# Patient Record
Sex: FEMALE | Race: WHITE | NOT HISPANIC OR LATINO | Employment: FULL TIME | URBAN - NONMETROPOLITAN AREA
[De-identification: names, ages, dates, MRNs, and addresses within clinical notes are randomized per-mention and may not be internally consistent; named-entity substitution may affect disease eponyms.]

---

## 2017-01-03 ENCOUNTER — AMBULATORY - GICH (OUTPATIENT)
Dept: SCHEDULING | Facility: OTHER | Age: 22
End: 2017-01-03

## 2017-01-04 ENCOUNTER — HISTORY (OUTPATIENT)
Dept: OBGYN | Facility: OTHER | Age: 22
End: 2017-01-04

## 2017-01-04 ENCOUNTER — HOSPITAL ENCOUNTER (OUTPATIENT)
Dept: OBGYN | Facility: OTHER | Age: 22
Discharge: HOME | End: 2017-01-04
Attending: FAMILY MEDICINE | Admitting: FAMILY MEDICINE

## 2017-01-04 LAB
BACTERIA URINE: NORMAL BACTERIA/HPF
BILIRUB UR QL: NEGATIVE
CLARITY, URINE: CLEAR CLARITY
COLOR UR: YELLOW COLOR
EPITHELIAL CELLS: NORMAL EPI/HPF
GLUCOSE URINE: NEGATIVE MG/DL
KETONES UR QL: NEGATIVE MG/DL
LEUKOCYTE ESTERASE URINE: ABNORMAL
NITRITE UR QL STRIP: NEGATIVE
OCCULT BLOOD,URINE - HISTORICAL: ABNORMAL
PH UR: 6.5 [PH]
PROTEIN QUALITATIVE,URINE - HISTORICAL: NEGATIVE MG/DL
RBC - HISTORICAL: NORMAL /HPF
SP GR UR STRIP: <=1.005
UROBILINOGEN,QUALITATIVE - HISTORICAL: NORMAL EU/DL
WBC - HISTORICAL: NORMAL /HPF

## 2017-01-05 ENCOUNTER — AMBULATORY - GICH (OUTPATIENT)
Dept: RADIOLOGY | Facility: OTHER | Age: 22
End: 2017-01-05

## 2017-01-05 ENCOUNTER — COMMUNICATION - GICH (OUTPATIENT)
Dept: GERIATRICS | Facility: OTHER | Age: 22
End: 2017-01-05

## 2017-01-05 DIAGNOSIS — N13.30 HYDRONEPHROSIS: ICD-10-CM

## 2017-01-06 ENCOUNTER — HOSPITAL ENCOUNTER (OUTPATIENT)
Dept: RADIOLOGY | Facility: OTHER | Age: 22
End: 2017-01-06
Attending: FAMILY MEDICINE

## 2017-01-06 DIAGNOSIS — N13.30 HYDRONEPHROSIS: ICD-10-CM

## 2017-01-18 ENCOUNTER — PRENATAL OFFICE VISIT - GICH (OUTPATIENT)
Dept: FAMILY MEDICINE | Facility: OTHER | Age: 22
End: 2017-01-18

## 2017-01-18 ENCOUNTER — HISTORY (OUTPATIENT)
Dept: FAMILY MEDICINE | Facility: OTHER | Age: 22
End: 2017-01-18

## 2017-01-18 DIAGNOSIS — Z23 ENCOUNTER FOR IMMUNIZATION: ICD-10-CM

## 2017-01-18 DIAGNOSIS — Z34.03 ENCOUNTER FOR SUPERVISION OF NORMAL FIRST PREGNANCY IN THIRD TRIMESTER: ICD-10-CM

## 2017-01-18 DIAGNOSIS — O09.899 SUPERVISION OF OTHER HIGH RISK PREGNANCIES, UNSPECIFIED TRIMESTER: ICD-10-CM

## 2017-01-18 LAB — RH IMMUNE GLOBULIN PREP - HISTORICAL: NORMAL

## 2017-02-02 ENCOUNTER — PRENATAL OFFICE VISIT - GICH (OUTPATIENT)
Dept: FAMILY MEDICINE | Facility: OTHER | Age: 22
End: 2017-02-02

## 2017-02-02 DIAGNOSIS — Z34.03 ENCOUNTER FOR SUPERVISION OF NORMAL FIRST PREGNANCY IN THIRD TRIMESTER: ICD-10-CM

## 2017-02-02 DIAGNOSIS — F41.1 GENERALIZED ANXIETY DISORDER: ICD-10-CM

## 2017-02-02 DIAGNOSIS — O09.892 SUPERVISION OF OTHER HIGH RISK PREGNANCIES, SECOND TRIMESTER: ICD-10-CM

## 2017-02-16 ENCOUNTER — HISTORY (OUTPATIENT)
Dept: FAMILY MEDICINE | Facility: OTHER | Age: 22
End: 2017-02-16

## 2017-02-16 ENCOUNTER — PRENATAL OFFICE VISIT - GICH (OUTPATIENT)
Dept: FAMILY MEDICINE | Facility: OTHER | Age: 22
End: 2017-02-16

## 2017-02-16 DIAGNOSIS — Z34.03 ENCOUNTER FOR SUPERVISION OF NORMAL FIRST PREGNANCY IN THIRD TRIMESTER: ICD-10-CM

## 2017-03-01 ENCOUNTER — HISTORY (OUTPATIENT)
Dept: FAMILY MEDICINE | Facility: OTHER | Age: 22
End: 2017-03-01

## 2017-03-01 ENCOUNTER — PRENATAL OFFICE VISIT - GICH (OUTPATIENT)
Dept: FAMILY MEDICINE | Facility: OTHER | Age: 22
End: 2017-03-01

## 2017-03-01 DIAGNOSIS — O09.893 SUPERVISION OF OTHER HIGH RISK PREGNANCIES, THIRD TRIMESTER: ICD-10-CM

## 2017-03-01 DIAGNOSIS — Z34.03 ENCOUNTER FOR SUPERVISION OF NORMAL FIRST PREGNANCY IN THIRD TRIMESTER: ICD-10-CM

## 2017-03-01 DIAGNOSIS — O12.03 GESTATIONAL EDEMA IN THIRD TRIMESTER: ICD-10-CM

## 2017-03-14 ENCOUNTER — HISTORY (OUTPATIENT)
Dept: FAMILY MEDICINE | Facility: OTHER | Age: 22
End: 2017-03-14

## 2017-03-14 ENCOUNTER — PRENATAL OFFICE VISIT - GICH (OUTPATIENT)
Dept: FAMILY MEDICINE | Facility: OTHER | Age: 22
End: 2017-03-14

## 2017-03-14 DIAGNOSIS — Z34.03 ENCOUNTER FOR SUPERVISION OF NORMAL FIRST PREGNANCY IN THIRD TRIMESTER: ICD-10-CM

## 2017-03-16 LAB — CULTURE - HISTORICAL: NORMAL

## 2017-03-22 ENCOUNTER — HISTORY (OUTPATIENT)
Dept: FAMILY MEDICINE | Facility: OTHER | Age: 22
End: 2017-03-22

## 2017-03-22 ENCOUNTER — PRENATAL OFFICE VISIT - GICH (OUTPATIENT)
Dept: FAMILY MEDICINE | Facility: OTHER | Age: 22
End: 2017-03-22

## 2017-03-22 DIAGNOSIS — Z34.03 ENCOUNTER FOR SUPERVISION OF NORMAL FIRST PREGNANCY IN THIRD TRIMESTER: ICD-10-CM

## 2017-03-24 ENCOUNTER — PRENATAL OFFICE VISIT - GICH (OUTPATIENT)
Dept: FAMILY MEDICINE | Facility: OTHER | Age: 22
End: 2017-03-24

## 2017-03-24 ENCOUNTER — COMMUNICATION - GICH (OUTPATIENT)
Dept: FAMILY MEDICINE | Facility: OTHER | Age: 22
End: 2017-03-24

## 2017-03-24 ENCOUNTER — HISTORY (OUTPATIENT)
Dept: FAMILY MEDICINE | Facility: OTHER | Age: 22
End: 2017-03-24

## 2017-03-24 DIAGNOSIS — Z3A.37 37 WEEKS GESTATION OF PREGNANCY: ICD-10-CM

## 2017-03-24 DIAGNOSIS — R10.30 LOWER ABDOMINAL PAIN: ICD-10-CM

## 2017-03-24 LAB
BACTERIA URINE: ABNORMAL BACTERIA/HPF
BILIRUB UR QL: NEGATIVE
CLARITY, URINE: ABNORMAL CLARITY
COLOR UR: YELLOW COLOR
EPITHELIAL CELLS: ABNORMAL EPI/HPF
GLUCOSE URINE: NEGATIVE MG/DL
HYALINE CASTS: ABNORMAL /LPF
KETONES UR QL: NEGATIVE MG/DL
LEUKOCYTE ESTERASE URINE: ABNORMAL
NITRITE UR QL STRIP: NEGATIVE
OCCULT BLOOD,URINE - HISTORICAL: ABNORMAL
OTHER: ABNORMAL
PH UR: 6 [PH]
PROTEIN QUALITATIVE,URINE - HISTORICAL: NEGATIVE MG/DL
RBC - HISTORICAL: ABNORMAL /HPF
SP GR UR STRIP: >=1.03
UROBILINOGEN,QUALITATIVE - HISTORICAL: NORMAL EU/DL
WBC - HISTORICAL: ABNORMAL /HPF

## 2017-03-28 ENCOUNTER — PRENATAL OFFICE VISIT - GICH (OUTPATIENT)
Dept: FAMILY MEDICINE | Facility: OTHER | Age: 22
End: 2017-03-28

## 2017-03-28 ENCOUNTER — HISTORY (OUTPATIENT)
Dept: FAMILY MEDICINE | Facility: OTHER | Age: 22
End: 2017-03-28

## 2017-03-28 DIAGNOSIS — Z34.03 ENCOUNTER FOR SUPERVISION OF NORMAL FIRST PREGNANCY IN THIRD TRIMESTER: ICD-10-CM

## 2017-03-30 ENCOUNTER — COMMUNICATION - GICH (OUTPATIENT)
Dept: FAMILY MEDICINE | Facility: OTHER | Age: 22
End: 2017-03-30

## 2017-04-04 ENCOUNTER — HISTORY (OUTPATIENT)
Dept: FAMILY MEDICINE | Facility: OTHER | Age: 22
End: 2017-04-04

## 2017-04-04 ENCOUNTER — PRENATAL OFFICE VISIT - GICH (OUTPATIENT)
Dept: FAMILY MEDICINE | Facility: OTHER | Age: 22
End: 2017-04-04

## 2017-04-04 DIAGNOSIS — Z34.03 ENCOUNTER FOR SUPERVISION OF NORMAL FIRST PREGNANCY IN THIRD TRIMESTER: ICD-10-CM

## 2017-04-07 ENCOUNTER — HISTORY (OUTPATIENT)
Dept: FAMILY MEDICINE | Facility: OTHER | Age: 22
End: 2017-04-07

## 2017-04-07 ENCOUNTER — PRENATAL OFFICE VISIT - GICH (OUTPATIENT)
Dept: FAMILY MEDICINE | Facility: OTHER | Age: 22
End: 2017-04-07

## 2017-04-07 DIAGNOSIS — Z34.03 ENCOUNTER FOR SUPERVISION OF NORMAL FIRST PREGNANCY IN THIRD TRIMESTER: ICD-10-CM

## 2017-04-10 ENCOUNTER — HISTORY (OUTPATIENT)
Dept: OBGYN | Facility: OTHER | Age: 22
End: 2017-04-10

## 2017-04-13 ENCOUNTER — AMBULATORY - GICH (OUTPATIENT)
Dept: FAMILY MEDICINE | Facility: OTHER | Age: 22
End: 2017-04-13

## 2017-04-13 DIAGNOSIS — N61.0 MASTITIS WITHOUT ABSCESS: ICD-10-CM

## 2017-04-14 ENCOUNTER — HISTORY (OUTPATIENT)
Dept: OBGYN | Facility: OTHER | Age: 22
End: 2017-04-14

## 2017-04-14 ENCOUNTER — AMBULATORY - GICH (OUTPATIENT)
Dept: OBGYN | Facility: OTHER | Age: 22
End: 2017-04-14

## 2017-04-29 ENCOUNTER — HISTORY (OUTPATIENT)
Dept: EMERGENCY MEDICINE | Facility: OTHER | Age: 22
End: 2017-04-29

## 2017-05-04 ENCOUNTER — HISTORY (OUTPATIENT)
Dept: FAMILY MEDICINE | Facility: OTHER | Age: 22
End: 2017-05-04

## 2017-05-04 ENCOUNTER — HOSPITAL ENCOUNTER (OUTPATIENT)
Dept: RADIOLOGY | Facility: OTHER | Age: 22
End: 2017-05-04
Attending: FAMILY MEDICINE

## 2017-05-04 ENCOUNTER — OFFICE VISIT - GICH (OUTPATIENT)
Dept: FAMILY MEDICINE | Facility: OTHER | Age: 22
End: 2017-05-04

## 2017-05-04 DIAGNOSIS — N89.8 OTHER SPECIFIED NONINFLAMMATORY DISORDERS OF VAGINA (CODE): ICD-10-CM

## 2017-05-04 DIAGNOSIS — R10.2 PELVIC AND PERINEAL PAIN: ICD-10-CM

## 2017-05-08 ENCOUNTER — COMMUNICATION - GICH (OUTPATIENT)
Dept: FAMILY MEDICINE | Facility: OTHER | Age: 22
End: 2017-05-08

## 2017-05-09 ENCOUNTER — OFFICE VISIT - GICH (OUTPATIENT)
Dept: FAMILY MEDICINE | Facility: OTHER | Age: 22
End: 2017-05-09

## 2017-05-09 ENCOUNTER — HISTORY (OUTPATIENT)
Dept: FAMILY MEDICINE | Facility: OTHER | Age: 22
End: 2017-05-09

## 2017-05-09 DIAGNOSIS — B37.31 CANDIDIASIS OF VULVA AND VAGINA: ICD-10-CM

## 2017-05-16 ENCOUNTER — HOSPITAL ENCOUNTER (OUTPATIENT)
Dept: RADIOLOGY | Facility: OTHER | Age: 22
End: 2017-05-16
Attending: OBSTETRICS & GYNECOLOGY

## 2017-05-16 ENCOUNTER — OFFICE VISIT - GICH (OUTPATIENT)
Dept: OBGYN | Facility: OTHER | Age: 22
End: 2017-05-16

## 2017-05-16 ENCOUNTER — AMBULATORY - GICH (OUTPATIENT)
Dept: OBGYN | Facility: OTHER | Age: 22
End: 2017-05-16

## 2017-05-16 ENCOUNTER — HISTORY (OUTPATIENT)
Dept: MEDSURG UNIT | Facility: OTHER | Age: 22
End: 2017-05-16

## 2017-05-16 DIAGNOSIS — R50.9 FEVER: ICD-10-CM

## 2017-05-16 DIAGNOSIS — N89.8 OTHER SPECIFIED NONINFLAMMATORY DISORDERS OF VAGINA (CODE): ICD-10-CM

## 2017-05-16 DIAGNOSIS — N71.9: ICD-10-CM

## 2017-05-16 LAB
ABSOLUTE BASOPHILS - HISTORICAL: 0.1 THOU/CU MM
ABSOLUTE EOSINOPHILS - HISTORICAL: 0.9 THOU/CU MM
ABSOLUTE LYMPHOCYTES - HISTORICAL: 1 THOU/CU MM (ref 0.9–2.9)
ABSOLUTE MONOCYTES - HISTORICAL: 0.7 THOU/CU MM
ABSOLUTE NEUTROPHILS - HISTORICAL: 16.2 THOU/CU MM (ref 1.7–7)
BACTERIA URINE: NORMAL BACTERIA/HPF
BASOPHILS # BLD AUTO: 0.5 %
BILIRUB UR QL: NEGATIVE
CLARITY, URINE: CLEAR CLARITY
COLOR UR: YELLOW COLOR
EOSINOPHIL NFR BLD AUTO: 4.7 %
EPITHELIAL CELLS: NORMAL EPI/HPF
ERYTHROCYTE [DISTWIDTH] IN BLOOD BY AUTOMATED COUNT: 12.5 % (ref 11.5–15.5)
GLUCOSE URINE: NEGATIVE MG/DL
HCT VFR BLD AUTO: 43.2 % (ref 33–51)
HEMOGLOBIN: 14.9 G/DL (ref 12–16)
HYALINE CASTS: NORMAL /LPF
KETONES UR QL: ABNORMAL MG/DL
LEUKOCYTE ESTERASE URINE: NEGATIVE
LYMPHOCYTES NFR BLD AUTO: 5 % (ref 20–44)
MCH RBC QN AUTO: 30.9 PG (ref 26–34)
MCHC RBC AUTO-ENTMCNC: 34.5 G/DL (ref 32–36)
MCV RBC AUTO: 90 FL (ref 80–100)
MONOCYTES NFR BLD AUTO: 3.8 %
NEUTROPHILS NFR BLD AUTO: 85.5 % (ref 42–72)
NITRITE UR QL STRIP: NEGATIVE
OCCULT BLOOD,URINE - HISTORICAL: NEGATIVE
PH UR: 6 [PH]
PLATELET # BLD AUTO: 315 THOU/CU MM (ref 140–440)
PMV BLD: 9.9 FL (ref 6.5–11)
PROTEIN QUALITATIVE,URINE - HISTORICAL: 30 MG/DL
RBC - HISTORICAL: NORMAL /HPF
RED BLOOD COUNT - HISTORICAL: 4.82 MIL/CU MM (ref 4–5.2)
SP GR UR STRIP: 1.02
UROBILINOGEN,QUALITATIVE - HISTORICAL: NORMAL EU/DL
WBC - HISTORICAL: NORMAL /HPF
WHITE BLOOD COUNT - HISTORICAL: 19 THOU/CU MM (ref 4.5–11)

## 2017-05-17 ENCOUNTER — HISTORY (OUTPATIENT)
Dept: SURGERY | Facility: OTHER | Age: 22
End: 2017-05-17

## 2017-05-17 ENCOUNTER — SURGERY (OUTPATIENT)
Dept: SURGERY | Facility: OTHER | Age: 22
End: 2017-05-17

## 2017-05-18 LAB — CULTURE - HISTORICAL: NORMAL

## 2017-05-25 ENCOUNTER — OFFICE VISIT - GICH (OUTPATIENT)
Dept: OBGYN | Facility: OTHER | Age: 22
End: 2017-05-25

## 2017-05-25 DIAGNOSIS — Z30.9 ENCOUNTER FOR CONTRACEPTIVE MANAGEMENT: ICD-10-CM

## 2017-05-25 DIAGNOSIS — Z09 ENCOUNTER FOR FOLLOW-UP EXAMINATION AFTER COMPLETED TREATMENT FOR CONDITIONS OTHER THAN MALIGNANT NEOPLASM: ICD-10-CM

## 2017-08-17 ENCOUNTER — HISTORY (OUTPATIENT)
Dept: FAMILY MEDICINE | Facility: OTHER | Age: 22
End: 2017-08-17

## 2017-08-17 ENCOUNTER — OFFICE VISIT - GICH (OUTPATIENT)
Dept: FAMILY MEDICINE | Facility: OTHER | Age: 22
End: 2017-08-17
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
## 2017-12-28 NOTE — PROGRESS NOTES
Patient Information     Patient Name MRN Sex Nicky Ritchie [id number] Female 1995      Progress Notes by Charly Barraza MD at 2017  2:15 PM     Author:  Charly Barraza MD Service:  (none) Author Type:  Physician     Filed:  2017  3:59 PM Encounter Date:  2017 Status:  Signed     :  Charly Barraza MD (Physician)            SUBJECTIVE:  22 y.o. female who presents for evaluation of an insect bite. She got is on the inner aspect of her left ankle. She states she is very sensitive to horse fly bites. She thinks it was over slighted better, although she didn't see it. It's been itchy and swollen, but now is gotten more swollen and more painful. No fever or chills. The bite was just over 48 hours ago.    Additional Review of Systems: see HPI:   No rash elsewhere and no other associated symptoms, specifically no cardiopulmonary symptoms. Of note is that she is breast-feeding a 4-month-old.    Past Medical History:     Diagnosis  Date     Chronic cough 02     Distal radius fracture 2006    Left arm      Menarche      Pregnancy         Current Outpatient Prescriptions       Medication  Sig Dispense Refill     cephalexin (KEFLEX) 500 mg capsule Take 1 capsule by mouth 2 times daily for 7 days. 14 capsule 0     EPINEPHrine (EPIPEN) 0.3 mg/0.3 mL (1:1,000) injection Inject 0.3 mg intramuscular one time if needed for Allergic Reaction for up to 1 dose. 1 Each 2     norethindrone, Contraceptive, (MICRONOR, 28,) 0.35 mg tablet Take 1 tablet by mouth once daily. 3 Package 3     prenatal vitamin-folic acid 1 mg (PRENATAL VITAMIN) tablet/capsule Take 1 tablet by mouth once daily. 90 tablet 4     No current facility-administered medications for this visit.      Medications have been reviewed by me and are current to the best of my knowledge and ability.      Allergies as of 2017 - Marco as Reviewed 2017      Allergen  Reaction Noted     Other [unlisted  "allergen (include detail in comments)] Anaphylaxis 11/10/2015     Dust [house dust] Itching 11/10/2015     Grass pollen-bermuda, standard Itching 11/10/2015     Mold extracts Itching 04/17/2016     Pollen extracts Itching 11/10/2015     Tree nut Itching 11/10/2015        OBJECTIVE:  /68  Pulse 84  Temp 98.3  F (36.8  C)  Ht 1.651 m (5' 5\")  Wt 69.5 kg (153 lb 3.2 oz)  Breastfeeding? Yes  BMI 25.49 kg/m2  EXAM:  General Appearance: Pleasant, alert, appropriate appearance for age. No acute distress  Skin: Left ankle area was examined. There is an obvious bite with surrounding erythema of about 2 cm which is slightly tender. The entire medial aspect of the foot is swollen. There are some excoriations. There are no enlarged lymph nodes and the erythema has not advancing superiorly.      ASSESSMENT/PLAN:  Insect bite-likely mildly infected. Recommended she use ice to the area and Benadryl to decrease the swelling and itching. Placed her on Keflex for a week. She will follow up again if symptoms don't improve.  Charly Barraza MD ....................  8/17/2017   3:59 PM            "

## 2017-12-29 ENCOUNTER — OFFICE VISIT - GICH (OUTPATIENT)
Dept: FAMILY MEDICINE | Facility: OTHER | Age: 22
End: 2017-12-29

## 2017-12-29 ENCOUNTER — HISTORY (OUTPATIENT)
Dept: FAMILY MEDICINE | Facility: OTHER | Age: 22
End: 2017-12-29

## 2017-12-29 DIAGNOSIS — R06.2 WHEEZING: ICD-10-CM

## 2017-12-30 NOTE — NURSING NOTE
Patient Information     Patient Name MRN Nicky Dunaway [id number] Female 1995      Nursing Note by Katerina Olvera at 2017  2:15 PM     Author:  Katerina Olvera Service:  (none) Author Type:  (none)     Filed:  2017  3:03 PM Encounter Date:  2017 Status:  Signed     :  Katerina Olvera            Patient presents to the clinic today for a bug bite on her left ankle, she first noticed it 8/15.    Katerina Olvera ....................  2017   2:22 PM

## 2018-01-02 NOTE — TELEPHONE ENCOUNTER
Patient Information     Patient Name MRN Nicky Dunaway [id number] Female 1995      Telephone Encounter by Sondra Bello at 2017  2:43 PM     Author:  Sondra Bello Service:  (none) Author Type:  (none)     Filed:  2017  2:43 PM Encounter Date:  2017 Status:  Signed     :  Sondra Bello            Agustin Saunders of DO ronan Gonzalez.  Sondra Bello LPN............................... 2017 2:43 PM

## 2018-01-02 NOTE — TELEPHONE ENCOUNTER
Patient Information     Patient Name MRN Nicky Dunaway [id number] Female 1995      Telephone Encounter by Sondra Bello at 2017  1:40 PM     Author:  Sondra Bello Service:  (none) Author Type:  (none)     Filed:  2017  1:41 PM Encounter Date:  2017 Status:  Signed     :  Sondra Bello            Left message to return call.  Sondra Bello LPN............................... 2017 1:41 PM

## 2018-01-02 NOTE — PROGRESS NOTES
Patient Information     Patient Name MRN Sex Nicky Ritchie [id number] Female 1995      Progress Notes by Vikki Sandoval RN at 2017  8:22 PM     Author:  Vikki Sandoval RN Service:  (none) Author Type:  NURS- Registered Nurse     Filed:  2017  8:24 PM Date of Service:  2017  8:22 PM Status:  Signed     :  Vikki Sandoval RN (NURS- Registered Nurse)            Patient here with lower left flank pain rated 6/10. VSS. Patient not having contractions. Baby's HR 140s. Call placed to [facility]. Possible mild hydronephrosis. Order for tylenol and oral hyrdration, will reevaluate in an hour. Vikki Sandoval RN ....................  2017   8:23 PM

## 2018-01-02 NOTE — PROGRESS NOTES
Patient Information     Patient Name MRN Sex Nicky Ritchie [id number] Female 1995      Progress Notes by Vikki Sandoval RN at 2017  9:24 PM     Author:  Vikki Sandoval RN Service:  (none) Author Type:  NURS- Registered Nurse     Filed:  2017  9:24 PM Date of Service:  2017  9:24 PM Status:  Signed     :  Vikki Sandoval RN (NURS- Registered Nurse)            Pain has resolved with oral hydration and tylenol. Patient being discharged to home. Renal ultrasound has been set up for patient tomorrow. Vikki Sandoval RN ....................  2017   9:24 PM

## 2018-01-02 NOTE — TELEPHONE ENCOUNTER
Patient Information     Patient Name MRN Sex Nicky Ritchie [id number] Female 1995      Telephone Encounter by Sondra Bello at 2017  1:43 PM     Author:  Sondra Bello Service:  (none) Author Type:  (none)     Filed:  2017  1:49 PM Encounter Date:  2017 Status:  Signed     :  Sondra Bello            Nicky is calling because she is suppose to come in for an US on her kidneys. Last night in [facility] they thought she may have hydronephrosis so she was told to take Tylenol for the pain and drink lots of water. She states today that she doesn't really have much pain so she is wondering if she still needs to have the ultrasound? Please address.  Sondra Bello LPN............................... 2017 1:48 PM

## 2018-01-02 NOTE — TELEPHONE ENCOUNTER
Patient Information     Patient Name MRN Sex Nicky Ritchie [id number] Female 1995      Telephone Encounter by Arpita Carrington DO at 2017  2:38 PM     Author:  Arpita Carrington DO Service:  (none) Author Type:  PHYS- Osteopathic     Filed:  2017  2:39 PM Encounter Date:  2017 Status:  Signed     :  Arpita Carrington DO (PHYS- Osteopathic)            It would be a good idea to follow through on the Ultrasound; because symptoms can come and go with hydronephrosis.    ARPITA CARRINGTON DO

## 2018-01-03 NOTE — PROGRESS NOTES
Patient Information     Patient Name MRN Sex Nicky Ritchie [id number] Female 1995      Progress Notes by Arpita Carrington DO at 3/22/2017 10:45 AM     Author:  Arpiat Carrington DO Service:  (none) Author Type:  PHYS- Osteopathic     Filed:  3/22/2017 11:34 AM Encounter Date:  3/22/2017 Status:  Signed     :  Arpita Carrington DO (PHYS- Osteopathic)            No vb, LOF  No HA  Vertex: yes  GBS: negative  Weight: 47# TWG  Substance use: no  Labor/delivery plan: likely epidural; hoping .  Questions answered  RTC 1 week or prn  ARPITA CARRINGTON DO

## 2018-01-03 NOTE — PROGRESS NOTES
Patient Information     Patient Name MRN Nicky Dunaway [id number] Female 1995      Progress Notes by Arpita Quezada DO at 2017 11:00 AM     Author:  Arpita Quezada DO Service:  (none) Author Type:  PHYS- Osteopathic     Filed:  2017  6:08 PM Encounter Date:  2017 Status:  Signed     :  Arpita Quezada DO (PHYS- Osteopathic)            No vb, LOF, cramping/contractions, pelvic/vaginal pain or pressure  No HA  Anxiety is increasing.  Has a history of anxiety.  Feeling difficult to cope through the day at times.  Gets overwhelmed and teary often.  Nervous about how she will be after baby.  Discussed SSRI use during pregnancy/breast feeding.  Encouraged coping mechanisms and monitoring.  However if desires to start, will send Zoloft 25mg daily into pharmacy today.  Will monitor at upcoming next visits.  Weight: 31# TWG  PNV: yes  Tobacco: no  Substance use: no  Rhogam: given last visit, 2017  I hr GTT: pass  Counseled on signs/sx PTL  Reviewed PP contraception (will continue to think about it); vacuum.  Desiring epidural during labor.  Questions answered  RTC 2 weeks or prn  ARPITA QUEZADA DO

## 2018-01-03 NOTE — PROGRESS NOTES
Patient Information     Patient Name MRN Nicky Dunaway [id number] Female 1995      Progress Notes by Arpita Quezada DO at 2017 11:30 AM     Author:  Arpita Quezada DO Service:  (none) Author Type:  PHYS- Osteopathic     Filed:  2017  6:43 AM Encounter Date:  2017 Status:  Signed     :  Arpita Quezada DO (PHYS- Osteopathic)            No vb, LOF, cramping/contractions  No HA  She has not yet started her Zoloft.  She is worried things are going to be worse either at the end of pregnancy or after delivery.  Is trying relaxation methods to at least get her through.  Did fill the Rx, and she may start it if she feels overwhelmed again.  Would like something after delivery either way.  Denies any SI/HI.  Has goals for her and her baby.  Vertex: yes  GBS: not yet collected  Weight: 35# TWG  PNV: yes  Tobacco: no  Substance use: no  Counseled on signs/sx PTL  Questions answered  RTC 2 weeks or prn  ARPITA QUEZADA DO

## 2018-01-03 NOTE — PATIENT INSTRUCTIONS
Patient Information     Patient Name MRN Nicky Dunaway [id number] Female 1995      Patient Instructions by Arpita Carrington DO at 2017 11:00 AM     Author:  Arpita Carrington DO Service:  (none) Author Type:  PHYS- Osteopathic     Filed:  2017 11:21 AM Encounter Date:  2017 Status:  Signed     :  Arpita Carrington DO (PHYS- Osteopathic)               Index Armenian   Sertraline (Zoloft), Oral   SER-tra-cynthia    KEY POINTS    This medicine is taken by mouth to treat depression, anxiety disorders, OCD, and other mental health problems. Take it exactly as directed.    This medicine may increase suicidal thoughts or actions in some people.    Keep all appointments for tests to see how this medicine affects you.    This medicine may cause unwanted side effects. Tell your healthcare provider if you have any side effects that are serious, continue, or get worse.    This medicine may cause life-threatening problems if you take this medicine with certain other medicines. Tell all healthcare providers who treat you about all the prescription medicines, nonprescription medicines, supplements, natural remedies, and vitamins that you take.  ________________________________________________________________________  What are other names for this medicine?   Type of medicine: selective serotonin reuptake inhibitor (SSRI); antidepressant  Generic and brand names: sertraline, oral; Zoloft; Zoloft Oral Concentrate  What is this medicine used for?  This medicine is taken by mouth to treat:    Depression    Obsessive-compulsive disorder (OCD)    Panic disorder    Posttraumatic stress disorder    Premenstrual dysphoric disorder (PMDD)    Social anxiety disorder (social phobia)  This medicine may be used to treat other conditions as determined by your healthcare provider.  What should my healthcare provider know before I take this medicine?   Before taking this medicine, tell your healthcare provider  if you have ever had:    An allergic reaction to any medicine or to latex    A stroke or transient ischemic attack (TIA)    Bipolar disorder    Bleeding problems    Glaucoma    Heart disease or a heart attack    High blood pressure    Liver or kidney disease    Low levels of sodium in the blood    Seizures    Thoughts of suicide  Do not take this medicine if you have taken an MAO inhibitor antidepressant in the last 2 weeks. You may have serious side effects. Talk with your healthcare provider about this.  Do not take pimozide (Orap) while taking this medicine.  Do not take disulfiram (Antabuse) while taking the liquid form of this medicine.   Females of childbearing age: Talk with your healthcare provider if you are pregnant or plan to become pregnant. It is not known whether this medicine will harm an unborn baby. Do not breast-feed while taking this medicine without your healthcare provider's approval.  How do I take it?   Read the Medication Guide that comes in the medicine package when you start taking this medicine and each time you get a refill.  Check the label on the medicine for directions about your specific dose. Take this medicine exactly as your healthcare provider prescribes. Do not take more of it or take it longer than prescribed. Taking too much can increase the risk of side effects. Do not stop taking this medicine without your healthcare provider's approval. You may need to reduce your dosage slowly to avoid withdrawal symptoms.  Check with your healthcare provider before using this medicine in children under age 6.  An adult should supervise the use of this medicine by a child.  This medicine comes in tablet and liquid concentrate form. You may take the tablets with or without food.   If you have the liquid concentrate, use the dropper to measure the exact dose your healthcare provider prescribes. Just before taking it, mix the dose with half a cup of water, ginger ale, lemon/lime soda,  lemonade, or orange juice ONLY. Do not mix it with any other liquid. Drink it right after you mix it. Do not take disulfiram (Antabuse) while taking the liquid form of this medicine.  Your healthcare provider will want to see you regularly to check your response to this medicine and to see if your dosage needs to be changed. It may take some time for you to feel better. Do not stop taking the medicine until your healthcare provider tells you to do so. You may have to take this medicine for 4 weeks or more to feel its full effects.  What if I miss a dose?  Do not miss a dose. If you miss a dose, take it as soon as you remember unless it is almost time for the next scheduled dose. In that case, skip the missed dose and take the next one as directed. Do not take double doses. If you are not sure of what to do if you miss a dose, or if you miss more than one dose, contact your healthcare provider.  What if I overdose?  If you or anyone else has intentionally taken too much of this medicine, call 911 or go to the emergency room right away. If you pass out, have seizures, weakness or confusion, or have trouble breathing, call 911. If you think that you or anyone else may have taken too much of this medicine, call the poison control center. Do this even if there are no signs of discomfort or poisoning. The poison control center number is [phone].  Symptoms of an acute overdose may include: dizziness, fainting, drowsiness, diarrhea, nausea, vomiting, fast or irregular heartbeat, restlessness, tremors, confusion, seizures, coma.  What should I watch out for?   Antidepressant medicines may increase suicidal thoughts or actions in some children, teenagers, and young adults within the first few months of treatment or at times of dose changes. Call your healthcare provider right away if you or your family notice:    New or increased thoughts of suicide    Changes in thoughts, mood, or behavior such as becoming irritable or  anxious    Worsening of depression    More outgoing or aggressive behavior than normal  This medicine may cause serotonin syndrome, which can be life-threatening. It may be caused by taking this medicine with other medicines. These medicines include other antidepressants, medicines to treat migraines, pain medicines, some cough medicines, Familia s wort, and others. Make sure that your providers know ALL of the medicines that you take.  Contact your healthcare provider right away if you have:    Restlessness    Hallucinations    Loss of coordination    Stiff muscles    Fast heart beat    Rapid changes in blood pressure or dizziness    Increased body temperature, sweating, or flushing    Nausea    Vomiting    Diarrhea  This medicine may trigger angle-closure glaucoma. Contact your provider right away if you have eye pain, vision changes, or redness and swelling in or around your eye.  This medicine may make you dizzy or drowsy. Do not drive or operate machinery unless you are fully alert.  This medicine may increase the effects of alcohol and interact with many other medicines. Do not drink alcohol or take any other medicine, including nonprescription products or natural remedies, unless your healthcare provider approves.  Occasionally, this medicine can cause some sexual problems. Ask your healthcare provider about this.  This medicine may cause changes in appetite or weight, or affect growth in children. Talk with your healthcare provider about this.  Adults over the age of 65 may be more sensitive to this medicine and may require a different dosage.  If you need emergency care, surgery, lab tests, or dental work, tell the healthcare provider or dentist you are taking this medicine.  What are the possible side effects?   Along with its needed effects, your medicine may cause some unwanted side effects. Some side effects may be very serious. Some side effects may go away as your body adjusts to the medicine. Tell  your healthcare provider if you have any side effects that continue or get worse.  Life-threatening (Report these to your healthcare provider right away. If you are unable to reach your healthcare provider right away, get emergency medical care or call 911 for help): Allergic reaction (hives; itching; rash; trouble breathing; chest pain or tightness in your chest; swelling of your lips, tongue, and throat).  Serious (report these to your healthcare provider right away): Seizures; thoughts of suicide or worsening of your depression; unusual changes in thoughts, mood, or behavior; severe nervousness; trouble breathing; fever; increased sweating; hallucinations; slurred speech; loss of coordination or unsteadiness; stiff muscles or uncontrolled muscle spasms; severe joint or muscle pain; confusion; severe dizziness or fainting; severe headache; chest pain; fast, slow, or irregular heartbeat; unusual bruising or bleeding; black or tarry bowel movements; increased or heavy menstrual bleeding; blistering, peeling, or severe skin rash; memory problems or trouble concentrating; severe nausea, vomiting, or diarrhea; eye pain, vision changes, or swelling and redness around your eyes.  Other: Mild headache, loss of appetite, weight loss or gain, drowsiness, stomach pain, weakness, nausea, vomiting, shaking, trouble sleeping, mild dizziness, dry mouth, cough, runny nose, constipation, sweating, diarrhea, restlessness, rash, itching, change in sex drive or ability, increased yawning; menstrual changes.  What products might interact with this medicine?   When you take this medicine with other medicines, it can change the way this or any of the other medicines work. Nonprescription medicines, vitamins, natural remedies, and certain foods may also interact. Using these products together might cause harmful side effects. Talk to your healthcare provider if you are taking:    Alcohol    Alosetron (Lotronex)    Anagrelide  (Agrylin)    Antianxiety medicines such as alprazolam (Xanax), clonazepam (Klonopin), clorazepate (Gen-Xene, Tranxene), diazepam (Valium), lorazepam (Ativan), and oxazepam    Antibiotics such as azithromycin (Zithromax, Zmax), bedaquiline (Sirturo), ciprofloxacin (Cipro), clarithromycin (Biaxin), erythromycin (E.E.S., Edil-Tab, Erythrocin), levofloxacin (Levaquin), linezolid (Zyvox), metronidazole, moxifloxacin (Avelox), ofloxacin, pentamidine (NebuPent, Pentam), and telithromycin (Ketek)    Antifungal medicines such as fluconazole (Diflucan), itraconazole (Sporanox), ketoconazole (Nizoral), posaconazole (Noxafil), terbinafine (Lamisil), and voriconazole (Vfend)    Antihistamines such as brompheniramine, chlorpheniramine (Chlor-Trimeton), clemastine (Tavist), diphenhydramine (Benadryl), and hydroxyzine (Vistaril)    Antipsychotic medicines such as aripiprazole (Abilify), asenapine (Saphris), chlorpromazine, clozapine (Clozaril, FazaClo), fluphenazine, haloperidol (Haldol), iloperidone (Fanapt), olanzapine (Zyprexa), paliperidone (Invega), perphenazine, pimozide (Orap), quetiapine (Seroquel), risperidone (Risperdal), thioridazine, trifluoperazine, and ziprasidone (Geodon)    Antiseizure medicines such as carbamazepine (Carbatrol, Epitol, Equetro, Tegretol), phenobarbital, phenytoin (Dilantin, Phenytek), primidone (Mysoline), and valproic acid (Depacon, Depakene, Depakote)    Apomorphine (Apokyn)    Aspirin and other salicylates    Atomoxetine (Strattera)    Beta blockers such as carvedilol (Coreg), metoprolol (Lopressor, Toprol), nebivolol (Bystolic), and pindolol    Bromocriptine (Cycloset, Parlodel)    Bupropion (Aplenzin, Forfivo, Wellbutrin, Buproban, Zyban)    Buspirone    Cabergoline    Cancer medicines such as abiraterone (Zytiga), arsenic trioxide (Trisenox), ceritinib (Zykadia), crizotinib (Xalkori), dasatinib (Sprycel), degarelix (Firmagon), lapatinib (Tykerb), nilotinib (Tasigna), pazopanib (Votrient),  sorafenib (Nexavar), sunitinib (Sutent), toremifene (Fareston), vandetanib (Caprelsa), and vemurafenib (Zelboraf)    Cinacalcet (Sensipar)    Dextromethorphan, an ingredient in many cough, cold, or allergy medicines such as Robitussin-DM    Dextromethorphan/quinidine (Nuedexta)    Diabetes medicines such as glipizide (Glucotrol), glyburide (DiaBeta, Glynase), insulin, metformin (Fortamet, Glucophage, Riomet), nateglinide (Starlix), pioglitazone (Actos), repaglinide (Prandin), rosiglitazone (Avandia), and tolbutamide    Disulfiram (Antabuse) may interact with the liquid concentrate of this medicine because it contains alcohol    Diuretics (water pills) such as amiloride, bumetanide, chlorothiazide (Diuril), furosemide (Lasix), hydrochlorothiazide (Microzide), spironolactone (Aldactone), torsemide (Demadex), and triamterene (Dyrenium)    Doxepin (Silenor)    Eliglustat (Cerdelga)    Fingolimod (Gilenya)    Heart medicines such as amiodarone (Cordarone, Pacerone), digoxin (Lanoxin), disopyramide (Norpace), dofetilide (Tikosyn), dronedarone (Multaq), flecainide, mexiletine, procainamide, propafenone (Rythmol), quinidine, and sotalol (Betapace, Sorine)    HIV medicines such as atazanavir (Reyataz), darunavir (Prezista), delavirdine (Rescriptor), efavirenz (Sustiva), elvitegravir/cobicistat/emtricitabine/tenofovir (Stribild), lopinavir/ritonavir (Kaletra), nelfinavir (Viracept), rilpivirine (Edurant), ritonavir (Norvir), saquinavir (Invirase), and tipranavir (Aptivus)    Lithium (Lithobid)    Malaria medicines such as artemether/lumefantrine (Coartem), chloroquine, mefloquine, primaquine, and quinine    MAO inhibitors such as isocarboxazid (Marplan), phenelzine (Nardil), selegiline (Eldepryl, Emsam, Zelapar), and tranylcypromine (Parnate) (Do not take this medicine and an MAO inhibitor within 14 days of each other.)    Medicines to block or prevent stomach acid such as cimetidine (Tagamet) and famotidine  (Pepcid)    Medicines to treat breathing or lung problems such as arformoterol (Brovana), formoterol (Foradil, Perforomist), and salmeterol (Serevent)    Medicines to treat or prevent blood clots such as apixaban (Eliquis), cilostazol (Pletal), clopidogrel (Plavix), dabigatran (Pradaxa), dipyridamole (Persantine), enoxaparin (Lovenox), fondaparinux (Arixtra), prasugrel (Effient), rivaroxaban (Xarelto), ticagrelor (Brilinta), and warfarin (Coumadin)    Metoclopramide (Metozolv, Reglan)    Mifepristone (Korlym, Mifeprex)    Migraine medicines such as almotriptan (Axert), dihydroergotamine (D.H.E. [address], Migranal), eletriptan (Relpax), ergotamine (Ergomar), frovatriptan (Frova), naratriptan (Amerge), rizatriptan (Maxalt), sumatriptan (Alsuma, Imitrex, Sumavel), and zolmitriptan (Zomig)    Milnacipran (Savella)    Muscle relaxants such as baclofen (Gablofen, Lioresal), carisoprodol (Soma), cyclobenzaprine (Amrix, Fexmid), dantrolene (Dantrium), methocarbamol (Robaxin), and tizanidine (Zanaflex)    Natural remedies such as alfalfa, anise, bilberry, cat s claw, garlic, cristal, ginkgo biloba, ginseng, glucosamine, gotu olvin, green tea, kava, SAMe, [place] Rue, Familia's wort, tryptophan, and valerian    Nausea medicines such as aprepitant (Emend), dolasetron (Anzemet), droperidol (Inapsine), ondansetron (Zofran), prochlorperazine (Compro), and promethazine    Nonsteroidal anti-inflammatory medicines (NSAIDs) such as celecoxib (Celebrex), diclofenac (Cambia, Voltaren, Zipsor), diflunisal, etodolac, ibuprofen (Advil, Motrin), indomethacin (Indocin), ketoprofen, ketorolac (Toradol), meloxicam (Mobic), nabumetone (Relafen), naproxen (Aleve, Anaprox, Naprelan), oxaprozin (Daypro), piroxicam (Feldene), and sulindac (Clinoril)    Octreotide (Sandostatin)    Other antidepressants such as amitriptyline, citalopram (Celexa), clomipramine, desipramine (Norpramin), desvenlafaxine (Pristiq), duloxetine (Cymbalta), escitalopram (Lexapro),  fluoxetine (Prozac), fluvoxamine (Luvox), imipramine (Tofranil), mirtazapine (Remeron), nefazodone, nortriptyline (Pamelor), trazodone, venlafaxine (Effexor), and vilazodone (Viibryd)    Pain medicines such as codeine, fentanyl (Abstral, Actiq, Duragesic, Fentora, Sublimaze), hydrocodone/acetaminophen (Norco, Vicodin), meperidine (Demerol), methadone (Dolophine, Methadose), morphine (Leanna, MS Contin), oxycodone (OxyContin, Roxicodone), tapentadol (Nucynta), and tramadol (ConZip, Ultram)    Paroxetine (Brisdelle, Paxil, Pexeva)    Pasireotide (Signifor)    Procarbazine (Matulane)    Products that contain methylene blue (Hyophen, Phosphasal, Prosed DS, Urelle, Uribel, Tawnya)    Propranolol (Hemangeol, Inderal, InnoPran)    Rasagiline (Azilect)    Sleeping pills such as butabarbital (Butisol), phenobarbital, ramelteon (Rozerem), triazolam (Halcion), zaleplon (Sonata), and zolpidem (Ambien, Edluar, Intermezzo)    Stimulants and diet pills such as dextroamphetamine (Dexedrine), methamphetamine (Desoxyn), and methylphenidate (Concerta, Daytrana, Metadate, Ritalin)    Tacrolimus (Astagraf, Prograf, Protopic)    Tetrabenazine (Xenazine)    Vardenafil (Levitra, Staxyn)  Do not drink alcohol while you are taking this medicine unless your healthcare provider approves.  Ask your healthcare provider or pharmacist if you need to avoid products that contain grapefruit, [place] oranges, and tangelos while you are taking this medicine. These fruits and juices can affect the way this medicine works and may increase your risk of serious side effects.  If you are not sure if your medicines might interact, ask your pharmacist or healthcare provider. Keep a list of all your medicines with you. List all the prescription medicines, nonprescription medicines, supplements, natural remedies, and vitamins that you take. Be sure that you tell all healthcare providers who treat you about all the products you are taking.   How should I store this  medicine?   Store this medicine at room temperature. Keep the container tightly closed. Protect from heat, high humidity, and bright light.    This advisory includes selected information only and may not include all side effects of this medicine or interactions with other medicines. Ask your healthcare provider or pharmacist for more information or if you have any questions.  Ask your pharmacist for the best way to dispose of outdated medicine or medicine you have not used. Do not throw medicine in the trash.  Keep all medicines out of the reach of children.   Do not share medicines with other people.   Developed by [employer].  Medication Advisor 2016.2 published by [employer].  Last modified: 2016-04-01  Last reviewed: 2015-09-24  This content is reviewed periodically and is subject to change as new health information becomes available. The information is intended to inform and educate and is not a replacement for medical evaluation, advice, diagnosis or treatment by a healthcare professional.  References   Medication Advisor 2016.2 Index    Copyright   2016 [employer], a division of [employer] All rights reserved.

## 2018-01-03 NOTE — PROGRESS NOTES
Patient Information     Patient Name MRN Sex Nicky Ritchie [id number] Female 1995      Progress Notes by Arpita Carrington DO at 2017 11:30 AM     Author:  Arpita Carrington DO Service:  (none) Author Type:  PHYS- Osteopathic     Filed:  2017  2:48 PM Encounter Date:  2017 Status:  Signed     :  Arpita Carrington DO (PHYS- Osteopathic)            No vb, LOF, cramping/contractions, pelvic/vaginal pain or pressure  No HA  Weight: 26# TWG  PNV: yes  Tobacco: no  Substance use: no  Rhogam: Given today  I hr GTT: pass  Counseled on signs/sx PTL; discussed labor, hep B.  Will plan to discuss pp contraception and vacuum at next visit.  Questions answered  RTC 2 weeks or prn  ARPITA CARRINGTON DO

## 2018-01-03 NOTE — PROGRESS NOTES
Patient Information     Patient Name MRN Nicky Dunaway [id number] Female 1995      Progress Notes by Arpita Quezada DO at 3/1/2017 10:30 AM     Author:  Arpita Quezada DO Service:  (none) Author Type:  PHYS- Osteopathic     Filed:  3/1/2017 12:12 PM Encounter Date:  3/1/2017 Status:  Signed     :  Arpita Quezada DO (PHYS- Osteopathic)            No vb, LOF, cramping/contractions.  Anxiety is well controlled.  No HA  Vertex: yes  GBS: next visit  Weight: 41# TWG  PNV: yes  Tobacco: no  Substance use: no  Counseled on signs/sx PTL  Questions answered  RTC 2 weeks or prn  ARPITA QUEZADA DO

## 2018-01-03 NOTE — NURSING NOTE
Patient Information     Patient Name MRN Nicky Dunaway [id number] Female 1995      Nursing Note by Sondra Bello at 2017 11:30 AM     Author:  Sondra Bello Service:  (none) Author Type:  (none)     Filed:  2017 11:44 AM Encounter Date:  2017 Status:  Signed     :  Sondra Bello            Patient states that yesterday she was dizzy and SOB and has been having a higher pulse rate.  Sondra Bello LPN............................... 2017 11:44 AM

## 2018-01-03 NOTE — PROGRESS NOTES
Patient Information     Patient Name MRN Sex Nicky Ritchie [id number] Female 1995      Progress Notes by Arpita Carrington DO at 3/14/2017 10:45 AM     Author:  Arpita Carrington DO Service:  (none) Author Type:  PHYS- Osteopathic     Filed:  3/14/2017 11:42 AM Encounter Date:  3/14/2017 Status:  Signed     :  Arpita Carrington DO (PHYS- Osteopathic)            No vb, LOF  No HA  Vertex: yes  GBS: collected today  Weight: 46# TWG  Substance use: no  Labor/delivery plan: yes, s/s to present to [facility] discussed.  Questions answered  RTC 1 week or prn  ARPITA CARRINGTON DO

## 2018-01-04 NOTE — TELEPHONE ENCOUNTER
Patient Information     Patient Name MRN iNcky Dunaway [id number] Female 1995      Telephone Encounter by Sondra Bello at 2017 10:55 AM     Author:  Sondra Bello Service:  (none) Author Type:  (none)     Filed:  2017 10:56 AM Encounter Date:  2017 Status:  Signed     :  Sondra Bello            Nicky will be seeing Arpita Carrington DO today at 12:45.  Sondra Bello LPN............................... 2017 10:55 AM

## 2018-01-04 NOTE — PROGRESS NOTES
Patient Information     Patient Name MRN Sex Nicky Ritchie [id number] Female 1995      Progress Notes by Arpita Carrington DO at 2017  8:55 AM     Author:  Arpita Carrington DO Service:  (none) Author Type:  PHYS- Osteopathic     Filed:  2017  8:59 AM Encounter Date:  2017 Status:  Signed     :  Arpita Carrington DO (PHYS- Osteopathic)            Mom present with infant in hospital.  Having some redness, swelling of L breast.  Concern of developing mastitis.  Encouraged ongoing feedings/pumping.   Will print a Rx for antibiotic if developing fevers/chills, malaise, etc.    Follow up otherwise prn.  ARPITA CARRINGTON DO

## 2018-01-04 NOTE — TELEPHONE ENCOUNTER
Patient Information     Patient Name MRN Sex Nicky Ritchie [id number] Female 1995      Telephone Encounter by Arpita Carrington DO at 3/30/2017 12:50 PM     Author:  Arpita Carrington DO Service:  (none) Author Type:  PHYS- Osteopathic     Filed:  3/30/2017 12:51 PM Encounter Date:  3/30/2017 Status:  Signed     :  Arpita Carrington DO (PHYS- Osteopathic)            Letter created; in outbox  ARPITA CARRINGTON DO

## 2018-01-04 NOTE — PROGRESS NOTES
Patient Information     Patient Name MRN Sex Nicky Ritchie [id number] Female 1995      Progress Notes by Arpita Carrington DO at 2017 12:45 PM     Author:  Arpita Carrington DO Service:  (none) Author Type:  PHYS- Osteopathic     Filed:  2017  6:26 PM Encounter Date:  2017 Status:  Signed     :  Arpita Carrington DO (PHYS- Osteopathic)            SUBJECTIVE:  Nicky Hayes is a 22 y.o. female who presents for ER follow up.    HPI  Nicky is here with her S/O and son for ER follow up.  She delivered [place] vaginally after an elective induction of labor on 4/10/2017.  She had a R lateral episiotomy repaired with 3-0 Vicryl and no other delivery complications.    Since that time, she was seen in the ER on 2017 for a greenish discharge, malaise and fever.  She was treated with Augmentin.  At that time a wet prep and urinalysis were unremarkable.  She has continued to have the discharge slightly, however none in the last 1-2 days.  She continues to have lower abdominal cramping. No further fevers and energy has improved again.        Allergies      Allergen   Reactions     Other [Unlisted Allergen (Include Detail In Comments)]  Anaphylaxis     Bugs      Dust [House Dust]  Itching     Grass Pollen-[place], Standard  Itching     Mold Extracts  Itching     Pollen Extracts  Itching     Tree Nut  Itching   ,   Current Outpatient Prescriptions on File Prior to Visit       Medication  Sig Dispense Refill     acetaminophen (TYLENOL) 325 mg tablet Take 2 tablets by mouth every 4 hours if needed (mild pain.). Max acetaminophen dose: 4000mg in 24 hrs. 100 tablet 0     albuterol HFA (PRO-AIR,VENTOLIN,PROVENTIL) 90 mcg/actuation inhaler Inhale 2 Puffs by mouth every 4 hours if needed. 1 Inhaler 0     amoxicillin-clavulanate 875-125 mg tablet (AUGMENTIN) Take 1 tablet by mouth 2 times daily with meals for 10 days. 20 tablet 0     Breast Pump - Purchase For home use. Gestation age at  delivery: 40 weeks. Reason for need: separation of infant. Length of need: 2 years. 1 unit 0     EPINEPHrine (EPIPEN) 0.3 mg/0.3 mL (1:1,000) injection Inject 0.3 mg intramuscular one time if needed for Allergic Reaction for up to 1 dose. 1 Each 2     HYDROcodone-acetaminophen, 5-325 mg, (NORCO) per tablet Take 1-2 tablets by mouth every 6 hours if needed  for Pain Max acetaminophen dose: 4000 mg in 24 hrs. 30 tablet 0     ibuprofen (ADVIL; MOTRIN) 200 mg tablet Take 3 tablets by mouth every 6 hours if needed for Other (Specify) (uterine cramping.). 100 tablet 0     MISCELLANEOUS MEDICAL SUPPLY (GRADUATED COMPRESSION STOCKINGS) For personal use. Length: calf Strength: 20-30 mmHg 1 Packet 0     prenatal vitamin-folic acid 1 mg (PRENATAL VITAMIN) tablet/capsule Take 1 tablet by mouth once daily. 90 tablet 4     sertraline (ZOLOFT) 25 mg tablet Take 1 tablet by mouth once daily. 30 tablet 5     SUMAtriptan (IMITREX) 25 mg tablet Take 1 tablet by mouth every 2 hours if needed for Migraine. Max dose: 200mg per 24 hrs. 6 tablet 0     No current facility-administered medications on file prior to visit.     and   Past Medical History:     Diagnosis  Date     Chronic cough 09/19/02     Distal radius fracture 09/2006    Left arm      Menarche 02/07     Pregnancy        REVIEW OF SYSTEMS:  Review of Systems   Constitutional: Negative for chills, diaphoresis, fever and malaise/fatigue.   Cardiovascular: Negative for chest pain and palpitations.   Gastrointestinal: Positive for diarrhea (secondary to the antibiotic). Negative for blood in stool, constipation, nausea and vomiting.   Genitourinary:        Had some increase in bleeding after ER visit and vaginal swabs obtained; otherwise normal - no clots.  Some burning of the perineum remains, with urination.   Skin: Negative for rash.       OBJECTIVE:  /62  Pulse 72  Wt 74 kg (163 lb 3.2 oz)  LMP 06/25/2016  BMI 27.16 kg/m2    EXAM:   Physical Exam   Constitutional: She  is well-developed, well-nourished, and in no distress.   HENT:   Head: Normocephalic and atraumatic.   Cardiovascular: Normal rate and normal heart sounds.    Pulmonary/Chest: Effort normal and breath sounds normal.   Abdominal: Soft. Bowel sounds are normal. She exhibits no distension and no mass. There is tenderness in the suprapubic area. There is no rebound and no guarding.   Psychiatric: Mood and affect normal.   Nursing note and vitals reviewed.    US: round echogenic focus ~1.1cm large.  [place]?  Radiology read: 1cm round echogenic focus, possible RPOC.  I personally reviewed the images with Dr. Hazel and patient/boyfriend in the room.    ASSESSMENT/PLAN:    ICD-10-CM    1. Vaginal discharge N89.8 US PELVIS COMPLETE TA AND TV      US PELVIS COMPLETE TA AND TV   2. Suprapubic pain R10.2 US PELVIS COMPLETE TA AND TV      US PELVIS COMPLETE TA AND TV   3. Postpartum exam Z39.2 US PELVIS COMPLETE TA AND TV      US PELVIS COMPLETE TA AND TV        Plan:   1. Vaginal discharge, new, improving since treatment with abx from ER. Continue to monitor at this time.    2. Suprapubic pain with discharge, LGT and postpartum status - completed an US today showing a possible piece of RPOC that was ~1cm in size.  She currently has no bleeding, therefore things will be monitored.  She was instructed to return urgently with any bleeding, fever, or worsening of pain/discharge.  We will complete a repeat US in ~4 weeks; and if still present, consider EndoSee procedure in the office with OBGYN to evaluate need for further intervention.    Follow up pending above; sooner prn.

## 2018-01-04 NOTE — TELEPHONE ENCOUNTER
Patient Information     Patient Name MRN Nicky Dunaway [id number] Female 1995      Telephone Encounter by Nany Petersen at 3/24/2017  8:42 AM     Author:  Nany Petersen Service:  (none) Author Type:  (none)     Filed:  3/24/2017  8:44 AM Encounter Date:  3/24/2017 Status:  Signed     :  Nany Petersen            SMS PATIENT IS WANTING TO GET IN TODAY WITH SMS IF POSSIBLE. SHE HAS BEEN HAVING MUCUS/BLOOD SPOTTING FOR LAST 24 HOURS WITH DULL CONSTANT CRAMPING. SHE CALLED [facility] AND WAS TOLD TO GET IN WITH HER PRIMARY OR ANOTHER OB TODAY. PLEASE CALL, THANKS.  Nany Petersen ....................  3/24/2017   8:43 AM

## 2018-01-04 NOTE — TELEPHONE ENCOUNTER
Patient Information     Patient Name MRN Nicky Dunaway [id number] Female 1995      Telephone Encounter by Sondra Bello at 3/24/2017  8:47 AM     Author:  Sondra Bello Service:  (none) Author Type:  (none)     Filed:  3/24/2017  8:47 AM Encounter Date:  3/24/2017 Status:  Signed     :  Sondra Bello            Patient is going to come at 9:45 this morning.   Sondra Bello LPN............................... 3/24/2017 8:47 AM

## 2018-01-04 NOTE — TELEPHONE ENCOUNTER
Patient Information     Patient Name MRN Nicky Dunaway [id number] Female 1995      Telephone Encounter by Sondra Bello at 3/30/2017  2:37 PM     Author:  Sondra Bello Service:  (none) Author Type:  (none)     Filed:  3/30/2017  2:38 PM Encounter Date:  3/30/2017 Status:  Signed     :  Sondra Bello            Informed Nicky letter was written, she will  at unit 4 window.  Sondra Bello LPN............................... 3/30/2017 2:37 PM

## 2018-01-04 NOTE — PROGRESS NOTES
Patient Information     Patient Name MRN Sex Nicky Ritchie [id number] Female 1995      Progress Notes by Arpita Carrington DO at 3/28/2017  9:30 AM     Author:  Arpita Carrington DO Service:  (none) Author Type:  PHYS- Osteopathic     Filed:  3/28/2017 10:46 AM Encounter Date:  3/28/2017 Status:  Signed     :  Arpita Carrington DO (PHYS- Osteopathic)            No vb, LOF  No HA  Vertex: yes  GBS: negative  Weight: 48# TWG  Substance use: no  Labor/delivery plan: yes, will likely get epidural.  Wants to avoid delivery with another provider if possible.  Discussed indications for induction: elective vs medical.  Questions answered  RTC 1 week or prn  ARPITA CARRINGTON DO

## 2018-01-04 NOTE — TELEPHONE ENCOUNTER
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 A few times she got really dizzy and thinks it was because she is helping lift people etc. Arpita Carrington, DO ordered compression socks and Nicky states that she couldn't get them on in the morning. Yesterday she was doing homework all day and her feet swelled so bad that it hurt to walk. This morning Nikcy said her back was throbbing before she even got out of bed. Nicky spoke with her work and they ok'd her to start her leave early but she needs a doctors note.  Please address.  Sondra Bello LPN............................... 3/30/2017 10:19 AM

## 2018-01-04 NOTE — PROGRESS NOTES
Patient Information     Patient Name MRN Sex Nicky Ritchie [id number] Female 1995      Progress Notes by Arpita Carrington DO at 2017  9:01 AM     Author:  Arpita Carrington DO Service:  (none) Author Type:  PHYS- Osteopathic     Filed:  2017  9:05 AM Encounter Date:  2017 Status:  Signed     :  Arpita Carrington DO (PHYS- Osteopathic)            Attempts of reprinting Rx that was not working - eRx to pharmacy if needed in the next 24-48 hours.  ARPITA CARRINGTON DO

## 2018-01-04 NOTE — PROGRESS NOTES
Patient Information     Patient Name MRN Sex Nicky Ritchie [id number] Female 1995      Progress Notes by Arpita Carrington DO at 3/24/2017  9:45 AM     Author:  Arpita Carrington DO  Service:  (none) Author Type:  PHYS- Osteopathic     Filed:  3/24/2017 12:30 PM  Encounter Date:  3/24/2017 Status:  Addendum     :  Arpita Carrington DO (PHYS- Osteopathic)        Related Notes: Original Note by Arpita Carrington DO (PHYS- Osteopathic) filed at 3/24/2017 12:30 PM            Patient in with complaints of lower abdominal pressure and significant increase in vaginal discharge - described as mucous with some blood in it.  No thin/watery fluid leaking. + fetal movement.    Abd: gravid, otherwise soft.  No distinct uterine tenderness.  Cx: unchanged from last visit.    [place]:  Category I fetal tracing  No uterine contractions.  NST reactive.    Results for orders placed or performed in visit on 17      URINALYSIS W REFLEX MICROSCOPIC IF POSITIVE      Result  Value Ref Range    COLOR                     Yellow Yellow Color    CLARITY                   Slightly Cloudy (A) Clear Clarity    SPECIFIC GRAVITY,URINE    >=1.030 (A) 1.010, 1.015, 1.020, 1.025                    PH,URINE                  6.0 6.0, 7.0, 8.0, 5.5, 6.5, 7.5, 8.5                    UROBILINOGEN,QUALITATIVE  Normal Normal EU/dl    PROTEIN, URINE Negative Negative mg/dL    GLUCOSE, URINE Negative Negative mg/dL    KETONES,URINE             Negative Negative mg/dL    BILIRUBIN,URINE           Negative Negative                    OCCULT BLOOD,URINE        Small (A) Negative                    NITRITE                   Negative Negative                    LEUKOCYTE ESTERASE        Trace (A) Negative                   URINALYSIS MICROSCOPIC      Result  Value Ref Range    RBC 6-10 (A) 0-2, None Seen /HPF    WBC 0-2 0-2, 3-5, None Seen /HPF    BACTERIA                  Few None Seen, Rare, Occasional, Few Bacteria/HPF    EPITHELIAL  CELLS          Many (A) None Seen, Few Epi/HPF    OTHER Mucus Present     HYALINE CASTS 0-2 0-2, 3-5 /LPF                  Discussed s/s of labor progression.  If more pain develops - return to clinic/[facility].  No evidence of urinary infection.  Continue with plan of OB appointment on Tuesday if no other changes.

## 2018-01-04 NOTE — PROGRESS NOTES
Patient Information     Patient Name MRN Sex Nicky Ritchie [id number] Female 1995      Progress Notes by Zoie Cho RN at 2017 10:30 AM     Author:  Zoie Cho RN Service:  (none) Author Type:  NURS- Registered Nurse     Filed:  2017 11:31 AM Encounter Date:  2017 Status:  Signed     :  Zoie Cho RN (NURS- Registered Nurse)             Post Discharge Breast Feeding Assessment Summary (Mother)       Infant Information:     Infant Name: Roger Hart     YOB: 2015     Current Age: 4 days     Gestational Age: 40 1/7 weeks    Presenting Problem:  Concerns as stated by parent(s): no concerns    PREGNANCY, LABOR AND DELIVERY HISTORY:     Breast Changes: No     Pregnancy Complications: None     Birth Complications: Fetal Distress/ Nonreassuring FHT     Type of Delivery:      Anesthesia: Epidural    MOTHERS HISTORY:     Chronic/acute medical conditions:   Past Medical History:     Diagnosis  Date     Chronic cough 02     Distal radius fracture 2006    Left arm      Menarche         History of breast surgery or biopsy: No     Pain Assessment: No pain            Previous Breastfeeding Experience: No     Breastfeeding Goal: 6 Months     Special equipment used: 24 mm    CURRENT FEEDING PRACTICE:  In the last 24 hours     INTAKE:        When does mother report milk came in:  Day 3        Number of effective Breastfeedings: 10-12        Number of attempts: 10-12        Number of supplements: 0        Method of Supplementing: none        Type of supplement: none        Amount Given in the last 24 hours: 0          PUMPING:        Pump used: Pump in Style        Breast pumping times in last 24 hours: 3-4        Minutes per pumping session:  20-30                       Mom's comfort during feeding: denies pain        Moms' positioning/latch technique: Demonstrated correctly        Breast/nipple assessment:          Nipple Eversion:              Right-Minimal eversion    Left-Minimal eversion                     Position used during feeding:  traditional cradle        Summary of Problems Noted: no problems noted  Summary Assessment of Visit:  Effective breastfeeding observed    Consultation Instruction On:  See Education Activity   Correct positioning  Correct latch  Assessment of feeding adequacy  Normals of feeding frequency  Infant cues of feeding readiness  Breast pump use  Indications for supplementation   Techniques to manage forceful let-down  Management of abundant supply  Treatment of engorgement  Treatment of nipple trauma  Signs of infant pain/ irritability  Comfort measures for infant  Use of nipple shield  Milk supply support  Milk collection and storage  Breast compression  Breast massage  Hand expression                                   FOLLOW-UP:      Patient/Family encouraged to follow-up with Provider as scheduled.      Total time spent with patient: 60 minutes

## 2018-01-04 NOTE — TELEPHONE ENCOUNTER
Patient Information     Patient Name MRN Nicky Dunaway [id number] Female 1995      Telephone Encounter by Sondra Bello at 2017 10:26 AM     Author:  Sondra Bello Service:  (none) Author Type:  (none)     Filed:  2017 10:26 AM Encounter Date:  2017 Status:  Signed     :  Sondra Bello            Left message for Nicky to return call.  Sondra Bello LPN............................... 2017 10:26 AM

## 2018-01-04 NOTE — PATIENT INSTRUCTIONS
Patient Information     Patient Name MRN Sex Nicky Ritchie [id number] Female 1995      Patient Instructions by Zoie Cho RN at 2017 10:30 AM     Author:  Zoie Cho RN Service:  (none) Author Type:  NURS- Registered Nurse     Filed:  2017 10:45 AM Encounter Date:  2017 Status:  Signed     :  Zoie Cho RN (NURS- Registered Nurse)            *  Breastfeed your child at least 8 to 12 times in a 24 hour period.    *  Look for early feeding cues such as rooting, rapid eye movement, hands/fists to         mouth, ect.    *  Feed on the first breast without time restriction, offer the second breast    *  Observe for visible suck/audible swallow    *  Make sure the latch/positioning is correct.  Readjust immediately if needed.    *  Call or return to clinic if having signs or symptoms of mastitis such as:   Breast tissue that is hot, inflamed, painful to touch.  Fever, body aches, chills.    *  Try to increase your caloric intake by about 500 calories and continue to take your       prenatal vitamin the entire duration of breastfeeding.     *  Return to the clinic for your next scheduled appointment with your provider or sooner if     you are having any concerns or issues.

## 2018-01-04 NOTE — PROGRESS NOTES
Patient Information     Patient Name MRN Sex Nicky Ritchie [id number] Female 1995      Progress Notes by Arpita Carrington DO at 2017 12:45 PM     Author:  Arpita Carrington DO Service:  (none) Author Type:  PHYS- Osteopathic     Filed:  2017  7:43 AM Encounter Date:  2017 Status:  Signed     :  Arpita Carrington DO (PHYS- Osteopathic)            No vb, LOF  No HA  Vertex: yes  GBS: negative  Weight: 47# TWG  Substance use no  Labor/delivery plan: yes, will return on Friday to discuss possible induction.  Questions answered  RTC Friday  ARPITA CARRINGTON DO

## 2018-01-04 NOTE — PROGRESS NOTES
Patient Information     Patient Name MRN Sex Nicky Ritchie [id number] Female 1995      Progress Notes by Arpita Carrington DO at 2017 12:45 PM     Author:  Arpita Carrington DO Service:  (none) Author Type:  PHYS- Osteopathic     Filed:  2017  1:16 PM Encounter Date:  2017 Status:  Signed     :  Arpita Carrington DO (PHYS- Osteopathic)            No vb, LOF  No HA  Vertex: yes  GBS: negative  Weight: 49# TWG  Substance use: no  Labor/delivery plan: yes, open minded to epidural.  Induction scheduled on Monday 4/10/2107 @ 0530.  Questions answered  Present to [facility] in the meantime with any concerns.  ARPITA CARRINGTON DO

## 2018-01-04 NOTE — PROGRESS NOTES
Patient Information     Patient Name MRN Sex Nicky Ritchie [id number] Female 1995      Progress Notes by Arpita Carrington DO at 2017 12:45 PM     Author:  Arpita Carrington DO Service:  (none) Author Type:  PHYS- Osteopathic     Filed:  2017  2:25 PM Encounter Date:  2017 Status:  Signed     :  Arpita Carrington DO (PHYS- Osteopathic)            SUBJECTIVE:    Nicky Hayes is a 22 y.o. female who presents for rash.    HPI  Nicky is here for concerns of a rash on her vaginal region x 4 days.  Getting worse.  Painful to urinate or defecate.  Started on Saturday - itchy perineum.  /Monday changed from itchy to more sore.  Looked  & noticed rash.  Tried Desitin without relief.  Also concerned re: the appearance of her vagina after childbirth.    Allergies      Allergen   Reactions     Other [Unlisted Allergen (Include Detail In Comments)]  Anaphylaxis     Bugs      Dust [House Dust]  Itching     Grass Pollen-[place], Standard  Itching     Mold Extracts  Itching     Pollen Extracts  Itching     Tree Nut  Itching   ,   Current Outpatient Prescriptions on File Prior to Visit       Medication  Sig Dispense Refill     amoxicillin-clavulanate 875-125 mg tablet (AUGMENTIN) Take 1 tablet by mouth 2 times daily with meals for 10 days. 20 tablet 0     EPINEPHrine (EPIPEN) 0.3 mg/0.3 mL (1:1,000) injection Inject 0.3 mg intramuscular one time if needed for Allergic Reaction for up to 1 dose. 1 Each 2     prenatal vitamin-folic acid 1 mg (PRENATAL VITAMIN) tablet/capsule Take 1 tablet by mouth once daily. 90 tablet 4     sertraline (ZOLOFT) 25 mg tablet Take 1 tablet by mouth once daily. 30 tablet 5     SUMAtriptan (IMITREX) 25 mg tablet Take 1 tablet by mouth every 2 hours if needed for Migraine. Max dose: 200mg per 24 hrs. 6 tablet 0     No current facility-administered medications on file prior to visit.     and   Past Medical History:     Diagnosis  Date     Chronic cough  09/19/02     Distal radius fracture 09/2006    Left arm      Menarche 02/07     Pregnancy        REVIEW OF SYSTEMS:  Review of Systems   All other systems reviewed and are negative.  No bleeding/clots; no fever/chills.      OBJECTIVE:  BP 92/62  Pulse 80  Wt 73.3 kg (161 lb 9.6 oz)  LMP 06/25/2016    EXAM:   Physical Exam   Constitutional: She is well-developed, well-nourished, and in no distress.   HENT:   Head: Normocephalic and atraumatic.   Genitourinary: Vulva exhibits erythema, rash and tenderness. Vulva exhibits no exudate and no lesion.   Genitourinary Comments: Vicryl sutures still present in posterior vaginal wall.   Vitals reviewed.      ASSESSMENT/PLAN:    ICD-10-CM    1. Yeast vaginitis B37.3 fluconazole (DIFLUCAN) 150 mg tablet        Plan:   Vulvar candidiasis with erythematous rash, likely acute after starting antibiotics.  Rx for diflucan x 1 dose; repeat if necessary after 1 week.  Cool compresses, tub soaks, and miconazole topically for symptomatic relief.  Follow up if not improving.

## 2018-01-04 NOTE — ADDENDUM NOTE
Patient Information     Patient Name MRN Sex Nicky Ritchie [id number] Female 1995      Addendum Note by Arpita Carrington DO at 3/24/2017 12:31 PM     Author:  Arpita Carrington DO Service:  (none) Author Type:  PHYS- Osteopathic     Filed:  3/24/2017 12:31 PM Encounter Date:  3/24/2017 Status:  Signed     :  Arpita Carrington DO (PHYS- Osteopathic)       Addended by: ARPITA CARRINGTON on: 3/24/2017 12:31 PM        Modules accepted: Orders

## 2018-01-04 NOTE — TELEPHONE ENCOUNTER
Patient Information     Patient Name MRN Nicky Dunaway [id number] Female 1995      Telephone Encounter by Sheng Cano at 2017  4:53 PM     Author:  Sheng Cano Service:  (none) Author Type:  (none)     Filed:  2017  4:55 PM Encounter Date:  2017 Status:  Signed     :  Sheng Cano            SMS-PT called would like to see SMS tomorrow 2017 if possible. She states she is on a antibiotic and has a rash. Would like a call 2017 prefers to see SMS, didn't want to make an appt with different provider.   Thank you,  Sheng Cano ....................  2017   4:54 PM

## 2018-01-05 NOTE — PROGRESS NOTES
Patient Information     Patient Name MRN Sex     Nicky Hayes [id number] Female 1995      Progress Notes by Jeremías Hazel MD at 2017  1:45 PM     Author:  Jeremías Hazel MD Service:  (none) Author Type:  Physician     Filed:  2017  4:28 PM Encounter Date:  2017 Status:  Signed     :  Jeremías Hazel MD (Physician)            SUBJECTIVE:    Nicky Hayes is a 22 y.o. female who presents for postop check after dilation and curettage for endomyometritis and retained products of conception.    HPI  Procedure was complicated by uterine perforation without further incident. She is feeling well. Spotting lightly, no fever, minimal LLQ tenderness intermittently. No bowel or bladder concerns.  Pathology was reviewed confirming endometritis, retained villi.    Allergies      Allergen   Reactions     Other [Unlisted Allergen (Include Detail In Comments)]  Anaphylaxis     Bugs      Dust [House Dust]  Itching     Grass Pollen-[place], Standard  Itching     Mold Extracts  Itching     Pollen Extracts  Itching     Tree Nut  Itching   ,   Current Outpatient Prescriptions on File Prior to Visit       Medication  Sig Dispense Refill     acetaminophen (TYLENOL EXTRA STRGTH) 500 mg tablet Take 1,000 mg by mouth every 4 hours if needed. Max acetaminophen dose: 4000mg in 24 hrs.   Indications: Fever       EPINEPHrine (EPIPEN) 0.3 mg/0.3 mL (1:1,000) injection Inject 0.3 mg intramuscular one time if needed for Allergic Reaction for up to 1 dose. 1 Each 2     ibuprofen (ADVIL; MOTRIN) 200 mg tablet Take 400 mg by mouth every 4 hours if needed for Temp>101.5F (38.6C).       prenatal vitamin-folic acid 1 mg (PRENATAL VITAMIN) tablet/capsule Take 1 tablet by mouth once daily. 90 tablet 4     sertraline (ZOLOFT) 25 mg tablet Take 1 tablet by mouth once daily. 30 tablet 5     SUMAtriptan (IMITREX) 25 mg tablet Take 1 tablet by mouth every 2 hours if needed for Migraine. Max dose: 200mg per 24 hrs. 6  tablet 0     No current facility-administered medications on file prior to visit.    ,   Past Medical History:     Diagnosis  Date     Chronic cough 09/19/02     Distal radius fracture 09/2006    Left arm      Menarche 02/07     Pregnancy    ,   Past Surgical History:      Procedure  Laterality Date     [place] DILATION AND CURETTAGE  5/17/2017           and   Social History       Substance Use Topics         Smoking status:   Never Smoker     Smokeless tobacco:   Never Used     Alcohol use   No      Comment: rare        REVIEW OF SYSTEMS:  Review of Systems   Constitutional: Negative for chills and fever.   Gastrointestinal: Negative.    Genitourinary: Negative.        OBJECTIVE:  /74  Pulse 88  Temp 98  F (36.7  C) (Tympanic)  Wt 72.3 kg (159 lb 6.4 oz)  LMP 06/25/2016    EXAM:   Physical Exam   Abdominal: Soft. She exhibits no distension. There is no tenderness. There is no rebound.       ASSESSMENT/PLAN:    ICD-10-CM    1. Encounter for contraceptive management, unspecified type Z30.9 norethindrone, Contraceptive, (MICRONOR, 28,) 0.35 mg tablet        Plan:  Stable. Discussed contraception. She would like to go on Micronor at this time with her breastfeeding. Instructions provided.    Based on what occurred in the visit today:  Previous medication(s) were discontinued or altered? No  Previous medication(s) were suspended pending consultation? No  New medication(s) started? No

## 2018-01-05 NOTE — PROGRESS NOTES
Patient Information     Patient Name MRN Sex Nicky Ritchie [id number] Female 1995      Progress Notes by Jeremías Hazel MD at 2017 10:00 AM     Author:  Jeremías Hazel MD Service:  (none) Author Type:  Physician     Filed:  2017 11:53 AM Encounter Date:  2017 Status:  Signed     :  Jeremías Hazel MD (Physician)            SUBJECTIVE:    Nicky Hayes is a 22 y.o. female who presents for evaluation of pelvic pain and fever, now postpartum five weeks.    HPI  She had prior US a few weeks ago showing an echogenic intrauterine area possibly suspicious for retained POC's. She is fevering since last night with foul vaginal discharge, nausea, malaise.  Results for orders placed or performed in visit on 17      CBC WITH AUTO DIFFERENTIAL      Result  Value Ref Range    WHITE BLOOD COUNT         19.0 (H) 4.5 - 11.0 thou/cu mm    RED BLOOD COUNT           4.82 4.00 - 5.20 mil/cu mm    HEMOGLOBIN                14.9 12.0 - 16.0 g/dL    HEMATOCRIT                43.2 33.0 - 51.0 %    MCV                       90 80 - 100 fL    MCH                       30.9 26.0 - 34.0 pg    MCHC                      34.5 32.0 - 36.0 g/dL    RDW                       12.5 11.5 - 15.5 %    PLATELET COUNT            315 140 - 440 thou/cu mm    MPV                       9.9 6.5 - 11.0 fL    NEUTROPHILS               85.5 (H) 42.0 - 72.0 %    LYMPHOCYTES               5.0 (L) 20.0 - 44.0 %    MONOCYTES                 3.8 <12.0 %    EOSINOPHILS               4.7 <8.0 %    BASOPHILS                 0.5 <3.0 %    ABSOLUTE NEUTROPHILS      16.2 (H) 1.7 - 7.0 thou/cu mm    ABSOLUTE LYMPHOCYTES      1.0 0.9 - 2.9 thou/cu mm    ABSOLUTE MONOCYTES        0.7 <0.9 thou/cu mm    ABSOLUTE EOSINOPHILS      0.9 (H) <0.5 thou/cu mm    ABSOLUTE BASOPHILS        0.1 <0.3 thou/cu mm       Allergies      Allergen   Reactions     Other [Unlisted Allergen (Include Detail In Comments)]  Anaphylaxis     Bugs      Dust  [House Dust]  Itching     Grass Pollen-[place], Standard  Itching     Mold Extracts  Itching     Pollen Extracts  Itching     Tree Nut  Itching   , No family history on file.,   Current Outpatient Prescriptions on File Prior to Visit       Medication  Sig Dispense Refill     EPINEPHrine (EPIPEN) 0.3 mg/0.3 mL (1:1,000) injection Inject 0.3 mg intramuscular one time if needed for Allergic Reaction for up to 1 dose. 1 Each 2     prenatal vitamin-folic acid 1 mg (PRENATAL VITAMIN) tablet/capsule Take 1 tablet by mouth once daily. 90 tablet 4     sertraline (ZOLOFT) 25 mg tablet Take 1 tablet by mouth once daily. 30 tablet 5     SUMAtriptan (IMITREX) 25 mg tablet Take 1 tablet by mouth every 2 hours if needed for Migraine. Max dose: 200mg per 24 hrs. 6 tablet 0     No current facility-administered medications on file prior to visit.    ,   Past Medical History:     Diagnosis  Date     Chronic cough 09/19/02     Distal radius fracture 09/2006    Left arm      Menarche 02/07     Pregnancy    , No past surgical history on file. and   Social History       Substance Use Topics         Smoking status:   Never Smoker     Smokeless tobacco:   Never Used     Alcohol use   No      Comment: rare        REVIEW OF SYSTEMS:  Review of Systems   Constitutional: Positive for chills, fever and malaise/fatigue.   Gastrointestinal: Positive for nausea.   Genitourinary: Positive for dysuria.   Musculoskeletal: Positive for back pain.       OBJECTIVE:  /84  Pulse 84  Temp 97.5  F (36.4  C) (Tympanic)  Wt 73.4 kg (161 lb 12.8 oz)  LMP 06/25/2016  Breastfeeding? Yes    EXAM:   Physical Exam   Constitutional: She is well-developed, well-nourished, and in no distress.   Abdominal: Soft. She exhibits no distension and no mass. There is tenderness. There is no rebound and no guarding.   Genitourinary: Vulva normal. Uterus is tender. Uterus is not enlarged. Cervix exhibits motion tenderness. Watery  thin  odorless  clear and vaginal  discharge found.       ASSESSMENT/PLAN:    ICD-10-CM    1. Fever, unspecified fever cause R50.9 CBC WITH DIFFERENTIAL      URINALYSIS W REFLEX MICROSCOPIC IF POSITIVE      URINE CULTURE      CBC WITH DIFFERENTIAL      CBC WITH AUTO DIFFERENTIAL      URINALYSIS W REFLEX MICROSCOPIC IF POSITIVE      URINE CULTURE      URINALYSIS MICROSCOPIC      URINALYSIS MICROSCOPIC      CANCELED: US PELVIS COMPLETE TA AND TV   2. Vaginal discharge N89.8 CANCELED: WET PREP GENITAL        Plan:  Suspicious for endomyometritis. Will hospitalize on IV antibiotics, plan for D&C for retained POC tomorrow.    Jeremías Hazel MD FACOG  11:45 AM 5/16/2017

## 2018-01-05 NOTE — H&P
Patient Information     Patient Name MRN Sex Nicky Ritchie [id number] Female 1995      H&P by Jeremías Hazel MD at 2017 11:48 AM     Author:  Jeremías Hazel MD Service:  (none) Author Type:  Physician     Filed:  2017 11:52 AM Date of Service:  2017 11:48 AM Status:  Signed     :  Jeremías Hazel MD (Physician)            OB ADMISSION NOTE    CHIEF COMPLAINT:  Postpartum delayed endomyometritis, retained products of conception.    OBSTETRICAL / DATING HISTORY:  Estimated Date of Delivery: 17  Gestational Age:  40w1d    OB History                    Para  Term     AB  Living     1   1  1  0   0  0     SAB   TAB  Ectopic  Multiple   Live Births       0   0  0  0                           # Outcome Date  GA  Lbr Surinder/2nd  Weight Sex  Delivery Anes PTL Lv   1 Term 04/10/17  40w1d    3.311 kg (7 lb 4.8 oz) M  Vag EPI        Birth Comments:  screen: pending  CCHD: pass  Hearing screen: pass                                       TESTING:  [facility] Resulted Labs:  Recent Labs       16   1025   ABORH  A Rh Negative     Recent Labs         17   0444   16   1025   TREPONEPALLI  Negative   < >  Negative   HBSAG   --    --   Nonreactive    < > = values in this interval not displayed.                  PAST MEDICAL HISTORY:  Past Medical History:     Diagnosis  Date     Chronic cough 02     Distal radius fracture 2006    Left arm      Menarche      Pregnancy        PAST SURGICAL HISTORY:  No past surgical history on file.    FAMILY HISTORY:  No family history on file.     ALLERGIES:  Allergies      Allergen   Reactions     Other [Unlisted Allergen (Include Detail In Comments)]  Anaphylaxis     Bugs      Dust [House Dust]  Itching     Grass Pollen-[place], Standard  Itching     Mold Extracts  Itching     Pollen Extracts  Itching     Tree Nut  Itching       HABITS:  none    HISTORY OF PRESENT ILLNESS:    (Please see scanned   sheets for prenatal history. Examination at the time of admission revealed no interval change in the patient s history or physical exam except as described below.)    Patient presented to clinic five weeks postpartum with fever, abdominal pain and nausea. She had previously been noted to have an echogenic intrauterine mass at 3 weeks postpartum, but was thought to have passed it as her symptoms of pain and bleeding had resolved.  She does note foul discharge, fever and pain worsening last night with some nausea today. Her baby has a persistent rash as well.       REVIEW OF SYSTEMS:  A comprehensive review of systems was negative.    PHYSICAL EXAM: LMP 2016 General Appearance: normal affect  Cardiovascular:  Normal.  Respiratory:  normal  Abdomen:  Tender RLQ and pelvic is tender anteriorly with some pain on the right, no adnexal mass or left sided tenderness, some CMT.      Impression:  Retained POC with postpartum endomyometritis.    Plan:  Admit for IV triple antibiotics, D&C tomorrow if afebrile and stable. Plan for NPO after midnight.    Jeremías Hazel MD FACOG  11:52 AM 2017

## 2018-01-05 NOTE — ADDENDUM NOTE
Patient Information     Patient Name MRN Nicky Dunaway [id number] Female 1995      Addendum Note by Bernadine Hazel MD at 2017 11:55 AM     Author:  Bernadine Hazel MD Service:  (none) Author Type:  Physician     Filed:  2017 11:55 AM Encounter Date:  2017 Status:  Signed     :  Bernadine Hazel MD (Physician)       Addended by: BERNADINE HAZEL on: 2017 11:55 AM        Modules accepted: Orders

## 2018-01-12 ENCOUNTER — OFFICE VISIT - GICH (OUTPATIENT)
Dept: OBGYN | Facility: OTHER | Age: 23
End: 2018-01-12

## 2018-01-12 ENCOUNTER — HISTORY (OUTPATIENT)
Dept: OBGYN | Facility: OTHER | Age: 23
End: 2018-01-12

## 2018-01-12 DIAGNOSIS — Z30.09 ENCOUNTER FOR OTHER GENERAL COUNSELING AND ADVICE ON CONTRACEPTION: ICD-10-CM

## 2018-01-25 VITALS
DIASTOLIC BLOOD PRESSURE: 70 MMHG | HEART RATE: 84 BPM | WEIGHT: 171.2 LBS | DIASTOLIC BLOOD PRESSURE: 68 MMHG | DIASTOLIC BLOOD PRESSURE: 78 MMHG | DIASTOLIC BLOOD PRESSURE: 68 MMHG | DIASTOLIC BLOOD PRESSURE: 74 MMHG | SYSTOLIC BLOOD PRESSURE: 122 MMHG | WEIGHT: 166.8 LBS | BODY MASS INDEX: 31.33 KG/M2 | WEIGHT: 159.4 LBS | HEIGHT: 65 IN | SYSTOLIC BLOOD PRESSURE: 102 MMHG | TEMPERATURE: 98.3 F | BODY MASS INDEX: 25.52 KG/M2 | TEMPERATURE: 98 F | SYSTOLIC BLOOD PRESSURE: 106 MMHG | HEART RATE: 80 BPM | HEART RATE: 100 BPM | WEIGHT: 153.2 LBS | WEIGHT: 186.8 LBS | HEART RATE: 88 BPM | HEART RATE: 100 BPM | SYSTOLIC BLOOD PRESSURE: 100 MMHG | SYSTOLIC BLOOD PRESSURE: 92 MMHG

## 2018-01-25 VITALS
BODY MASS INDEX: 28.96 KG/M2 | HEART RATE: 76 BPM | WEIGHT: 188.4 LBS | WEIGHT: 187.2 LBS | DIASTOLIC BLOOD PRESSURE: 78 MMHG | WEIGHT: 175.4 LBS | BODY MASS INDEX: 31.11 KG/M2 | SYSTOLIC BLOOD PRESSURE: 118 MMHG | HEART RATE: 84 BPM | DIASTOLIC BLOOD PRESSURE: 72 MMHG | DIASTOLIC BLOOD PRESSURE: 76 MMHG | SYSTOLIC BLOOD PRESSURE: 118 MMHG | HEART RATE: 112 BPM | BODY MASS INDEX: 30.91 KG/M2 | SYSTOLIC BLOOD PRESSURE: 118 MMHG

## 2018-01-25 VITALS
HEART RATE: 72 BPM | SYSTOLIC BLOOD PRESSURE: 122 MMHG | WEIGHT: 189.8 LBS | TEMPERATURE: 97.5 F | WEIGHT: 161.8 LBS | BODY MASS INDEX: 27.37 KG/M2 | WEIGHT: 163.2 LBS | HEART RATE: 84 BPM | DIASTOLIC BLOOD PRESSURE: 84 MMHG | SYSTOLIC BLOOD PRESSURE: 104 MMHG | BODY MASS INDEX: 31.34 KG/M2 | SYSTOLIC BLOOD PRESSURE: 104 MMHG | BODY MASS INDEX: 26.72 KG/M2 | HEART RATE: 84 BPM | DIASTOLIC BLOOD PRESSURE: 84 MMHG | DIASTOLIC BLOOD PRESSURE: 62 MMHG

## 2018-01-25 VITALS
HEART RATE: 104 BPM | BODY MASS INDEX: 30.72 KG/M2 | SYSTOLIC BLOOD PRESSURE: 108 MMHG | DIASTOLIC BLOOD PRESSURE: 72 MMHG | WEIGHT: 186 LBS

## 2018-01-25 VITALS — WEIGHT: 181 LBS | SYSTOLIC BLOOD PRESSURE: 110 MMHG | DIASTOLIC BLOOD PRESSURE: 78 MMHG | HEART RATE: 98 BPM

## 2018-01-25 VITALS
SYSTOLIC BLOOD PRESSURE: 128 MMHG | WEIGHT: 187.8 LBS | BODY MASS INDEX: 31.01 KG/M2 | DIASTOLIC BLOOD PRESSURE: 74 MMHG | HEART RATE: 100 BPM

## 2018-01-25 VITALS
WEIGHT: 161.6 LBS | HEART RATE: 80 BPM | BODY MASS INDEX: 26.69 KG/M2 | DIASTOLIC BLOOD PRESSURE: 62 MMHG | SYSTOLIC BLOOD PRESSURE: 92 MMHG

## 2018-01-26 ENCOUNTER — AMBULATORY - GICH (OUTPATIENT)
Dept: OBGYN | Facility: OTHER | Age: 23
End: 2018-01-26

## 2018-01-26 ENCOUNTER — COMMUNICATION - GICH (OUTPATIENT)
Dept: OBGYN | Facility: OTHER | Age: 23
End: 2018-01-26

## 2018-01-26 ENCOUNTER — HISTORY (OUTPATIENT)
Dept: OBGYN | Facility: OTHER | Age: 23
End: 2018-01-26

## 2018-01-26 DIAGNOSIS — Z53.8 PROCEDURE AND TREATMENT NOT CARRIED OUT FOR OTHER REASONS: ICD-10-CM

## 2018-01-26 DIAGNOSIS — Z01.812 ENCOUNTER FOR PREPROCEDURAL LABORATORY EXAMINATION: ICD-10-CM

## 2018-01-26 LAB — HCG UR QL: NEGATIVE

## 2018-02-09 VITALS
BODY MASS INDEX: 21.66 KG/M2 | HEART RATE: 78 BPM | SYSTOLIC BLOOD PRESSURE: 112 MMHG | HEIGHT: 65 IN | DIASTOLIC BLOOD PRESSURE: 62 MMHG | WEIGHT: 130 LBS

## 2018-02-09 VITALS
SYSTOLIC BLOOD PRESSURE: 112 MMHG | DIASTOLIC BLOOD PRESSURE: 68 MMHG | WEIGHT: 130 LBS | HEART RATE: 82 BPM | BODY MASS INDEX: 21.63 KG/M2

## 2018-02-09 VITALS
TEMPERATURE: 99.7 F | HEART RATE: 91 BPM | DIASTOLIC BLOOD PRESSURE: 68 MMHG | WEIGHT: 132 LBS | SYSTOLIC BLOOD PRESSURE: 122 MMHG | BODY MASS INDEX: 21.97 KG/M2

## 2018-02-12 NOTE — PATIENT INSTRUCTIONS
Patient Information     Patient Name MRN Nicky Dunaway [id number] Female 1995      Patient Instructions by Daisy Piña NP at 2017  3:15 PM     Author:  Daisy Piña NP Service:  (none) Author Type:  PHYS- Nurse Practitioner     Filed:  2017  4:12 PM Encounter Date:  2017 Status:  Signed     :  Daisy Pñia NP (PHYS- Nurse Practitioner)               Index Khmer   Acute Bronchitis: Brief Version   What is acute bronchitis?  When you have acute bronchitis, the airways between your windpipe and your lungs are swollen and irritated. It is also called a chest cold.  What is the cause?  Acute bronchitis is most often caused by a virus, like a cold or the flu. Less often, it can be caused by bacteria.  Most of the time, it clears up in several days, but the cough can take longer to go away. It may take you longer to get better if:    You smoke cigarettes.    You have a heart or lung disease or other health problems.    There s a lot of air pollution or secondhand smoke where you live or work.  What are the symptoms?  You may:    Have a deep cough with yellowish or greenish mucus.    Feel pain in your chest when you breathe deeply or cough.    Wheeze or feel short of breath.    Have a fever or chills.  How can I take care of myself?  Rest at home and drink plenty of fluids to keep the mucus loose. Don t smoke, and stay away from others who are smoking. You should get better in a several days.  If your symptoms are severe or you have heart or lung disease or diabetes, you may need to see your healthcare provider or take medicine.  How can I help prevent acute bronchitis?  You can lower your chances of getting bronchitis if you wash your hands after using the restroom, coughing, sneezing, or blowing your nose. Also wash your hands before eating or touching your eyes.  Developed by [facility].  Adult Advisor 2017.2 published by  [employer].  Last modified: 2016-06-29  Last reviewed: 2016-06-08  This content is reviewed periodically and is subject to change as new health information becomes available. The information is intended to inform and educate and is not a replacement for medical evaluation, advice, diagnosis or treatment by a healthcare professional.  References   Adult Advisor 2017.2 Index    Copyright   2017 [employer], a division of [employer] All rights reserved.

## 2018-02-12 NOTE — NURSING NOTE
Patient Information     Patient Name MRN Sex Nicky Ritchie [id number] Female 1995      Nursing Note by Minna Newell at 2017  3:15 PM     Author:  Minna Newell Service:  (none) Author Type:  (none)     Filed:  2017  4:05 PM Encounter Date:  2017 Status:  Signed     :  Minna Newell            Patient states that cough has been at least 3 days, has had bronchitis before and thinks that she has it  Minna Newell LPN 2017 3:53 PM

## 2018-02-12 NOTE — PROGRESS NOTES
Patient Information     Patient Name MRN Sex Nicky Ritchie [id number] Female 1995      Progress Notes by Daisy Piña NP at 2017  3:15 PM     Author:  Daisy Piña NP Service:  (none) Author Type:  PHYS- Nurse Practitioner     Filed:  2017  2:52 PM Encounter Date:  2017 Status:  Signed     :  Daisy Piña NP (PHYS- Nurse Practitioner)            HPI:  Nursing Notes:   Minna Newell  2017  4:05 PM  Signed  Patient states that cough has been at least 3 days, has had bronchitis before and thinks that she has it  Minna Newell LPN 2017 3:53 PM      Nicky Hayes is a 22 y.o. female who presents to clinic today for sore throat, cough, wheezing, sinuses are clogged up- symptoms started three days ago. Treating with Mucinex DM which helped a little bit. Denies fevers, ear pain. Eating an drinking without difficulty.     Past Medical History:     Diagnosis  Date     Chronic cough 02     Distal radius fracture 2006    Left arm      Menarche      Pregnancy      Past Surgical History:      Procedure  Laterality Date     [place] DILATION AND CURETTAGE  2017            Social History       Substance Use Topics         Smoking status:   Never Smoker     Smokeless tobacco:   Never Used     Alcohol use   No      Comment: rare      Current Outpatient Prescriptions       Medication  Sig Dispense Refill     EPINEPHrine (EPIPEN) 0.3 mg/0.3 mL (1:1,000) injection Inject 0.3 mg intramuscular one time if needed for Allergic Reaction for up to 1 dose. 1 Each 2     norethindrone, Contraceptive, (MICRONOR, 28,) 0.35 mg tablet Take 1 tablet by mouth once daily. 3 Package 3     prenatal vitamin-folic acid 1 mg (PRENATAL VITAMIN) tablet/capsule Take 1 tablet by mouth once daily. 90 tablet 4     No current facility-administered medications for this visit.      Medications have been reviewed by me and are current to the best of  my knowledge and ability.    Allergies      Allergen   Reactions     Other [Unlisted Allergen (Include Detail In Comments)]  Anaphylaxis     Bugs      Dust [House Dust]  Itching     Grass Pollen-[place], Standard  Itching     Mold Extracts  Itching     Pollen Extracts  Itching     Tree Nut  Itching       ROS:  Refer to HPI    /68 (Cuff Site: Right Arm, Position: Sitting, Cuff Size: Adult Regular)  Pulse 91  Temp 99.7  F (37.6  C) (Tympanic)   Wt 59.9 kg (132 lb)    EXAM:  General Appearance: Well appearing female appropriate appearance for age. No acute distress  Ears: Left TM with bony landmarks appreciated with cone of light, no erythema, no effusion, no bulging, no purulence.  Right TM with bony landmarks appreciated with cone of light, no erythema, no effusion, no bulging, no purulence.   Left auditory canal clear, Right auditory canal clear, normal external ears, non tender.  Orophayrnx: moist mucous membranes, posterior pharynx, tonsils without hypertrophy, no erythema  Neck: supple without adenopathy  Respiratory: normal chest wall and respirations.  Normal effort.  Clear to auscultation bilaterally  Cardiac: RRR   Psychological: normal affect, alert and pleasant    ASSESSMENT/PLAN:    ICD-10-CM    1. Wheezing R06.2 albuterol (PROAIR RESPICLICK) 90 mcg/actuation INHALER   Sore throat, cough, sinuses are clogged up  On exam: well appearing female without fever, lungs clear to auscultation, TMs without erythema, tonsils without erythema  Diagnosis: Viral URI  Treat with Albuterol inhaler every 4-6 hours prn wheezing  Symptomatic treatment  Tylenol or ibuprofen PRN  Follow up if symptoms persist or worsen    Patient Instructions      Index Pashto   Acute Bronchitis: Brief Version   What is acute bronchitis?  When you have acute bronchitis, the airways between your windpipe and your lungs are swollen and irritated. It is also called a chest cold.  What is the cause?  Acute bronchitis is most often  caused by a virus, like a cold or the flu. Less often, it can be caused by bacteria.  Most of the time, it clears up in several days, but the cough can take longer to go away. It may take you longer to get better if:    You smoke cigarettes.    You have a heart or lung disease or other health problems.    There s a lot of air pollution or secondhand smoke where you live or work.  What are the symptoms?  You may:    Have a deep cough with yellowish or greenish mucus.    Feel pain in your chest when you breathe deeply or cough.    Wheeze or feel short of breath.    Have a fever or chills.  How can I take care of myself?  Rest at home and drink plenty of fluids to keep the mucus loose. Don t smoke, and stay away from others who are smoking. You should get better in a several days.  If your symptoms are severe or you have heart or lung disease or diabetes, you may need to see your healthcare provider or take medicine.  How can I help prevent acute bronchitis?  You can lower your chances of getting bronchitis if you wash your hands after using the restroom, coughing, sneezing, or blowing your nose. Also wash your hands before eating or touching your eyes.  Developed by [employer].  Adult Advisor 2017.2 published by [employer].  Last modified: 2016-06-29  Last reviewed: 2016-06-08  This content is reviewed periodically and is subject to change as new health information becomes available. The information is intended to inform and educate and is not a replacement for medical evaluation, advice, diagnosis or treatment by a healthcare professional.  References   Adult Advisor 2017.2 Index    Copyright   2017 [employer], a division of [employer] All rights reserved.

## 2018-02-13 NOTE — NURSING NOTE
Patient Information     Patient Name MRN Sex Nicky Ritchie [id number] Female 1995      Nursing Note by Tatyana Acevedo at 2018  1:30 PM     Author:  Tatyana Acevedo Service:  (none) Author Type:  (none)     Filed:  2018  2:20 PM Encounter Date:  2018 Status:  Signed     :  Tatyana Acevedo            [facility] Protocol    A. Pre-procedure verification complete yes  1-relevant information / documentation available, reviewed and properly matched to the patient; 2-consent accurate and complete, 3-equipment and supplies available    B. Site marking complete N/A  Site marked if not in continuous attendance with patient    C. TIME OUT completed yes  Time Out was conducted just prior to starting procedure to verify the eight required elements: 1-patient identity, 2-consent accurate and complete, 3-position, 4-correct side/site marked (if applicable), 5-procedure, 6-relevant images / results properly labeled and displayed (if applicable), 7-antibiotics / irrigation fluids (if applicable), 8-safety precautions.    Lizbeth Acevedo LPN ....................  2018   1:53 PM

## 2018-02-13 NOTE — TELEPHONE ENCOUNTER
Patient Information     Patient Name MRN Nicky Dunaway [id number] Female 1995      Telephone Encounter by Francie Brewster RN at 2018  2:56 PM     Author:  Francie Brewster RN Service:  (none) Author Type:  NURS- Registered Nurse     Filed:  2018  3:04 PM Encounter Date:  2018 Status:  Signed     :  Francie Brewster RN (NURS- Registered Nurse)            Patient had failed IUD insertion today. She is requesting a prescription for birth control patch be sent to [facility], if this an an acceptable option for use while breastfeeding. She plans to breastfeed for about 3 more months.    Francie Brewster RN ....................  2018   3:04 PM

## 2018-02-13 NOTE — NURSING NOTE
Patient Information     Patient Name MRN Sex Nicky Ritchie [id number] Female 1995      Nursing Note by Tatyana Acevedo at 2018  1:30 PM     Author:  Tatyana Acevedo Service:  (none) Author Type:  (none)     Filed:  2018  2:23 PM Encounter Date:  2018 Status:  Signed     :  Tatyana Acevedo            IUD attempted to be placed, patient did not tolerate.  IUD removed.  Lizbeth Acevedo LPN ....................  2018   2:23 PM

## 2018-02-13 NOTE — TELEPHONE ENCOUNTER
Patient Information     Patient Name MRN Sex Nicky Ritchie [id number] Female 1995      Telephone Encounter by Jeremías Hazel MD at 2018  4:07 PM     Author:  Jeremías Hazel MD Service:  (none) Author Type:  Physician     Filed:  2018  4:08 PM Encounter Date:  2018 Status:  Signed     :  Jeremías Hazel MD (Physician)            Would be better to stay on the mini pill until done breastfeeding, then use the patch.

## 2018-02-13 NOTE — TELEPHONE ENCOUNTER
Patient Information     Patient Name MRN Nicky Dunaway [id number] Female 1995      Telephone Encounter by Francie Brewster RN at 2018  4:10 PM     Author:  Francie Brewster RN Service:  (none) Author Type:  NURS- Registered Nurse     Filed:  2018  4:12 PM Encounter Date:  2018 Status:  Signed     :  Francie Brewster RN (NURS- Registered Nurse)            Patient understands and is okay with this plan.  Francie Brewster RN ....................  2018   4:12 PM

## 2018-02-13 NOTE — PROGRESS NOTES
Patient Information     Patient Name MRN Sex Nicky Ritchie [id number] Female 1995      Progress Notes by Jeremías Hazel MD at 2018 11:15 AM     Author:  Jeremías Hazel MD Service:  (none) Author Type:  Physician     Filed:  2018 11:35 AM Encounter Date:  2018 Status:  Signed     :  Jeremías Hazel MD (Physician)            CC:Birth Control  HPI:  Nicky is a 22 y.o. female who presents for contraceptive counseling.  Patient's last menstrual period was 2017.  Menstrual history: has had one period since the birth of her baby in the summer of , about 9 months ago.  Current Birth Control: Micronor  STI history: Denies  Pap Smears: n/a  Smoking:no  History of DVT/PE:no    OB History                    Para  Term     AB  Living     1   1  1  0   0  0     SAB   TAB  Ectopic  Multiple   Live Births       0   0  0  0                           # Outcome Date  GA  Lbr Surinder/2nd  Weight Sex  Delivery Anes PTL Lv   1 Term 04/10/17  40w1d    3.311 kg (7 lb 4.8 oz) M  Vag EPI        Birth Comments:  screen: pending  CCHD: pass  Hearing screen: pass                                    Past Medical History:     Diagnosis  Date     Chronic cough 02     Distal radius fracture 2006    Left arm      Menarche      Pregnancy      Past Surgical History:      Procedure  Laterality Date     CA DILATION AND CURETTAGE  2017            Social History     Social History        Marital status:  Single     Spouse name: N/A     Number of children:  N/A     Years of education:  N/A     Occupational History      Not on file.     Social History Main Topics         Smoking status:   Never Smoker     Smokeless tobacco:   Never Used     Alcohol use   No      Comment: rare      Drug use:   No     Sexual activity:   Yes     Partners:  Male     Birth control/ protection:  Pill     Other Topics  Concern     Not on file      Social History Narrative     Attends [employer]  "fall 2014.     Doni Mathew-stepfather. Now  from her mother and out of the home.    Minimal contact with her biological father in [place].     Kiley Hayes Mother    Only child    Works at [employer] in [facility]    Signed by Shelly Saeed MD .....11/4/2014 5:49 PM                             No family history on file.   Current Outpatient Prescriptions on File Prior to Visit       Medication  Sig Dispense Refill     albuterol (PROAIR RESPICLICK) 90 mcg/actuation INHALER Inhale 1 Puff by mouth every 4 hours while awake. 1 Inhaler 0     EPINEPHrine (EPIPEN) 0.3 mg/0.3 mL (1:1,000) injection Inject 0.3 mg intramuscular one time if needed for Allergic Reaction for up to 1 dose. 1 Each 2     norethindrone, Contraceptive, (MICRONOR, 28,) 0.35 mg tablet Take 1 tablet by mouth once daily. 3 Package 3     No current facility-administered medications on file prior to visit.      Allergies      Allergen   Reactions     Other [Unlisted Allergen (Include Detail In Comments)]  Anaphylaxis     Bugs      Dust [House Dust]  Itching     Grass Pollen-[place], Standard  Itching     Mold Extracts  Itching     Pollen Extracts  Itching     Tree Nut  Itching           REVIEW OF SYSTEMS:  A comprehensive review of systems was negative.    Bladder concerns: NO  Bowel concerns:NO  Breast concerns:No    EXAM:  Vitals:     01/12/18 1115   BP: 112/62   Cuff Site: Right Arm   Position: Sitting   Cuff Size: Adult Regular   Pulse: 78   Weight: 59 kg (130 lb)   Height: 1.651 m (5' 5\")     General Appearance: Pleasant, alert, appropriate appearance for age. No acute distress  Psychiatric Exam: Alert and oriented, appropriate affect.    Lab:     ASSESSMENT/PLAN :  1. Birth control counseling      Counseling was given regarding contraception including cyclical, continuous hormonal birth control, barrier method, long acting reversible contraception, injectable/implantable contracepiton    Patient is currently wishing to contemplate her " options. She wishes to breastfeed for an additional three months.  She will call with onset of her next menses. Encouraged condom use until she has decided and started a method reliably for two weeks.    TT:20 minutes with over half spent in discussion of management of birth control.    Jeremías Hazel MD FACOG  11:30 AM 1/12/2018

## 2018-02-13 NOTE — PROGRESS NOTES
Patient Information     Patient Name MRN Sex     Nicky Hayes [id number] Female 1995      Progress Notes by Jeremías Hazel MD at 2018  1:30 PM     Author:  Jeremías Hazel MD Service:  (none) Author Type:  Physician     Filed:  2018  2:26 PM Encounter Date:  2018 Status:  Signed     :  Jeremías Hazel MD (Physician)            SUBJECTIVE:    Nicky Hayes is a 22 y.o. female who presents for Mirena IUD insertion    HPI  She had started the mini-pill and is interested in switching to Mirena  Allergies      Allergen   Reactions     Other [Unlisted Allergen (Include Detail In Comments)]  Anaphylaxis     Bugs      Dust [House Dust]  Itching     Grass Pollen-[place], Standard  Itching     Mold Extracts  Itching     Pollen Extracts  Itching     Tree Nut  Itching    and   Current Outpatient Prescriptions on File Prior to Visit       Medication  Sig Dispense Refill     albuterol (PROAIR RESPICLICK) 90 mcg/actuation INHALER Inhale 1 Puff by mouth every 4 hours while awake. 1 Inhaler 0     EPINEPHrine (EPIPEN) 0.3 mg/0.3 mL (1:1,000) injection Inject 0.3 mg intramuscular one time if needed for Allergic Reaction for up to 1 dose. 1 Each 2     No current facility-administered medications on file prior to visit.        REVIEW OF SYSTEMS:  Review of Systems   All other systems reviewed and are negative.      OBJECTIVE:  /68 (Cuff Site: Right Arm, Position: Sitting, Cuff Size: Adult Regular)  Pulse 82  Wt 59 kg (130 lb)  LMP 2018  Breastfeeding? Yes    EXAM:   Physical Exam   Genitourinary: Rectum normal, vagina normal, uterus normal, cervix normal, right adnexa normal, left adnexa normal and vulva normal.   Genitourinary Comments: IUD placement:    After consent was obtained and timeout performed, the patient was positioned in dorsal lithotomy. Bimanual exam confirms an anteverted uterus. A speculum was inserted. The cervix was prepped with betadine. The cervix was grasped  on the anterior lip and the uterus sounded to 7 cm. The Mirena IUD was inserted to 7 cm without difficulty and easily deployed. The strings were trimmed to 2 cm. The cervix was released from the tenaculum.The IUD strings then visibly lengthened and the base of the IUD was visible at the cervical os. The IUD was grasped and removed for failed insertion. Her cramps subsided after IUD was removed.            ASSESSMENT/PLAN:    ICD-10-CM    1. Pre-procedure lab exam Z01.812 Urine pregnancy test (HCG), qualitative      Urine pregnancy test (HCG), qualitative   2. Unsuccessful IUD insertion Z53.8         Plan:  With failed IUD insertion discussed another trial of insertion which she declines. She wishes to continue on the mini-pill for now. F/u with signs of heavy bleeding or infection.

## 2018-02-28 ENCOUNTER — DOCUMENTATION ONLY (OUTPATIENT)
Dept: FAMILY MEDICINE | Facility: OTHER | Age: 23
End: 2018-02-28

## 2018-02-28 PROBLEM — J30.1 ALLERGIC RHINITIS DUE TO POLLEN: Status: ACTIVE | Noted: 2018-02-28

## 2018-02-28 PROBLEM — N71.0: Status: ACTIVE | Noted: 2017-05-16

## 2018-02-28 RX ORDER — PRENATAL VIT/IRON FUM/FOLIC AC 27MG-0.8MG
1 TABLET ORAL DAILY
COMMUNITY
Start: 2016-11-30 | End: 2018-08-02

## 2018-02-28 RX ORDER — EPINEPHRINE 0.3 MG/.3ML
0.3 INJECTION SUBCUTANEOUS
COMMUNITY
Start: 2015-06-12 | End: 2021-12-29

## 2018-02-28 RX ORDER — ACETAMINOPHEN AND CODEINE PHOSPHATE 120; 12 MG/5ML; MG/5ML
0.35 SOLUTION ORAL DAILY
COMMUNITY
Start: 2017-05-25 | End: 2018-03-20 | Stop reason: ALTCHOICE

## 2018-03-20 ENCOUNTER — TELEPHONE (OUTPATIENT)
Dept: FAMILY MEDICINE | Facility: OTHER | Age: 23
End: 2018-03-20

## 2018-03-20 DIAGNOSIS — Z30.45 ENCOUNTER FOR SURVEILLANCE OF TRANSDERMAL PATCH HORMONAL CONTRACEPTIVE DEVICE: Primary | ICD-10-CM

## 2018-03-20 RX ORDER — NORELGESTROMIN AND ETHINYL ESTRADIOL 35; 150 UG/MG; UG/MG
PATCH TRANSDERMAL
Qty: 9 PATCH | Refills: 4 | Status: SHIPPED | OUTPATIENT
Start: 2018-03-20 | End: 2018-07-06

## 2018-03-20 NOTE — TELEPHONE ENCOUNTER
"Patient is currently taking the \"mini-pill\" Micronor as she is still currently breastfeeding. However, she says that she does forget to take this, and would like to switch to the patch. She states she is aware that the patch may make her milk supply decrease, but she is fine with this. Can you send in or do you prefer that she comes in and sees you?   Radhika Hayward LPN...................3/20/2018   12:30 PM   "

## 2018-03-20 NOTE — TELEPHONE ENCOUNTER
Patient was notified about rx being sent in.   Radhika Hayward LPN....................  3/20/2018   1:03 PM

## 2018-07-05 ENCOUNTER — TELEPHONE (OUTPATIENT)
Dept: FAMILY MEDICINE | Facility: OTHER | Age: 23
End: 2018-07-05

## 2018-07-05 DIAGNOSIS — Z30.45 ENCOUNTER FOR SURVEILLANCE OF TRANSDERMAL PATCH HORMONAL CONTRACEPTIVE DEVICE: ICD-10-CM

## 2018-07-06 NOTE — TELEPHONE ENCOUNTER
"New Rx sent - notifying that she may skip placebo week.  But insurance may not cover all of her Rx for the year - would be okay to pay cash for any \"extra\" prescriptions.  Arpita Carrington     "

## 2018-07-06 NOTE — TELEPHONE ENCOUNTER
Called patient with results after giving last name and date of birth.  Jason Mclean ..............7/6/2018 1:31 PM

## 2018-07-09 ENCOUNTER — ALLIED HEALTH/NURSE VISIT (OUTPATIENT)
Dept: FAMILY MEDICINE | Facility: OTHER | Age: 23
End: 2018-07-09
Attending: FAMILY MEDICINE
Payer: COMMERCIAL

## 2018-07-09 DIAGNOSIS — Z11.1 SCREENING EXAMINATION FOR PULMONARY TUBERCULOSIS: Primary | ICD-10-CM

## 2018-07-09 PROCEDURE — 25000125 ZZHC RX 250: Performed by: FAMILY MEDICINE

## 2018-07-09 PROCEDURE — 96372 THER/PROPH/DIAG INJ SC/IM: CPT

## 2018-07-09 PROCEDURE — 99207 ZZC NO CHARGE NURSE ONLY: CPT

## 2018-07-09 RX ADMIN — TUBERCULIN PURIFIED PROTEIN DERIVATIVE 5 UNITS: 5 INJECTION INTRADERMAL at 13:36

## 2018-07-09 NOTE — NURSING NOTE
The patient is asked the following questions today and these are her answers:    -Have you had a mantoux administered in the past 30 days?    No  -Have you had a previous positive Mantoux.  No  -Have you received BCG in the past.  No  -Have you had a live vaccine  (MMR, Varicella, OPV, Yellow Fever) in the last 6 weeks.  No  -Have you had and active  viral or bacterial infection in the past 6 weeks.  No  -Have you received corticosteroids or immunosuppressive agents in the past 6 weeks.  No  -Have you been diagnosed with HIV?  No  -Do you have a maglinancy?  No    Patient here for Mantoux for school. Has appt to return in 48-72 hours. Tolerated well, left ambulatory Trupti Breaux RN on 7/9/2018 at 1:40 PM

## 2018-07-09 NOTE — MR AVS SNAPSHOT
After Visit Summary   7/9/2018    Nicky Hayes    MRN: [id number]           Patient Information     Date Of Birth          1995        Visit Information        Provider Department      7/9/2018 1:30 PM GH INJECTION NURSE [facility]        Today's Diagnoses     Screening examination for pulmonary tuberculosis    -  1       Follow-ups after your visit        Your next 10 appointments already scheduled     Jul 11, 2018  3:30 PM CDT   Nurse Only with GH INJECTION NURSE   [facility] ([facility])    [address]  [place] [address]   [phone]            Jul 24, 2018  1:15 PM CDT   Nurse Only with GH INJECTION NURSE   [facility] ([facility])    [address]  [place] [address]   [phone]            Jul 26, 2018  1:30 PM CDT   Nurse Only with GH INJECTION NURSE   [facility] ([facility])    [address]  [place] [facility] [address]   [phone]            Aug 02, 2018 10:15 AM CDT   Office Visit with Arpita Carrington DO   [facility] ([facility])    [address]  [place] [facility] [address]   [phone]           Bring a current list of meds and any records pertaining to this visit. For Physicals, please bring immunization records and any forms needing to be filled out. Please arrive 10 minutes early to complete paperwork.              Who to contact     If you have questions or need follow up information about today's clinic visit or your schedule please contact [facility] directly at [phone].  Normal or non-critical lab and imaging results will be communicated to you by MyChart, letter or phone within 4 business days after the clinic has received the results. If you do not hear from us within 7 days, please contact the clinic  "through [email or web address] or phone. If you have a critical or abnormal lab result, we will notify you by phone as soon as possible.  Submit refill requests through [email or web address] or call your pharmacy and they will forward the refill request to us. Please allow 3 business days for your refill to be completed.          Additional Information About Your Visit        Ph[employer]ation[employer]al Information     [email or web address] lets you send messages to your doctor, view your test results, renew your prescriptions, schedule appointments and more. To sign up, go to [email or web address] . Click on \"Log in\" on the left side of the screen, which will take you to the Welcome page. Then click on \"Sign up Now\" on the right side of the page.     You will be asked to enter the access code listed below, as well as some personal information. Please follow the directions to create your username and password.     Your access code is: [email or web address]  Expires: 10/7/2018  1:41 PM     Your access code will  in 90 days. If you need help or a new code, please call your [place] clinic or [phone].        Care EveryWhere ID     This is your Care EveryWhere ID. This could be used by other organizations to access your [place] medical records  [id number]         Blood Pressure from Last 3 Encounters:   18 112/68   18 112/62   17 122/68    Weight from Last 3 Encounters:   18 130 lb (59 kg)   18 130 lb (59 kg)   17 132 lb (59.9 kg)              Today, you had the following     No orders found for display       Primary Care Provider Office Phone # Fax #    Arpita Carrington [phone] [address] [facility] [address]        Equal Access to Services     RY LEBLANC : Dominick Paul, yana lynch, barbara kaalfabiana goldman. So [facility] [phone].    ATENCIÓN: Si habla español, tiene a mazariegos disposición servicios gratuitos de asistencia lingüística. " Tad slade [phone].    We comply with applicable federal civil rights laws and [place] laws. We do not discriminate on the basis of race, color, national origin, age, disability, sex, sexual orientation, or gender identity.            Thank you!     Thank you for choosing [facility] AND [facility]  for your care. Our goal is always to provide you with excellent care. Hearing back from our patients is one way we can continue to improve our services. Please take a few minutes to complete the written survey that you may receive in the mail after your visit with us. Thank you!             Your Updated Medication List - Protect others around you: Learn how to safely use, store and throw away your medicines at www.disposemymeds.org.          This list is accurate as of 7/9/18  1:41 PM.  Always use your most recent med list.                   Brand Name Dispense Instructions for use Diagnosis    albuterol 108 (90 Base) MCG/ACT Aepb inhaler    PROAIR RESPICLICK     Inhale 1 puff into the lungs every 4 hours (while awake)        EPINEPHrine 0.3 MG/0.3ML injection 2-pack    EPIPEN/ADRENACLICK/or ANY BX GENERIC EQUIV     Inject 0.3 mg into the muscle once as needed for allergic reaction for up to 1 dose.        norelgestromin-ethinyl estradiol 150-35 MCG/24HR patch    ORTHO EVRA    9 patch    Remove old patch and apply new patch onto the skin once a week for 3 weeks (21 days). Do not wear patch week 4 (days 22-28), then repeat.    Encounter for surveillance of transdermal patch hormonal contraceptive device       prenatal multivitamin plus iron 27-0.8 MG Tabs per tablet      Take 1 tablet by mouth daily

## 2018-07-11 ENCOUNTER — HEALTH MAINTENANCE LETTER (OUTPATIENT)
Age: 23
End: 2018-07-11

## 2018-07-11 ENCOUNTER — ALLIED HEALTH/NURSE VISIT (OUTPATIENT)
Dept: FAMILY MEDICINE | Facility: OTHER | Age: 23
End: 2018-07-11
Attending: FAMILY MEDICINE
Payer: COMMERCIAL

## 2018-07-11 DIAGNOSIS — Z11.1 SCREENING EXAMINATION FOR PULMONARY TUBERCULOSIS: Primary | ICD-10-CM

## 2018-07-11 PROCEDURE — 99207 ZZC NO CHARGE NURSE ONLY: CPT

## 2018-07-11 NOTE — NURSING NOTE
Mantoux results: No induration.  No swelling.  No redness.  Negative mantoux at 3:41 pm  Trupti Breaux RN on 7/11/2018 at 3:41 PM

## 2018-07-11 NOTE — MR AVS SNAPSHOT
After Visit Summary   7/11/2018    Nicky Hayes    MRN: [id number]           Patient Information     Date Of Birth          1995        Visit Information        Provider Department      7/11/2018 3:30 PM GH INJECTION NURSE [facility]        Today's Diagnoses     Screening examination for pulmonary tuberculosis    -  1       Follow-ups after your visit        Your next 10 appointments already scheduled     Jul 24, 2018  1:15 PM CDT   Nurse Only with GH INJECTION NURSE   [facility] ([facility])    [address]  [place] [address]   [phone]            Jul 26, 2018  1:30 PM CDT   Nurse Only with GH INJECTION NURSE   [facility] ([facility])    [address]  [place] [address]   [phone]            Aug 02, 2018 10:15 AM CDT   Office Visit with Arpita Carrington,    [facility] ([facility])    [address]  [place] [address]   [phone]           Bring a current list of meds and any records pertaining to this visit. For Physicals, please bring immunization records and any forms needing to be filled out. Please arrive 10 minutes early to complete paperwork.              Who to contact     If you have questions or need follow up information about today's clinic visit or your schedule please contact [facility] directly at [phone].  Normal or non-critical lab and imaging results will be communicated to you by MyChart, letter or phone within 4 business days after the clinic has received the results. If you do not hear from us within 7 days, please contact the clinic through MyChart or phone. If you have a critical or abnormal lab result, we will notify you by phone as soon as possible.  Submit refill requests through [employer] or call your pharmacy and they will forward  "the refill request to us. Please allow 3 business days for your refill to be completed.          Additional Information About Your Visit        MyChart Information     [employer] lets you send messages to your doctor, view your test results, renew your prescriptions, schedule appointments and more. To sign up, go to [email or web address] . Click on \"Log in\" on the left side of the screen, which will take you to the Welcome page. Then click on \"Sign up Now\" on the right side of the page.     You will be asked to enter the access code listed below, as well as some personal information. Please follow the directions to create your username and password.     Your access code is: [email or web address]  Expires: 10/7/2018  1:41 PM     Your access code will  in 90 days. If you need help or a new code, please call your [place] clinic or [phone].        Care EveryWhere ID     This is your Care EveryWhere ID. This could be used by other organizations to access your [place] medical records  [id number]         Blood Pressure from Last 3 Encounters:   18 112/68   18 112/62   17 122/68    Weight from Last 3 Encounters:   18 130 lb (59 kg)   18 130 lb (59 kg)   17 132 lb (59.9 kg)              Today, you had the following     No orders found for display       Primary Care Provider Office Phone # Fax #    Arpita Carrington [phone]       [address] [facility] [address]        Equal Access to Services     [facility]: Hadii aad ku hadasho Soomaali, waaxda luqadaha, qaybta kaalmada fabiana garcia . So [facility] [phone].    ATENCIÓN: Si habla español, tiene a mazariegos disposición servicios gratuitos de asistencia lingüística. Llame al [phone].    We comply with applicable federal civil rights laws and [place] laws. We do not discriminate on the basis of race, color, national origin, age, disability, sex, sexual orientation, " or gender identity.            Thank you!     Thank you for choosing [facility] AND [facility]  for your care. Our goal is always to provide you with excellent care. Hearing back from our patients is one way we can continue to improve our services. Please take a few minutes to complete the written survey that you may receive in the mail after your visit with us. Thank you!             Your Updated Medication List - Protect others around you: Learn how to safely use, store and throw away your medicines at www.disposemymeds.org.          This list is accurate as of 7/11/18  3:52 PM.  Always use your most recent med list.                   Brand Name Dispense Instructions for use Diagnosis    albuterol 108 (90 Base) MCG/ACT Aepb inhaler    PROAIR RESPICLICK     Inhale 1 puff into the lungs every 4 hours (while awake)        EPINEPHrine 0.3 MG/0.3ML injection 2-pack    EPIPEN/ADRENACLICK/or ANY BX GENERIC EQUIV     Inject 0.3 mg into the muscle once as needed for allergic reaction for up to 1 dose.        norelgestromin-ethinyl estradiol 150-35 MCG/24HR patch    ORTHO EVRA    9 patch    Remove old patch and apply new patch onto the skin once a week for 3 weeks (21 days). Do not wear patch week 4 (days 22-28), then repeat.    Encounter for surveillance of transdermal patch hormonal contraceptive device       prenatal multivitamin plus iron 27-0.8 MG Tabs per tablet      Take 1 tablet by mouth daily

## 2018-07-12 RX ORDER — NORELGESTROMIN AND ETHINYL ESTRADIOL 35; 150 UG/MG; UG/MG
PATCH TRANSDERMAL
Qty: 9 PATCH | Refills: 5 | Status: SHIPPED | OUTPATIENT
Start: 2018-07-12 | End: 2018-08-02

## 2018-07-12 NOTE — TELEPHONE ENCOUNTER
Patient called looking for prescription that she was called and notified of last week. Please see Dr. Chambers's note below. It appears, she may have forgot to sign the prescription. Please advise    Ai Barth LPN on 7/12/2018 at 2:09 PM

## 2018-07-16 ENCOUNTER — ALLIED HEALTH/NURSE VISIT (OUTPATIENT)
Dept: FAMILY MEDICINE | Facility: OTHER | Age: 23
End: 2018-07-16
Attending: FAMILY MEDICINE
Payer: COMMERCIAL

## 2018-07-16 DIAGNOSIS — Z11.1 SCREENING EXAMINATION FOR PULMONARY TUBERCULOSIS: Primary | ICD-10-CM

## 2018-07-16 PROCEDURE — 86580 TB INTRADERMAL TEST: CPT

## 2018-07-16 NOTE — PROGRESS NOTES
The patient is asked the following questions today and these are her answers:    -Have you had a mantoux administered in the past 30 days?    Yes  -Have you had a previous positive Mantoux.  No  -Have you received BCG in the past.  No  -Have you had a live vaccine  (MMR, Varicella, OPV, Yellow Fever) in the last 6 weeks.  No  -Have you had and active  viral or bacterial infection in the past 6 weeks.  No  -Have you received corticosteroids or immunosuppressive agents in the past 6 weeks.  No  -Have you been diagnosed with HIV?  No  -Do you have a maglinancy?  No     Nany Acevedo RN on 7/16/2018 at 9:17 AM

## 2018-07-16 NOTE — MR AVS SNAPSHOT
After Visit Summary   7/16/2018    Nicky Hayes    MRN: [id number]           Patient Information     Date Of Birth          1995        Visit Information        Provider Department      7/16/2018 8:45 AM  INJECTION NURSE [facility]        Today's Diagnoses     Screening examination for pulmonary tuberculosis    -  1       Follow-ups after your visit        Your next 10 appointments already scheduled     Jul 18, 2018  9:30 AM CDT   Nurse Only with  INJECTION NURSE   [facility]    [address]  [place] [address]   [phone]            Aug 02, 2018 10:15 AM CDT   Office Visit with Arpita Carrington,    [facility] ([facility])    [address]  [place] [address] [phone]           Bring a current list of meds and any records pertaining to this visit. For Physicals, please bring immunization records and any forms needing to be filled out. Please arrive 10 minutes early to complete paperwork.              Who to contact     If you have questions or need follow up information about today's clinic visit or your schedule please contact [facility] directly at [phone].  Normal or non-critical lab and imaging results will be communicated to you by SecretBuildershart, letter or phone within 4 business days after the clinic has received the results. If you do not hear from us within 7 days, please contact the clinic through [employer] or phone. If you have a critical or abnormal lab result, we will notify you by phone as soon as possible.  Submit refill requests through bookjam or call your pharmacy and they will forward the refill request to us. Please allow 3 business days for your refill to be completed.          Additional Information About Your Visit        bookjam Information     bookjam lets you send messages to your  "doctor, view your test results, renew your prescriptions, schedule appointments and more. To sign up, go to [email or web address] . Click on \"Log in\" on the left side of the screen, which will take you to the Welcome page. Then click on \"Sign up Now\" on the right side of the page.     You will be asked to enter the access code listed below, as well as some personal information. Please follow the directions to create your username and password.     Your access code is: [email or web address]  Expires: 10/7/2018  1:41 PM     Your access code will  in 90 days. If you need help or a new code, please call your [place] clinic or [phone].        Care EveryWhere ID     This is your Care EveryWhere ID. This could be used by other organizations to access your [place] medical records  [id number]         Blood Pressure from Last 3 Encounters:   18 112/68   18 112/62   17 122/68    Weight from Last 3 Encounters:   18 130 lb (59 kg)   18 130 lb (59 kg)   17 132 lb (59.9 kg)              We Performed the Following     [facility]        Primary Care Provider Office Phone # Fax #    Arpita MELENDEZ DO [place] [phone] [address] [facility] [address]        Equal Access to Services     [facility]: Hadii aad ku hadasho Soomaali, waaxda luqadaha, qaybta kaalmada adeegyada, fabiana grey . So [facility] [phone].    ATENCIÓN: Si kalla qamarañol, tiene a mazariegos disposición servicios gratuitos de asistencia lingüística. Llame al [phone].    We comply with applicable federal civil rights laws and [place] laws. We do not discriminate on the basis of race, color, national origin, age, disability, sex, sexual orientation, or gender identity.            Thank you!     Thank you for choosing [facility] AND \[facility]\""  for your care. Our goal is always to provide you with excellent care. Hearing back from our patients is one way we " can continue to improve our services. Please take a few minutes to complete the written survey that you may receive in the mail after your visit with us. Thank you!             Your Updated Medication List - Protect others around you: Learn how to safely use, store and throw away your medicines at www.disposemymeds.org.          This list is accurate as of 7/16/18  9:23 AM.  Always use your most recent med list.                   Brand Name Dispense Instructions for use Diagnosis    albuterol 108 (90 Base) MCG/ACT Aepb inhaler    PROAIR RESPICLICK     Inhale 1 puff into the lungs every 4 hours (while awake)        EPINEPHrine 0.3 MG/0.3ML injection 2-pack    EPIPEN/ADRENACLICK/or ANY BX GENERIC EQUIV     Inject 0.3 mg into the muscle once as needed for allergic reaction for up to 1 dose.        norelgestromin-ethinyl estradiol 150-35 MCG/24HR patch    ORTHO EVRA    9 patch    Remove old patch and apply new patch onto the skin once a week for 3 weeks (21 days). Do not wear patch week 4 (days 22-28), then repeat.  Okay to skip placebo week intermittently.    Encounter for surveillance of transdermal patch hormonal contraceptive device       prenatal multivitamin plus iron 27-0.8 MG Tabs per tablet      Take 1 tablet by mouth daily

## 2018-07-18 ENCOUNTER — ALLIED HEALTH/NURSE VISIT (OUTPATIENT)
Dept: FAMILY MEDICINE | Facility: OTHER | Age: 23
End: 2018-07-18
Attending: FAMILY MEDICINE
Payer: COMMERCIAL

## 2018-07-18 DIAGNOSIS — Z11.1 ENCOUNTER FOR READING OF TUBERCULIN SKIN TEST: Primary | ICD-10-CM

## 2018-07-18 LAB
PPDINDURATION: NORMAL MM (ref 0–5)
PPDREDNESS: NORMAL MM

## 2018-07-18 PROCEDURE — 99207 ZZC NO CHARGE NURSE ONLY: CPT

## 2018-07-23 NOTE — PROGRESS NOTES
Patient Information     Patient Name  Nicky Hayes MRN  [id number] Sex  Female   1995      Letter by Arpita Quezada DO at      Author:  Arpita Quezada DO Service:  (none) Author Type:  (none)    Filed:   Encounter Date:  3/30/2017 Status:  (Other)           Nicky Hayes  [address] [facility] [address]          2017    To whom it may concern:    Please allow Nicky to begin her leave of absence for maternity leave as of 3/29/2017 due to worsening of end of pregnancy symptoms.    If you have any questions or concerns, feel free to contact me at the above information.    Sincerely,            ARPITA QUEZADA DO

## 2018-08-02 ENCOUNTER — OFFICE VISIT (OUTPATIENT)
Dept: FAMILY MEDICINE | Facility: OTHER | Age: 23
End: 2018-08-02
Attending: FAMILY MEDICINE
Payer: COMMERCIAL

## 2018-08-02 VITALS
DIASTOLIC BLOOD PRESSURE: 64 MMHG | SYSTOLIC BLOOD PRESSURE: 112 MMHG | WEIGHT: 139.6 LBS | BODY MASS INDEX: 23.23 KG/M2 | HEART RATE: 76 BPM

## 2018-08-02 DIAGNOSIS — Z11.8 SPECIAL SCREENING EXAMINATION FOR CHLAMYDIAL DISEASE: ICD-10-CM

## 2018-08-02 DIAGNOSIS — Z30.45 ENCOUNTER FOR SURVEILLANCE OF TRANSDERMAL PATCH HORMONAL CONTRACEPTIVE DEVICE: ICD-10-CM

## 2018-08-02 DIAGNOSIS — Z12.4 CERVICAL CANCER SCREENING: Primary | ICD-10-CM

## 2018-08-02 LAB
C TRACH DNA SPEC QL PROBE+SIG AMP: NOT DETECTED
N GONORRHOEA DNA SPEC QL PROBE+SIG AMP: NOT DETECTED
SPECIMEN SOURCE: NORMAL

## 2018-08-02 PROCEDURE — 88142 CYTOPATH C/V THIN LAYER: CPT | Mod: ZL | Performed by: FAMILY MEDICINE

## 2018-08-02 PROCEDURE — G0123 SCREEN CERV/VAG THIN LAYER: HCPCS | Performed by: FAMILY MEDICINE

## 2018-08-02 PROCEDURE — 87591 N.GONORRHOEAE DNA AMP PROB: CPT | Performed by: FAMILY MEDICINE

## 2018-08-02 PROCEDURE — 99212 OFFICE O/P EST SF 10 MIN: CPT | Performed by: FAMILY MEDICINE

## 2018-08-02 PROCEDURE — 87491 CHLMYD TRACH DNA AMP PROBE: CPT | Performed by: FAMILY MEDICINE

## 2018-08-02 RX ORDER — NORELGESTROMIN AND ETHINYL ESTRADIOL 35; 150 UG/MG; UG/MG
PATCH TRANSDERMAL
Qty: 11 PATCH | Refills: 5 | Status: SHIPPED | OUTPATIENT
Start: 2018-08-02 | End: 2019-01-22

## 2018-08-02 NOTE — PROGRESS NOTES
SUBJECTIVE:   Nicky Hayes is a 23 year old female who presents to clinic today for the following health issues:    HPI  Nicky is here for pap smear; no other concerns.  Has new sexual partner since the delivery of her child (>1 year ago).  Plans to move in with him next week.  Using the birth control patch for contraception and is compliant.  Will occasionally use patch continuously and skip her period which helps with her dysmenorrhea.    Patient Active Problem List    Diagnosis Date Noted     Allergic rhinitis due to pollen 2018     Priority: Medium     Overview:   Seasonal allergy symptoms.       Subacute endomyometritis 2017     Priority: Medium     Depression with anxiety 2013     Priority: Medium     Allergic state 2011     Priority: Medium     Dysmenorrhea 06/10/2011     Priority: Medium     Idiopathic scoliosis 06/10/2011     Priority: Medium     Past Medical History:   Diagnosis Date     Chronic cough 2002    resolved     Closed fracture of lower end of radius 2006    L arm     History of early menarche 2007     Pregnancy           Past Surgical History:   Procedure Laterality Date     DILATION AND CURETTAGE N/A 2017    Dr. Hazel; [facility] - retained placenta/endometritis     History reviewed. No pertinent family history.  Social History   Substance Use Topics     Smoking status: Never Smoker     Smokeless tobacco: Never Used     Alcohol use 0.0 oz/week      Comment: Alcoholic Drinks/day: rare     Social History     Social History Narrative    Attends [employer]-senior fall .     Doni Mathew-stepfather. Now  from her mother and out of the home.    Minimal contact with her biological father in [place].     Kiley Hayes Mother    Only child    Worked at [employer] in [employer]; now at [employer].         Current Outpatient Prescriptions   Medication Sig Dispense Refill     norelgestromin-ethinyl estradiol (ORTHO EVRA) 150-35 MCG/24HR patch  Remove old patch and apply new patch onto the skin 1x/week for 11 wks. Do not wear patch 12 wks, then repeat.  Skip placebo week q3 months. 11 patch 5     EPINEPHrine (EPIPEN/ADRENACLICK/OR ANY BX GENERIC EQUIV) 0.3 MG/0.3ML injection 2-pack Inject 0.3 mg into the muscle once as needed for allergic reaction for up to 1 dose.       [DISCONTINUED] norelgestromin-ethinyl estradiol (ORTHO EVRA) 150-35 MCG/24HR patch Remove old patch and apply new patch onto the skin once a week for 3 weeks (21 days). Do not wear patch week 4 (days 22-28), then repeat.  Okay to skip placebo week intermittently. 9 patch 5     Allergies   Allergen Reactions     Bermuda Grass Itching     Dust Mite Extract Itching     Mold Itching     Pollen Extract Itching     Tree Nuts  [Nuts] Itching       Review of Systems   All other systems reviewed and are negative.       OBJECTIVE:     /64 (BP Location: Right arm, Patient Position: Sitting, Cuff Size: Adult Regular)  Pulse 76  Wt 139 lb 9.6 oz (63.3 kg)  BMI 23.23 kg/m2  Body mass index is 23.23 kg/(m^2).  Physical Exam   Constitutional: She appears well-developed and well-nourished.   HENT:   Head: Normocephalic and atraumatic.   Eyes: EOM are normal. Pupils are equal, round, and reactive to light.   Neck: Normal range of motion. Neck supple.   Cardiovascular: Normal rate and normal heart sounds.    Pulmonary/Chest: Effort normal and breath sounds normal.   Abdominal: Soft. Bowel sounds are normal.   Genitourinary: Vagina normal. Pelvic exam was performed with patient prone. Cervix exhibits no motion tenderness, no discharge and no friability. Right adnexum displays no mass, no tenderness and no fullness. Left adnexum displays no mass, no tenderness and no fullness.   Psychiatric: She has a normal mood and affect. Judgment normal.   Nursing note and vitals reviewed.      Diagnostic Test Results:  Results for orders placed or performed in visit on 08/02/18 (from the past 24 hour(s))    GC/Chlamydia by PCR   Result Value Ref Range    Specimen Source Vagina     Neisseria gonorrhoreae PCR Not Detected NDET^Not Detected    Chlamydia Trachomatis PCR Not Detected NDET^Not Detected     Pap smear pending.    ASSESSMENT/PLAN:     1. Encounter for surveillance of transdermal patch hormonal contraceptive device  Refilled birth control patches at current dose; clarified ability to skip periods prn.  - norelgestromin-ethinyl estradiol (ORTHO EVRA) 150-35 MCG/24HR patch; Remove old patch and apply new patch onto the skin 1x/week for 11 wks. Do not wear patch 12 wks, then repeat.  Skip placebo week q3 months.  Dispense: 11 patch; Refill: 5    2. Cervical cancer screening  Pap smear collected; will notify when results available.  - Pap Screen Thin Prep only - recommended age 21 - 24 years    3. Special screening examination for chlamydial disease  GC CT tests - negative.  - GC/Chlamydia by PCR      Arpita Carrington, [facility]

## 2018-08-02 NOTE — MR AVS SNAPSHOT
"              After Visit Summary   2018    Nicky Hayes    MRN: [id number]           Patient Information     Date Of Birth          1995        Visit Information        Provider Department      2018 10:15 AM Arpita Carrington DO [facility]        Today's Diagnoses     Cervical cancer screening    -  1    Encounter for surveillance of transdermal patch hormonal contraceptive device        Special screening examination for chlamydial disease           Follow-ups after your visit        Who to contact     If you have questions or need follow up information about today's clinic visit or your schedule please contact [facility] directly at [phone].  Normal or non-critical lab and imaging results will be communicated to you by [employer] Photonicshart, letter or phone within 4 business days after the clinic has received the results. If you do not hear from us within 7 days, please contact the clinic through [employer] Photon[employer]hart or phone. If you have a critical or abnormal lab result, we will notify you by phone as soon as possible.  Submit refill requests through Everypost or call your pharmacy and they will forward the refill request to us. Please allow 3 business days for your refill to be completed.          Additional Information About Your Visit        MyChart Information     Everypost lets you send messages to your doctor, view your test results, renew your prescriptions, schedule appointments and more. To sign up, go to [email or web address] . Click on \"Log in\" on the left side of the screen, which will take you to the Welcome page. Then click on \"Sign up Now\" on the right side of the page.     You will be asked to enter the access code listed below, as well as some personal information. Please follow the directions to create your username and password.     Your access code is: [email or web address]  Expires: 10/31/2018 10:26 AM     Your access code will  in 90 days. If you need help " or a new code, please call your [place] clinic or [phone].        Care EveryWhere ID     This is your Care EveryWhere ID. This could be used by other organizations to access your [place] medical records  [id number]        Your Vitals Were     Pulse BMI (Body Mass Index)                76 23.23 kg/m2           Blood Pressure from Last 3 Encounters:   08/02/18 112/64   01/26/18 112/68   01/12/18 112/62    Weight from Last 3 Encounters:   08/02/18 139 lb 9.6 oz (63.3 kg)   01/26/18 130 lb (59 kg)   01/12/18 130 lb (59 kg)              We Performed the Following     GC/Chlamydia by PCR     Pap Screen Thin Prep only - recommended age 21 - 24 years          Today's Medication Changes          These changes are accurate as of 8/2/18  6:26 PM.  If you have any questions, ask your nurse or doctor.               These medicines have changed or have updated prescriptions.        Dose/Directions    norelgestromin-ethinyl estradiol 150-35 MCG/24HR patch   Commonly known as:  ORTHO EVRA   This may have changed:  additional instructions   Used for:  Encounter for surveillance of transdermal patch hormonal contraceptive device   Changed by:  Arpita Carrington, DO        Remove old patch and apply new patch onto the skin 1x/week for 11 wks. Do not wear patch 12 wks, then repeat.  Skip placebo week q3 months.   Quantity:  11 patch   Refills:  5            Where to get your medicines      These medications were sent to [facility] [address] [facility] - [place], [place] - [address]  [address] [facility] [address]     Phone:  [phone]     norelgestromin-ethinyl estradiol 150-35 MCG/24HR patch                Primary Care Provider Office Phone # Fax #    Arpita Carrington [phone]       [address] [facility] [address]        Equal Access to Services     [facility]BLANC AH: Dominick humphreys Soomaali, waaxda luqadaha, qaybta kaalmada fabiana garcia  ah. So [facility] [phone].    ATENCIÓN: Si bertram sosa, tiene a mazariegos disposición servicios gratuitos de asistencia lingüística. Tad al [phone].    We comply with applicable federal civil rights laws and [place] laws. We do not discriminate on the basis of race, color, national origin, age, disability, sex, sexual orientation, or gender identity.            Thank you!     Thank you for choosing [facility] [place] [facility]  for your care. Our goal is always to provide you with excellent care. Hearing back from our patients is one way we can continue to improve our services. Please take a few minutes to complete the written survey that you may receive in the mail after your visit with us. Thank you!             Your Updated Medication List - Protect others around you: Learn how to safely use, store and throw away your medicines at www.disposemymeds.org.          This list is accurate as of 8/2/18  6:26 PM.  Always use your most recent med list.                   Brand Name Dispense Instructions for use Diagnosis    EPINEPHrine 0.3 MG/0.3ML injection 2-pack    EPIPEN/ADRENACLICK/or ANY BX GENERIC EQUIV     Inject 0.3 mg into the muscle once as needed for allergic reaction for up to 1 dose.        norelgestromin-ethinyl estradiol 150-35 MCG/24HR patch    ORTHO EVRA    11 patch    Remove old patch and apply new patch onto the skin 1x/week for 11 wks. Do not wear patch 12 wks, then repeat.  Skip placebo week q3 months.    Encounter for surveillance of transdermal patch hormonal contraceptive device

## 2018-08-24 ENCOUNTER — HOSPITAL ENCOUNTER (OUTPATIENT)
Dept: GENERAL RADIOLOGY | Facility: OTHER | Age: 23
Discharge: HOME | End: 2018-08-24
Attending: NURSE PRACTITIONER | Admitting: NURSE PRACTITIONER
Payer: COMMERCIAL

## 2018-08-24 ENCOUNTER — OFFICE VISIT (OUTPATIENT)
Dept: FAMILY MEDICINE | Facility: OTHER | Age: 23
End: 2018-08-24
Attending: NURSE PRACTITIONER
Payer: COMMERCIAL

## 2018-08-24 VITALS
WEIGHT: 141.31 LBS | HEART RATE: 80 BPM | HEIGHT: 65 IN | BODY MASS INDEX: 23.54 KG/M2 | TEMPERATURE: 98.2 F | DIASTOLIC BLOOD PRESSURE: 58 MMHG | SYSTOLIC BLOOD PRESSURE: 92 MMHG

## 2018-08-24 DIAGNOSIS — M79.672 LEFT FOOT PAIN: Primary | ICD-10-CM

## 2018-08-24 DIAGNOSIS — S90.32XA CONTUSION OF LEFT FOOT, INITIAL ENCOUNTER: ICD-10-CM

## 2018-08-24 PROCEDURE — 73630 X-RAY EXAM OF FOOT: CPT | Mod: LT

## 2018-08-24 PROCEDURE — 99213 OFFICE O/P EST LOW 20 MIN: CPT | Performed by: NURSE PRACTITIONER

## 2018-08-24 ASSESSMENT — PAIN SCALES - GENERAL: PAINLEVEL: SEVERE PAIN (6)

## 2018-08-24 NOTE — NURSING NOTE
Patient presents to the clinic for left foot injury that happened this morning. Patient states she landed wrong while going down the stairs. She has taken ibuprofen for relief.  Treva CAMERON CMA.......8/24/2018..1:15 PM

## 2018-08-24 NOTE — PROGRESS NOTES
"Nursing Notes:   Treva Bledsoe CMA  8/24/2018  2:03 PM  Signed  Patient presents to the clinic for left foot injury that happened this morning. Patient states she landed wrong while going down the stairs. She has taken ibuprofen for relief.  Treva CAMERON CMA.......8/24/2018..1:15 PM      SUBJECTIVE:   Nicky Hayes is a 23 year old female who presents to clinic today for the following health issues:    Musculoskeletal problem/pain      Duration: DOI 8/24/18    Description  Location: LT foot, lateral side    Intensity:  moderate    Accompanying signs and symptoms: swelling and pain with walking.     History  Previous similar problem: no   Previous evaluation:  none    Precipitating or alleviating factors:  Trauma or overuse: YES- Twisted foot while going down stairs.   Aggravating factors include: standing, walking, climbing stairs and lifting    Therapies tried and outcome: nothing        Problem list and histories reviewed & adjusted, as indicated.  Additional history: as documented    Current Outpatient Prescriptions   Medication Sig Dispense Refill     EPINEPHrine (EPIPEN/ADRENACLICK/OR ANY BX GENERIC EQUIV) 0.3 MG/0.3ML injection 2-pack Inject 0.3 mg into the muscle once as needed for allergic reaction for up to 1 dose.       norelgestromin-ethinyl estradiol (ORTHO EVRA) 150-35 MCG/24HR patch Remove old patch and apply new patch onto the skin 1x/week for 11 wks. Do not wear patch 12 wks, then repeat.  Skip placebo week q3 months. 11 patch 5     Allergies   Allergen Reactions     Bermuda Grass Itching     Dust Mite Extract Itching     Mold Itching     Pollen Extract Itching     Tree Nuts  [Nuts] Itching         ROS:  Notable findings in the HPI.       OBJECTIVE:     BP 92/58 (BP Location: Left arm, Patient Position: Sitting, Cuff Size: Adult Regular)  Pulse 80  Temp 98.2  F (36.8  C) (Tympanic)  Ht 5' 5\" (1.651 m)  Wt 141 lb 5 oz (64.1 kg)  Breastfeeding? No  BMI 23.52 kg/m2  Body mass index is 23.52 " kg/(m^2).  GENERAL: healthy, alert and no distress  EYES: Eyes grossly normal to inspection  RESP: without increased work of breathing.   MS: Examination of the feet reveals normal DP pulse, normal sensory exam and ROM of toes intact, Able to flex and extend foot. Mild swelling and bruising around the 5th toe and pain over the 5th metatarsal.   SKIN: no suspicious lesions or rashes    Diagnostic Test Results:  Results for orders placed or performed in visit on 08/24/18 (from the past 24 hour(s))   XR Foot Left G/E 3 Views    Narrative    EXAM:XR FOOT LT G/E 3 VW     CLINICAL HISTORY: Patient Age  23 years Additional clinical info: Hurt  over 5th metatarsal; Left foot pain     COMPARISON: None      TECHNIQUE: 3 views      Impression    IMPRESSION: Normal left foot.    SHAILA STOCKTON MD     Completed Foot xray.  I personally reviewed the xray. there was no acute fracture upon initial read of xray.  Final read pending by radiology.    ASSESSMENT/PLAN:     1. Left foot pain  - XR Foot Left G/E 3 Views    2. Contusion of left foot, initial encounter      PLAN:    MS Injury/Pain  ice, heat, elevate, rest, Tylenol, Ibuprofen and Good fitting, firm soled shoes until pain resolves. F/U if needed.     Followup:    If not improving or if condition worsens, follow up with your Primary Care Provider    I explained my diagnostic considerations and recommendations to the patient, who voiced understanding and agreement with the treatment plan. All questions were answered. We discussed potential side effects of any prescribed or recommended therapies, as well as expectations for response to treatments. She was advised to contact PCP office if there is no improvement or worsening of conditions or symptoms.  If s/s worsen or persist, patient will either come back or follow up with PCP.    Disclaimer:  This note consists of words and symbols derived from keyboarding, dictation, or using voice recognition software. As a result, there  may be errors in the script that have gone undetected. Please consider this when interpreting information found in this note.      Maria Antonia Jean NP, 8/24/2018 1:35 PM

## 2018-08-24 NOTE — MR AVS SNAPSHOT
After Visit Summary   8/24/2018    Nicky Hayes    MRN: [id number]           Patient Information     Date Of Birth          1995        Visit Information        Provider Department      8/24/2018 1:00 PM Maria Antonia Jean NP [facility]        Today's Diagnoses     Left foot pain    -  1      Care Instructions      Foot Contusion  You have a contusion. This is also called a bruise. There is swelling and some bleeding under the skin, but no broken bones. This injury generally takes a few days to a few weeks to heal.  During that time, the bruise will typically change in color from reddish, to purple-blue, to greenish-yellow, then to yellow-brown.  Home care    Elevate the foot to reduce pain and swelling. As much as possible, sit or lie down with the foot raised about the level of your heart. This is especially important during the first 48 hours.    Ice the foot to help reduce pain and swelling. Wrap a cold source (ice pack or ice cubes in a plastic bag) in a thin towel. Apply to the bruised area for 20 minutes every 1 to 2 hours the first day. Continue this 3 to 4 times a day until the pain and swelling goes away.    Unless another medicine was prescribed, you can take acetaminophen, ibuprofen, or naproxen to control pain. (If you have chronic liver or kidney disease or ever had a stomach ulcer or gastrointestinal bleeding, talk with your healthcare provider before using these medicines.)  Follow up  Follow up with your healthcare provider or our staff as advised. Call if you are not improving within 1 to 2 weeks.  When to seek medical advice   Call your healthcare provider right away if you have any of the following:    Increased pain or swelling    Foot or leg becomes cold, blue, numb or tingly    Signs of infection: Warmth, drainage, or increased redness or pain around the bruise    Inability to move the injured foot     Frequent bruising for unknown  "reasons                  Follow-ups after your visit        Follow-up notes from your care team     Return if symptoms worsen or fail to improve.      Who to contact     If you have questions or need follow up information about today's clinic visit or your schedule please contact [facility] directly at [phone].  Normal or non-critical lab and imaging results will be communicated to you by MyChart, letter or phone within 4 business days after the clinic has received the results. If you do not hear from us within 7 days, please contact the clinic through Keclonhart or phone. If you have a critical or abnormal lab result, we will notify you by phone as soon as possible.  Submit refill requests through Canara or call your pharmacy and they will forward the refill request to us. Please allow 3 business days for your refill to be completed.          Additional Information About Your Visit        MyChart Information     Canara gives you secure access to your electronic health record. If you see a primary care provider, you can also send messages to your care team and make appointments. If you have questions, please call your primary care clinic.  If you do not have a primary care provider, please call [phone] and they will assist you.        Care EveryWhere ID     This is your Care EveryWhere ID. This could be used by other organizations to access your [place] medical records  [id number]        Your Vitals Were     Pulse Temperature Height Breastfeeding? BMI (Body Mass Index)       80 98.2  F (36.8  C) (Tympanic) 5' 5\" (1.651 m) No 23.52 kg/m2        Blood Pressure from Last 3 Encounters:   08/24/18 92/58   08/02/18 112/64   01/26/18 112/68    Weight from Last 3 Encounters:   08/24/18 141 lb 5 oz (64.1 kg)   08/02/18 139 lb 9.6 oz (63.3 kg)   01/26/18 130 lb (59 kg)              We Performed the Following     XR Foot Left G/E 3 Views        Primary Care Provider Office Phone # Fax #    " Arpita Carrington,  [phone]       [address] [facility] [address]        Equal Access to Services     RY LEBLANC : Hadii aad ku hadneena Paul, yana blaiseed, barbara kablanche garcia, fabiana moise giovannayunier pierre que wallace. So [facility] [phone].    ATENCIÓN: Si habla español, tiene a mazariegos disposición servicios gratuitos de asistencia lingüística. Llame al [phone].    We comply with applicable federal civil rights laws and [place] laws. We do not discriminate on the basis of race, color, national origin, age, disability, sex, sexual orientation, or gender identity.            Thank you!     Thank you for choosing [facility] AND [facility]  for your care. Our goal is always to provide you with excellent care. Hearing back from our patients is one way we can continue to improve our services. Please take a few minutes to complete the written survey that you may receive in the mail after your visit with us. Thank you!             Your Updated Medication List - Protect others around you: Learn how to safely use, store and throw away your medicines at www.disposemymeds.org.          This list is accurate as of 8/24/18  2:40 PM.  Always use your most recent med list.                   Brand Name Dispense Instructions for use Diagnosis    EPINEPHrine 0.3 MG/0.3ML injection 2-pack    EPIPEN/ADRENACLICK/or ANY BX GENERIC EQUIV     Inject 0.3 mg into the muscle once as needed for allergic reaction for up to 1 dose.        norelgestromin-ethinyl estradiol 150-35 MCG/24HR patch    ORTHO EVRA    11 patch    Remove old patch and apply new patch onto the skin 1x/week for 11 wks. Do not wear patch 12 wks, then repeat.  Skip placebo week q3 months.    Encounter for surveillance of transdermal patch hormonal contraceptive device

## 2018-08-24 NOTE — PATIENT INSTRUCTIONS
Foot Contusion  You have a contusion. This is also called a bruise. There is swelling and some bleeding under the skin, but no broken bones. This injury generally takes a few days to a few weeks to heal.  During that time, the bruise will typically change in color from reddish, to purple-blue, to greenish-yellow, then to yellow-brown.  Home care    Elevate the foot to reduce pain and swelling. As much as possible, sit or lie down with the foot raised about the level of your heart. This is especially important during the first 48 hours.    Ice the foot to help reduce pain and swelling. Wrap a cold source (ice pack or ice cubes in a plastic bag) in a thin towel. Apply to the bruised area for 20 minutes every 1 to 2 hours the first day. Continue this 3 to 4 times a day until the pain and swelling goes away.    Unless another medicine was prescribed, you can take acetaminophen, ibuprofen, or naproxen to control pain. (If you have chronic liver or kidney disease or ever had a stomach ulcer or gastrointestinal bleeding, talk with your healthcare provider before using these medicines.)  Follow up  Follow up with your healthcare provider or our staff as advised. Call if you are not improving within 1 to 2 weeks.  When to seek medical advice   Call your healthcare provider right away if you have any of the following:    Increased pain or swelling    Foot or leg becomes cold, blue, numb or tingly    Signs of infection: Warmth, drainage, or increased redness or pain around the bruise    Inability to move the injured foot     Frequent bruising for unknown reasons

## 2018-12-03 ENCOUNTER — OFFICE VISIT (OUTPATIENT)
Dept: FAMILY MEDICINE | Facility: OTHER | Age: 23
End: 2018-12-03
Attending: NURSE PRACTITIONER
Payer: COMMERCIAL

## 2018-12-03 VITALS
SYSTOLIC BLOOD PRESSURE: 108 MMHG | WEIGHT: 145.13 LBS | TEMPERATURE: 97.8 F | HEART RATE: 70 BPM | HEIGHT: 65 IN | BODY MASS INDEX: 24.18 KG/M2 | DIASTOLIC BLOOD PRESSURE: 70 MMHG

## 2018-12-03 DIAGNOSIS — R19.7 DIARRHEA, UNSPECIFIED TYPE: Primary | ICD-10-CM

## 2018-12-03 PROCEDURE — 99213 OFFICE O/P EST LOW 20 MIN: CPT | Performed by: NURSE PRACTITIONER

## 2018-12-03 ASSESSMENT — PATIENT HEALTH QUESTIONNAIRE - PHQ9
SUM OF ALL RESPONSES TO PHQ QUESTIONS 1-9: 1
5. POOR APPETITE OR OVEREATING: SEVERAL DAYS

## 2018-12-03 ASSESSMENT — ANXIETY QUESTIONNAIRES
3. WORRYING TOO MUCH ABOUT DIFFERENT THINGS: MORE THAN HALF THE DAYS
7. FEELING AFRAID AS IF SOMETHING AWFUL MIGHT HAPPEN: SEVERAL DAYS
GAD7 TOTAL SCORE: 8
5. BEING SO RESTLESS THAT IT IS HARD TO SIT STILL: NOT AT ALL
1. FEELING NERVOUS, ANXIOUS, OR ON EDGE: MORE THAN HALF THE DAYS
2. NOT BEING ABLE TO STOP OR CONTROL WORRYING: MORE THAN HALF THE DAYS
6. BECOMING EASILY ANNOYED OR IRRITABLE: NOT AT ALL
IF YOU CHECKED OFF ANY PROBLEMS ON THIS QUESTIONNAIRE, HOW DIFFICULT HAVE THESE PROBLEMS MADE IT FOR YOU TO DO YOUR WORK, TAKE CARE OF THINGS AT HOME, OR GET ALONG WITH OTHER PEOPLE: SOMEWHAT DIFFICULT

## 2018-12-03 ASSESSMENT — PAIN SCALES - GENERAL: PAINLEVEL: MILD PAIN (3)

## 2018-12-03 NOTE — MR AVS SNAPSHOT
"              After Visit Summary   12/3/2018    Nicky Hayes    MRN: [id number]           Patient Information     Date Of Birth          1995        Visit Information        Provider Department      12/3/2018 11:30 AM Alejandrina Flanagan NP [facility]        Today's Diagnoses     Diarrhea, unspecified type    -  1       Follow-ups after your visit        Follow-up notes from your care team     Return if symptoms worsen or fail to improve.      Who to contact     If you have questions or need follow up information about today's clinic visit or your schedule please contact [facility] AND [facility] directly at [phone].  Normal or non-critical lab and imaging results will be communicated to you by deviantARThart, letter or phone within 4 business days after the clinic has received the results. If you do not hear from us within 7 days, please contact the clinic through Goodybagt or phone. If you have a critical or abnormal lab result, we will notify you by phone as soon as possible.  Submit refill requests through devsisters or call your pharmacy and they will forward the refill request to us. Please allow 3 business days for your refill to be completed.          Additional Information About Your Visit        MyChart Information     devsisters gives you secure access to your electronic health record. If you see a primary care provider, you can also send messages to your care team and make appointments. If you have questions, please call your primary care clinic.  If you do not have a primary care provider, please call [phone] and they will assist you.        Care EveryWhere ID     This is your Care EveryWhere ID. This could be used by other organizations to access your [place] medical records  [id number]        Your Vitals Were     Pulse Temperature Height BMI (Body Mass Index)          70 97.8  F (36.6  C) 5' 5\" (1.651 m) 24.15 kg/m2         Blood Pressure from Last 3 Encounters: "   12/03/18 108/70   08/24/18 92/58   08/02/18 112/64    Weight from Last 3 Encounters:   12/03/18 145 lb 2 oz (65.8 kg)   08/24/18 141 lb 5 oz (64.1 kg)   08/02/18 139 lb 9.6 oz (63.3 kg)              Today, you had the following     No orders found for display       Primary Care Provider Office Phone # Fax #    Arpita Carrington [phone] [address] [facility] [address]        Equal Access to Services     [facility]: Hadii aad ku hadasho Soomaali, waaxda luqadaha, qaybta kaalmada adeegyada, fabiana howard haysimone grey . So [facility] [phone].    ATENCIÓN: Si habla español, tiene a mazariegos disposición servicios gratuitos de asistencia lingüística. [facility] [phone].    We comply with applicable federal civil rights laws and [place] laws. We do not discriminate on the basis of race, color, national origin, age, disability, sex, sexual orientation, or gender identity.            Thank you!     Thank you for choosing [facility] AND [facility]  for your care. Our goal is always to provide you with excellent care. Hearing back from our patients is one way we can continue to improve our services. Please take a few minutes to complete the written survey that you may receive in the mail after your visit with us. Thank you!             Your Updated Medication List - Protect others around you: Learn how to safely use, store and throw away your medicines at www.disposemymeds.org.          This list is accurate as of 12/3/18  1:21 PM.  Always use your most recent med list.                   Brand Name Dispense Instructions for use Diagnosis    EPINEPHrine 0.3 MG/0.3ML injection 2-pack    EPIPEN/ADRENACLICK/or ANY BX GENERIC EQUIV     Inject 0.3 mg into the muscle once as needed for allergic reaction for up to 1 dose.        norelgestromin-ethinyl estradiol 150-35 MCG/24HR patch    ORTHO EVRA    11 patch    Remove old patch and apply new patch onto the skin 1x/week for  11 wks. Do not wear patch 12 wks, then repeat.  Skip placebo week q3 months.    Encounter for surveillance of transdermal patch hormonal contraceptive device

## 2018-12-03 NOTE — NURSING NOTE
"Chief Complaint   Patient presents with     Diarrhea     Gas     belching     Belching and bloated started last night.    Initial /70  Pulse 70  Temp 97.8  F (36.6  C)  Ht 5' 5\" (1.651 m)  Wt 145 lb 2 oz (65.8 kg)  BMI 24.15 kg/m2 Estimated body mass index is 24.15 kg/(m^2) as calculated from the following:    Height as of this encounter: 5' 5\" (1.651 m).    Weight as of this encounter: 145 lb 2 oz (65.8 kg).    Medication Reconciliation: complete      Norma J. Gosselin, LPN  "

## 2018-12-03 NOTE — PROGRESS NOTES
"  SUBJECTIVE:   Nicky Hayes is a 23 year old female who presents to clinic today for the following health issues:    Diarrhea  Onset: 1 week    Description:   Consistency of stool: watery, mucousy and orange  Blood in stool: no   Number of loose stools in past 24 hours: 24+    Progression of Symptoms:  improving and constant    Accompanying Signs & Symptoms:  Fever: no   Nausea or vomiting; YES- nausea, no vomiting  Abdominal pain: YES- lower quads  Episodes of constipation: no   Weight loss: no   Patient reports having \"rotten egg burps\" for much of the last day, has not had any in the last few hours     History:   Ill contacts: YES- son, boyfriend and self have been sick for the last week or two with the \"flu\"  Recent use of antibiotics: no    Recent travels: no          Recent medication-new or changes(Rx or OTC): no     Precipitating factors:   Acidic or greasy foods    Alleviating factors:   nothing    Therapies Tried and outcome:  Water and toast; tsp of baking soda in water; Outcome: no change    Problem list and histories reviewed & adjusted, as indicated.  Additional history: none    Patient Active Problem List   Diagnosis     Allergic state     Allergic rhinitis due to pollen     Depression with anxiety     Dysmenorrhea     Idiopathic scoliosis     Subacute endomyometritis     Past Surgical History:   Procedure Laterality Date     DILATION AND CURETTAGE N/A 05/17/2017    Dr. Hazel; [facility] - retained placenta/endometritis       Social History   Substance Use Topics     Smoking status: Never Smoker     Smokeless tobacco: Never Used     Alcohol use 0.0 oz/week      Comment: Alcoholic Drinks/day: rare     Family History   Problem Relation Age of Onset     GERD Mother      GERD Maternal Grandmother      Ulcerative Colitis Maternal Grandfather          Current Outpatient Prescriptions   Medication Sig Dispense Refill     EPINEPHrine (EPIPEN/ADRENACLICK/OR ANY BX GENERIC EQUIV) 0.3 MG/0.3ML injection 2-pack " "Inject 0.3 mg into the muscle once as needed for allergic reaction for up to 1 dose.       norelgestromin-ethinyl estradiol (ORTHO EVRA) 150-35 MCG/24HR patch Remove old patch and apply new patch onto the skin 1x/week for 11 wks. Do not wear patch 12 wks, then repeat.  Skip placebo week q3 months. 11 patch 5     Allergies   Allergen Reactions     Bermuda Grass Itching     Dust Mite Extract Itching     Mold Itching     Pollen Extract Itching     Tree Nuts  [Nuts] Itching       Reviewed and updated as needed this visit by clinical staff  Tobacco  Allergies  Meds  Problems  Med Hx  Surg Hx  Fam Hx  Soc Hx        Reviewed and updated as needed this visit by Provider  Tobacco  Allergies  Meds  Problems  Med Hx  Surg Hx  Fam Hx  Soc Hx          ROS:  CONSTITUTIONAL: NEGATIVE for fever, chills, change in weight  INTEGUMENTARY/SKIN: NEGATIVE for worrisome rashes, moles or lesions  RESP: NEGATIVE for significant cough or SOB  CV: NEGATIVE for chest pain, palpitations or peripheral edema  GI: POSITIVE for diarrhea, dyspepsia, gas or bloating and nausea  : NEGATIVE for frequency, dysuria, or hematuria  MUSCULOSKELETAL: NEGATIVE for significant arthralgias or myalgia  NEURO: NEGATIVE for weakness, dizziness or paresthesias    OBJECTIVE:     /70  Pulse 70  Temp 97.8  F (36.6  C)  Ht 5' 5\" (1.651 m)  Wt 145 lb 2 oz (65.8 kg)  BMI 24.15 kg/m2  Body mass index is 24.15 kg/(m^2).  GENERAL: healthy, alert and no distress, fatigued  NECK: no adenopathy, no asymmetry, masses, or scars and thyroid normal to palpation  RESP: lungs clear to auscultation - no rales, rhonchi or wheezes  CV: regular rate and rhythm, normal S1 S2, no S3 or S4, no murmur, click or rub, no peripheral edema and peripheral pulses strong  ABDOMEN: tenderness epigastric, no organomegaly or masses, bowel sounds normal and no palpable or pulsatile masses    Diagnostic Test Results:  none     ASSESSMENT/PLAN:     1. Diarrhea, unspecified " type  Likely gastroenteritis, early in course of symptoms, no evidence of concerning process at this time. Patient well hydrated, appears tired. Discussed fluid intake to maintain hydration, including gatorade for electrolytes and jello/popsicles/broth as tolerated. When able, advance diet to saltine crackers/BRAT diet. If concerning symptoms such as difficulty breathing, dehydration, blood in stools should develop, return to clinic for further evaluation. Discussed trial of pepto-bismol or immodium for diarrhea control and importance of adequate hand washing.     Alejandrina Flanagan NP  [facility] AND \[facility]\""

## 2018-12-04 ASSESSMENT — ANXIETY QUESTIONNAIRES: GAD7 TOTAL SCORE: 8

## 2019-01-14 ENCOUNTER — TELEPHONE (OUTPATIENT)
Dept: OBGYN | Facility: OTHER | Age: 24
End: 2019-01-14

## 2019-01-14 DIAGNOSIS — O36.80X0 ENCOUNTER TO DETERMINE FETAL VIABILITY OF PREGNANCY, SINGLE OR UNSPECIFIED FETUS: Primary | ICD-10-CM

## 2019-01-14 NOTE — TELEPHONE ENCOUNTER
Nicky is scheduled to see Dr. Hazel for her OB Px on 1/22/19.  She said that her LMP was on 12/11/19.  Please order a dating ultrasound to be done before her appointment.  May Fuchs on 1/14/2019 at 8:52 AM

## 2019-01-22 ENCOUNTER — MYC REFILL (OUTPATIENT)
Dept: FAMILY MEDICINE | Facility: OTHER | Age: 24
End: 2019-01-22

## 2019-01-22 ENCOUNTER — PRENATAL OFFICE VISIT (OUTPATIENT)
Dept: OBGYN | Facility: OTHER | Age: 24
End: 2019-01-22
Attending: OBSTETRICS & GYNECOLOGY
Payer: COMMERCIAL

## 2019-01-22 ENCOUNTER — HOSPITAL ENCOUNTER (OUTPATIENT)
Dept: ULTRASOUND IMAGING | Facility: OTHER | Age: 24
Discharge: HOME | End: 2019-01-22
Attending: OBSTETRICS & GYNECOLOGY | Admitting: OBSTETRICS & GYNECOLOGY
Payer: COMMERCIAL

## 2019-01-22 DIAGNOSIS — Z34.91 NORMAL PREGNANCY IN FIRST TRIMESTER: Primary | ICD-10-CM

## 2019-01-22 DIAGNOSIS — Z34.90 EARLY STAGE OF PREGNANCY: ICD-10-CM

## 2019-01-22 DIAGNOSIS — O36.80X0 ENCOUNTER TO DETERMINE FETAL VIABILITY OF PREGNANCY, SINGLE OR UNSPECIFIED FETUS: ICD-10-CM

## 2019-01-22 DIAGNOSIS — Z34.91 NORMAL PREGNANCY IN FIRST TRIMESTER: ICD-10-CM

## 2019-01-22 LAB
B-HCG SERPL-ACNC: 1658 IU/L
PROGEST SERPL-MCNC: 14.4 NG/ML

## 2019-01-22 PROCEDURE — 76817 TRANSVAGINAL US OBSTETRIC: CPT

## 2019-01-22 PROCEDURE — 99213 OFFICE O/P EST LOW 20 MIN: CPT | Performed by: OBSTETRICS & GYNECOLOGY

## 2019-01-22 PROCEDURE — 36415 COLL VENOUS BLD VENIPUNCTURE: CPT | Performed by: OBSTETRICS & GYNECOLOGY

## 2019-01-22 PROCEDURE — 84702 CHORIONIC GONADOTROPIN TEST: CPT | Performed by: OBSTETRICS & GYNECOLOGY

## 2019-01-22 PROCEDURE — 84144 ASSAY OF PROGESTERONE: CPT | Performed by: OBSTETRICS & GYNECOLOGY

## 2019-01-22 RX ORDER — PNV NO.95/FERROUS FUM/FOLIC AC 28MG-0.8MG
1 TABLET ORAL DAILY
COMMUNITY
Start: 2019-01-22 | End: 2019-01-22

## 2019-01-22 SDOH — HEALTH STABILITY: MENTAL HEALTH: HOW OFTEN DO YOU HAVE A DRINK CONTAINING ALCOHOL?: NEVER

## 2019-01-22 NOTE — PROGRESS NOTES
CC: New OB visit    HPI:  Nicky Hayes is  at 6w0d based on Patient's last menstrual period was 2018./first trimester US.  She notes issues of nausea and breast tenderness. Some cramps, no bleeding.    Obstetric History       T1      L1     SAB0   TAB0   Ectopic0   Multiple0   Live Births1       # Outcome Date GA Lbr Surinder/2nd Weight Sex Delivery Anes PTL Lv   2 Current            1 Term 04/10/17 40w0d  3.289 kg (7 lb 4 oz) M IVD EPI N NINA        STI: (denies HSV, Hep C, Hep B, HIV, Syphilis, Chlamydia, Gonorrhea)  Last pap smear: normal pap  Chickenpox history: as a child    Past Medical History:   Diagnosis Date     Chronic cough 2002    resolved     Closed fracture of lower end of radius 2006    L arm     History of early menarche 2007     Pregnancy           has a past surgical history that includes Dilation and curettage (N/A, 2017).    Social History     Tobacco Use     Smoking status: Never Smoker     Smokeless tobacco: Never Used   Substance Use Topics     Alcohol use: No     Alcohol/week: 0.0 oz     Frequency: Never     Comment: Alcoholic Drinks/day: rare     Drug use: No     Comment: Drug use: No     Family History   Problem Relation Age of Onset     GERD Mother      GERD Maternal Grandmother      Ulcerative Colitis Maternal Grandfather          Current Outpatient Medications   Medication     Prenatal Vit-Fe Fumarate-FA (PRENATAL VITAMIN) 27-0.8 MG TABS     EPINEPHrine (EPIPEN/ADRENACLICK/OR ANY BX GENERIC EQUIV) 0.3 MG/0.3ML injection 2-pack     No current facility-administered medications for this visit.      Allergies   Allergen Reactions     Bermuda Grass Itching     Dust Mite Extract Itching     Mold Itching     Pollen Extract Itching     Tree Nuts  [Nuts] Itching     Immunization History   Administered Date(s) Administered     Comvax (HIB/HepB) 1995     DTAP (<7y) 1995, 1995, 1997, 1998, 2002     DTaP, Unspecified  08/27/2008     Flu, Unspecified 10/28/2009, 10/23/2012     HPV Quadrivalent 08/20/2010, 03/15/2011, 10/23/2012     Hep B, Peds or Adolescent 1995     HepB, Unspecified 1995, 1995, 1995     Hib, Unspecified 1995, 1997, 1995     Hpv, Unspecified  08/20/2010     Influenza (IIV3) PF 10/27/2010, 10/23/2012, 11/18/2013     Influenza Vaccine IM 3yrs+ 4 Valent IIV4 10/05/2016, 10/16/2018     MMR 07/28/1998, 07/28/1998, 08/29/2002, 08/29/2002     Mantoux Tuberculin Skin Test 07/09/2018, 07/16/2018     OPV, trivalent, live 04/27/1998     Polio, Unspecified  1997, 1997, 04/27/1998, 08/29/2002     TDAP Vaccine (Adacel) 08/27/2008     TDAP Vaccine (Boostrix) 01/18/2017     Varicella 04/27/1998, 08/06/2009, 08/20/2010       REVIEW OF SYSTEMS  Neg except as above.    EXAM: LMP 12/11/2018   Breastfeeding? Unknown   Gen: NAD  US shows an IUP which is too early to see fetal heart tones, about 5 weeks gestation.    Procedure:  US OB TRANSVAGINAL ONLY     Gestational age by LMP: 6 weeks 0 days     Clinical history: Assess dating and viabilty.  LMP 12/11/19; Encounter  to determine fetal viability of pregnancy, single or unspecified  fetus.     Comparisons: None.     Gestation number: There is a small intrauterine gestational sac with a  probable small fetal pole.     Cardiac activity:  None seen, likely due to the small size     Heart rate:  None     Measurements:         Gest. Sac:  4 weeks 6 days     Adnexa:  Unremarkable     Ultrasound Age:  4 weeks 6 days     Ultrasound EDC:  9/24/2019                                                                      Impression:  Early intrauterine pregnancy measuring 4 weeks 6 days by  mean sac diameter. No fetal heart tones are seen, possibly due to the  small size. Recommend close continued follow-up.     ANALI HSIEH MD    I/P  (Z34.91) Normal pregnancy in first trimester  (primary encounter diagnosis)  Comment:   Plan: HCG  quantitative pregnancy, Progesterone            (Z34.90) Early stage of pregnancy  Comment:   Plan: HCG quantitative pregnancy          Will inform her of labs and repeat US next week if pregnancy appears to be viable.    Jeremías Hazel MD FACOG  10:20 AM 1/22/2019

## 2019-01-23 RX ORDER — PNV NO.95/FERROUS FUM/FOLIC AC 28MG-0.8MG
1 TABLET ORAL DAILY
Qty: 100 TABLET | Refills: 3 | Status: SHIPPED | OUTPATIENT
Start: 2019-01-23 | End: 2019-10-15

## 2019-01-23 NOTE — TELEPHONE ENCOUNTER
Prenatal Vit-Fe Fumarate-FA (PRENATAL VITAMIN) 27-0.8 MG TABS    LOV-08/02/2018    Future Office visit:       Routing refill request to provider for review/approval because: Drug not on the [facility] or [facility] refill protocol or controlled substance      Unable to complete prescription refill per RNMedication Refill Policy.................... Guadalupe Dinero ....................  1/23/2019   2:28 PM

## 2019-01-24 ENCOUNTER — TELEPHONE (OUTPATIENT)
Dept: OBGYN | Facility: OTHER | Age: 24
End: 2019-01-24

## 2019-01-24 DIAGNOSIS — Z34.91 NORMAL PREGNANCY IN FIRST TRIMESTER: ICD-10-CM

## 2019-01-24 LAB — B-HCG SERPL-ACNC: 3605 IU/L

## 2019-01-24 PROCEDURE — 84702 CHORIONIC GONADOTROPIN TEST: CPT | Performed by: OBSTETRICS & GYNECOLOGY

## 2019-01-24 PROCEDURE — 36415 COLL VENOUS BLD VENIPUNCTURE: CPT | Performed by: OBSTETRICS & GYNECOLOGY

## 2019-01-24 NOTE — TELEPHONE ENCOUNTER
Patient called back.  Explained we will call her with results of US on 1/31/19. Nany Acevedo RN on 1/24/2019 at 3:32 PM

## 2019-01-31 ENCOUNTER — HOSPITAL ENCOUNTER (OUTPATIENT)
Dept: ULTRASOUND IMAGING | Facility: OTHER | Age: 24
Discharge: HOME | End: 2019-01-31
Attending: OBSTETRICS & GYNECOLOGY | Admitting: OBSTETRICS & GYNECOLOGY
Payer: COMMERCIAL

## 2019-01-31 DIAGNOSIS — Z34.90 EARLY STAGE OF PREGNANCY: ICD-10-CM

## 2019-01-31 PROCEDURE — 76817 TRANSVAGINAL US OBSTETRIC: CPT

## 2019-01-31 PROCEDURE — 76801 OB US < 14 WKS SINGLE FETUS: CPT

## 2019-02-05 ENCOUNTER — PRENATAL OFFICE VISIT (OUTPATIENT)
Dept: OBGYN | Facility: OTHER | Age: 24
End: 2019-02-05
Attending: OBSTETRICS & GYNECOLOGY
Payer: COMMERCIAL

## 2019-02-05 VITALS
RESPIRATION RATE: 16 BRPM | HEART RATE: 64 BPM | WEIGHT: 152 LBS | DIASTOLIC BLOOD PRESSURE: 60 MMHG | SYSTOLIC BLOOD PRESSURE: 102 MMHG | HEIGHT: 65 IN | BODY MASS INDEX: 25.33 KG/M2

## 2019-02-05 DIAGNOSIS — Z34.90 NORMAL PREGNANCY, ANTEPARTUM: ICD-10-CM

## 2019-02-05 DIAGNOSIS — Z3A.01 6 WEEKS GESTATION OF PREGNANCY: Primary | ICD-10-CM

## 2019-02-05 LAB
ABO + RH BLD: NORMAL
ABO + RH BLD: NORMAL
ALBUMIN UR-MCNC: NEGATIVE MG/DL
APPEARANCE UR: CLEAR
BACTERIA #/AREA URNS HPF: ABNORMAL /HPF
BASOPHILS # BLD AUTO: 0.1 10E9/L (ref 0–0.2)
BASOPHILS NFR BLD AUTO: 0.6 %
BILIRUB UR QL STRIP: NEGATIVE
BLD GP AB SCN SERPL QL: NORMAL
BLOOD BANK CMNT PATIENT-IMP: NORMAL
C TRACH DNA SPEC QL PROBE+SIG AMP: NOT DETECTED
COLOR UR AUTO: YELLOW
DIFFERENTIAL METHOD BLD: NORMAL
EOSINOPHIL # BLD AUTO: 0.2 10E9/L (ref 0–0.7)
EOSINOPHIL NFR BLD AUTO: 2 %
ERYTHROCYTE [DISTWIDTH] IN BLOOD BY AUTOMATED COUNT: 12.6 % (ref 10–15)
GLUCOSE UR STRIP-MCNC: NEGATIVE MG/DL
HCT VFR BLD AUTO: 43.8 % (ref 35–47)
HGB BLD-MCNC: 14.5 G/DL (ref 11.7–15.7)
HGB UR QL STRIP: ABNORMAL
IMM GRANULOCYTES # BLD: 0 10E9/L (ref 0–0.4)
IMM GRANULOCYTES NFR BLD: 0.4 %
KETONES UR STRIP-MCNC: NEGATIVE MG/DL
LEUKOCYTE ESTERASE UR QL STRIP: NEGATIVE
LYMPHOCYTES # BLD AUTO: 2.4 10E9/L (ref 0.8–5.3)
LYMPHOCYTES NFR BLD AUTO: 23.4 %
MCH RBC QN AUTO: 29.2 PG (ref 26.5–33)
MCHC RBC AUTO-ENTMCNC: 33.1 G/DL (ref 31.5–36.5)
MCV RBC AUTO: 88 FL (ref 78–100)
MONOCYTES # BLD AUTO: 0.6 10E9/L (ref 0–1.3)
MONOCYTES NFR BLD AUTO: 5.8 %
N GONORRHOEA DNA SPEC QL PROBE+SIG AMP: NOT DETECTED
NEUTROPHILS # BLD AUTO: 6.8 10E9/L (ref 1.6–8.3)
NEUTROPHILS NFR BLD AUTO: 67.8 %
NITRATE UR QL: NEGATIVE
NON-SQ EPI CELLS #/AREA URNS LPF: ABNORMAL /LPF
PH UR STRIP: 6 PH (ref 5–9)
PLATELET # BLD AUTO: 388 10E9/L (ref 150–450)
RBC # BLD AUTO: 4.97 10E12/L (ref 3.8–5.2)
RBC #/AREA URNS AUTO: ABNORMAL /HPF
SOURCE: ABNORMAL
SP GR UR STRIP: 1.01 (ref 1–1.03)
SPECIMEN EXP DATE BLD: NORMAL
SPECIMEN SOURCE: NORMAL
UROBILINOGEN UR STRIP-ACNC: 0.2 EU/DL (ref 0.2–1)
WBC # BLD AUTO: 10.1 10E9/L (ref 4–11)
WBC #/AREA URNS AUTO: ABNORMAL /HPF

## 2019-02-05 PROCEDURE — 85025 COMPLETE CBC W/AUTO DIFF WBC: CPT | Performed by: OBSTETRICS & GYNECOLOGY

## 2019-02-05 PROCEDURE — 84999 UNLISTED CHEMISTRY PROCEDURE: CPT | Performed by: OBSTETRICS & GYNECOLOGY

## 2019-02-05 PROCEDURE — 87340 HEPATITIS B SURFACE AG IA: CPT | Performed by: OBSTETRICS & GYNECOLOGY

## 2019-02-05 PROCEDURE — 81220 CFTR GENE COM VARIANTS: CPT | Performed by: OBSTETRICS & GYNECOLOGY

## 2019-02-05 PROCEDURE — 86900 BLOOD TYPING SEROLOGIC ABO: CPT | Performed by: OBSTETRICS & GYNECOLOGY

## 2019-02-05 PROCEDURE — 86780 TREPONEMA PALLIDUM: CPT | Performed by: OBSTETRICS & GYNECOLOGY

## 2019-02-05 PROCEDURE — 87491 CHLMYD TRACH DNA AMP PROBE: CPT | Performed by: OBSTETRICS & GYNECOLOGY

## 2019-02-05 PROCEDURE — 99207 ZZC OB VISIT-NO CHARGE - GICH ONLY: CPT | Performed by: OBSTETRICS & GYNECOLOGY

## 2019-02-05 PROCEDURE — 87591 N.GONORRHOEAE DNA AMP PROB: CPT | Performed by: OBSTETRICS & GYNECOLOGY

## 2019-02-05 PROCEDURE — 81329 SMN1 GENE DOS/DELETION ALYS: CPT | Performed by: OBSTETRICS & GYNECOLOGY

## 2019-02-05 PROCEDURE — 86850 RBC ANTIBODY SCREEN: CPT | Performed by: OBSTETRICS & GYNECOLOGY

## 2019-02-05 PROCEDURE — 87086 URINE CULTURE/COLONY COUNT: CPT | Performed by: OBSTETRICS & GYNECOLOGY

## 2019-02-05 PROCEDURE — 86901 BLOOD TYPING SEROLOGIC RH(D): CPT | Performed by: OBSTETRICS & GYNECOLOGY

## 2019-02-05 PROCEDURE — 81001 URINALYSIS AUTO W/SCOPE: CPT | Performed by: OBSTETRICS & GYNECOLOGY

## 2019-02-05 PROCEDURE — 87389 HIV-1 AG W/HIV-1&-2 AB AG IA: CPT | Performed by: OBSTETRICS & GYNECOLOGY

## 2019-02-05 PROCEDURE — 36415 COLL VENOUS BLD VENIPUNCTURE: CPT | Performed by: OBSTETRICS & GYNECOLOGY

## 2019-02-05 ASSESSMENT — MIFFLIN-ST. JEOR: SCORE: 1455.35

## 2019-02-05 ASSESSMENT — PAIN SCALES - GENERAL: PAINLEVEL: NO PAIN (0)

## 2019-02-05 NOTE — NURSING NOTE
Chief Complaint   Patient presents with     Prenatal Care     OBPX  6W5D      COMPA - 09-26-19  Medication Reconciliation: completed   Ambika Agudelo LPN  2/5/2019 8:34 AM

## 2019-02-05 NOTE — PROGRESS NOTES
CC: New OB visit  HPI:  Nikcy Hayes is  at 6w5d based on Patient's last menstrual period was 2018./first trimester US.  She notes issues of fatigue, cramps. Boyfriend has history of SMA in his family. Headaches and seasonal allergies treated with OTC meds.   She would like screening for SMA and CF.    Obstetric History       T1      L1     SAB0   TAB0   Ectopic0   Multiple0   Live Births1       # Outcome Date GA Lbr Surinder/2nd Weight Sex Delivery Anes PTL Lv   2 Current            1 Term 04/10/17 40w0d  3.289 kg (7 lb 4 oz) M IVD EPI N NINA        STI: (denies HSV, Hep C, Hep B, HIV, Syphilis, Chlamydia, Gonorrhea)  Last pap smear: No results found for: PAP  Chickenpox history: immunized  Past Medical History:   Diagnosis Date     Chronic cough 2002    resolved     Closed fracture of lower end of radius 2006    L arm     History of early menarche 2007     Pregnancy           has a past surgical history that includes Dilation and curettage (N/A, 2017).    Social History     Tobacco Use     Smoking status: Never Smoker     Smokeless tobacco: Never Used   Substance Use Topics     Alcohol use: No     Alcohol/week: 0.0 oz     Frequency: Never     Comment: Alcoholic Drinks/day: rare     Drug use: No     Comment: Drug use: No     Family History   Problem Relation Age of Onset     GERD Mother      GERD Maternal Grandmother      Ulcerative Colitis Maternal Grandfather          Current Outpatient Medications   Medication     EPINEPHrine (EPIPEN/ADRENACLICK/OR ANY BX GENERIC EQUIV) 0.3 MG/0.3ML injection 2-pack     Prenatal Vit-Fe Fumarate-FA (PRENATAL VITAMIN) 27-0.8 MG TABS     No current facility-administered medications for this visit.      Allergies   Allergen Reactions     Bermuda Grass Itching     Dust Mite Extract Itching     Mold Itching     Pollen Extract Itching     Tree Nuts  [Nuts] Itching     Immunization History   Administered Date(s) Administered     Comvax  "(HIB/HepB) 1997     DTAP (<7y) 1997, 1997, 1997, 07/28/1998, 08/29/2002     DTaP, Unspecified 08/27/2008     Flu, Unspecified 10/28/2009, 10/23/2012     HPV Quadrivalent 08/20/2010, 03/15/2011, 10/23/2012     Hep B, Peds or Adolescent 1997     HepB, Unspecified 1997, 1997, 1997     Hib, Unspecified 1997, 1997, 1997     Hpv, Unspecified  08/20/2010     Influenza (IIV3) PF 10/27/2010, 10/23/2012, 11/18/2013     Influenza Vaccine IM 3yrs+ 4 Valent IIV4 10/05/2016, 10/16/2018     MMR 07/28/1998, 07/28/1998, 08/29/2002, 08/29/2002     Mantoux Tuberculin Skin Test 07/09/2018, 07/16/2018     OPV, trivalent, live 04/27/1998     Polio, Unspecified  1997, 1997, 04/27/1998, 08/29/2002     TDAP Vaccine (Adacel) 08/27/2008     TDAP Vaccine (Boostrix) 01/18/2017     Varicella 04/27/1998, 08/06/2009, 08/20/2010           REVIEW OF SYSTEMS  General: negative  Resp: No shortness of breath, dyspnea on exertion, cough, or hemoptysis  CV: negative  GI: negative  : negative  Musculoskeletal: negative  Neurologic: negative  Psychiatric: negative  Hematologic: negative  Endocrine: negative    EXAM: /60 (BP Location: Right arm, Patient Position: Sitting, Cuff Size: Adult Regular)   Pulse 64   Resp 16   Ht 1.651 m (5' 5\")   Wt 68.9 kg (152 lb)   LMP 12/11/2018   Breastfeeding? No   BMI 25.29 kg/m    Gen: NAD  CV: RRR with normal S1, S2, no GRM  Resp: CTA Bilaterally  Breasts: normal without suspicious masses, skin changes or axillary nodes.  Abdomen: NT, ND  Pelvic exam: normal vagina and vulva, normal cervix without lesions or tenderness, uterus normal size anteverted, adenxa normal in size without tenderness, exam chaperoned by nurse.  Extremities: No TTP, no deformity  Neuro: CN II-XII intact grossly, moves all extremities  Psych: normal affect and mentation.    I/P  (Z3A.01) 6 weeks gestation of pregnancy  (primary encounter diagnosis)  Comment: "   Plan: UA with Microscopic reflex to Culture, ABO/Rh         type and screen, CBC with platelets         differential, GC/Chlamydia by PCR - HI,[facility],         Hepatitis B surface antigen, HIV Antigen         Antibody Combo, Treponema Abs w Reflex to RPR         and Titer            Discussed safety, nutrition, screening for cystic fibrosis, spina bifida, spinal muscular atrophy, quad screen, cffDNA screening as appropriate.  F/U scheduled, discussed call schedule rotation with [facility] and Dr. Moffett, general surgery.  Return visit in 1 month     Pregnancy risk factors include:    History of retained products/infection requiring admission and abx    Jeremías Hazel MD FACOG  8:44 AM 2019

## 2019-02-06 LAB
HBV SURFACE AG SERPL QL IA: NONREACTIVE
HIV 1+2 AB+HIV1 P24 AG SERPL QL IA: NONREACTIVE
T PALLIDUM AB SER QL: NONREACTIVE

## 2019-02-07 LAB
BACTERIA SPEC CULT: NORMAL
SPECIMEN SOURCE: NORMAL

## 2019-02-10 LAB
CFTR ALLELE 2 BLD/T QL: NEGATIVE
CFTR P.R117H+5T VAR BLD/T QL: NORMAL
CYSTIC FIBROSIS 165 VARIANT INTERP: NORMAL

## 2019-02-14 LAB
RESULT: NORMAL
SEND OUTS MISC TEST CODE: NORMAL
SEND OUTS MISC TEST SPECIMEN: NORMAL
TEST NAME: NORMAL

## 2019-02-23 VITALS
SYSTOLIC BLOOD PRESSURE: 115 MMHG | RESPIRATION RATE: 16 BRPM | TEMPERATURE: 98.8 F | DIASTOLIC BLOOD PRESSURE: 74 MMHG | HEIGHT: 65 IN | OXYGEN SATURATION: 100 % | WEIGHT: 150 LBS | BODY MASS INDEX: 24.99 KG/M2

## 2019-02-23 PROCEDURE — 99284 EMERGENCY DEPT VISIT MOD MDM: CPT | Mod: 25 | Performed by: EMERGENCY MEDICINE

## 2019-02-23 PROCEDURE — 99283 EMERGENCY DEPT VISIT LOW MDM: CPT | Mod: Z6 | Performed by: EMERGENCY MEDICINE

## 2019-02-23 PROCEDURE — 96372 THER/PROPH/DIAG INJ SC/IM: CPT | Performed by: EMERGENCY MEDICINE

## 2019-02-23 PROCEDURE — 90471 IMMUNIZATION ADMIN: CPT | Performed by: EMERGENCY MEDICINE

## 2019-02-23 ASSESSMENT — MIFFLIN-ST. JEOR: SCORE: 1446.28

## 2019-02-24 ENCOUNTER — APPOINTMENT (OUTPATIENT)
Dept: ULTRASOUND IMAGING | Facility: OTHER | Age: 24
End: 2019-02-24
Attending: EMERGENCY MEDICINE
Payer: COMMERCIAL

## 2019-02-24 ENCOUNTER — HOSPITAL ENCOUNTER (EMERGENCY)
Facility: OTHER | Age: 24
Discharge: HOME | End: 2019-02-24
Attending: EMERGENCY MEDICINE | Admitting: EMERGENCY MEDICINE
Payer: COMMERCIAL

## 2019-02-24 DIAGNOSIS — N89.8 VAGINAL DISCHARGE: ICD-10-CM

## 2019-02-24 DIAGNOSIS — N93.9 VAGINAL BLEEDING: ICD-10-CM

## 2019-02-24 LAB
ABO + RH BLD: NORMAL
ABO + RH BLD: NORMAL
B-HCG SERPL-ACNC: NORMAL IU/L
BASOPHILS # BLD AUTO: 0.1 10E9/L (ref 0–0.2)
BASOPHILS NFR BLD AUTO: 0.4 %
BLD GP AB SCN SERPL QL: NORMAL
BLOOD BANK CMNT PATIENT-IMP: NORMAL
BLOOD BANK CMNT PATIENT-IMP: NORMAL
C TRACH DNA SPEC QL PROBE+SIG AMP: NOT DETECTED
DATE RH IMM GL GVN: NORMAL
DIFFERENTIAL METHOD BLD: ABNORMAL
EOSINOPHIL # BLD AUTO: 0.3 10E9/L (ref 0–0.7)
EOSINOPHIL NFR BLD AUTO: 2.3 %
ERYTHROCYTE [DISTWIDTH] IN BLOOD BY AUTOMATED COUNT: 13.2 % (ref 10–15)
FETAL CELL SCN BLD QL ROSETTE: NORMAL
HCT VFR BLD AUTO: 39.2 % (ref 35–47)
HGB BLD-MCNC: 13.6 G/DL (ref 11.7–15.7)
IMM GRANULOCYTES # BLD: 0.1 10E9/L (ref 0–0.4)
IMM GRANULOCYTES NFR BLD: 0.4 %
LYMPHOCYTES # BLD AUTO: 3.2 10E9/L (ref 0.8–5.3)
LYMPHOCYTES NFR BLD AUTO: 22.4 %
MCH RBC QN AUTO: 29.9 PG (ref 26.5–33)
MCHC RBC AUTO-ENTMCNC: 34.7 G/DL (ref 31.5–36.5)
MCV RBC AUTO: 86 FL (ref 78–100)
MONOCYTES # BLD AUTO: 0.9 10E9/L (ref 0–1.3)
MONOCYTES NFR BLD AUTO: 6 %
N GONORRHOEA DNA SPEC QL PROBE+SIG AMP: NOT DETECTED
NEUTROPHILS # BLD AUTO: 9.7 10E9/L (ref 1.6–8.3)
NEUTROPHILS NFR BLD AUTO: 68.5 %
PLATELET # BLD AUTO: 370 10E9/L (ref 150–450)
RBC # BLD AUTO: 4.55 10E12/L (ref 3.8–5.2)
RH IG VIALS RECOM PATIENT: NORMAL
SPECIMEN SOURCE: NORMAL
SPECIMEN SOURCE: NORMAL
WBC # BLD AUTO: 14.1 10E9/L (ref 4–11)
WET PREP SPEC: NORMAL

## 2019-02-24 PROCEDURE — 84702 CHORIONIC GONADOTROPIN TEST: CPT | Performed by: EMERGENCY MEDICINE

## 2019-02-24 PROCEDURE — 76817 TRANSVAGINAL US OBSTETRIC: CPT | Mod: TC

## 2019-02-24 PROCEDURE — 87210 SMEAR WET MOUNT SALINE/INK: CPT | Performed by: EMERGENCY MEDICINE

## 2019-02-24 PROCEDURE — 87591 N.GONORRHOEAE DNA AMP PROB: CPT | Performed by: EMERGENCY MEDICINE

## 2019-02-24 PROCEDURE — 86850 RBC ANTIBODY SCREEN: CPT | Performed by: EMERGENCY MEDICINE

## 2019-02-24 PROCEDURE — 86900 BLOOD TYPING SEROLOGIC ABO: CPT | Performed by: EMERGENCY MEDICINE

## 2019-02-24 PROCEDURE — 87491 CHLMYD TRACH DNA AMP PROBE: CPT | Performed by: EMERGENCY MEDICINE

## 2019-02-24 PROCEDURE — 36415 COLL VENOUS BLD VENIPUNCTURE: CPT | Performed by: EMERGENCY MEDICINE

## 2019-02-24 PROCEDURE — 85025 COMPLETE CBC W/AUTO DIFF WBC: CPT | Performed by: EMERGENCY MEDICINE

## 2019-02-24 PROCEDURE — 85461 HEMOGLOBIN FETAL: CPT | Performed by: EMERGENCY MEDICINE

## 2019-02-24 PROCEDURE — 86901 BLOOD TYPING SEROLOGIC RH(D): CPT | Performed by: EMERGENCY MEDICINE

## 2019-02-24 PROCEDURE — 25000128 H RX IP 250 OP 636: Performed by: EMERGENCY MEDICINE

## 2019-02-24 RX ADMIN — RHO(D) IMMUNE GLOBULIN (HUMAN) 300 MCG: 1500 SOLUTION INTRAMUSCULAR at 02:10

## 2019-02-24 NOTE — ED TRIAGE NOTES
"Pt presents to ED with mother in law. Pt states she is 9 weeks pregnant and has been having brown vaginal discharge for last 4 days with intermittent heavy cramping, states she has to change pad/tampons every few hours but states \"just because it's soiled, not because it's heavy\". States she also has been having \"cloudy urine\" but denies pain or frequency/urgency.   Minna Lam    "

## 2019-02-24 NOTE — ED PROVIDER NOTES
"Patient:  Nicky Hayes  MRN: [id number] : 1995  Date of Service:  19 2:09 AM      Subjective:  HPI:  Nicky Hayes is a 23 year old female who is Rh- female with known pregnancy approximately 9 weeks gestational age, previous healthy pregnancy 2 years ago presenting to the ED with vaginal discharge/bleeding (brownish discharge) no trauma.  No urinary symptoms.  No recent trauma.  No infectious symptoms fevers or chills.  No weight loss.  She has had 4 days of intermittent abdominal cramping like period pain.  No STD history she has been monogamous with her baby daddy  Some brown discharge and no bright red blood  Last pregnancy with some bleeding, normal pregnancy    Normal IUP by ultrasound     ROS:  As documented in the HPI, full review of systems otherwise negative    PMH/PSH: Reviewed and noncontributory other than above    FamHx:   Family History   Problem Relation Age of Onset     GERD Mother      GERD Maternal Grandmother      Ulcerative Colitis Maternal Grandfather        SOC: Noncontributory    ALL:    Allergies   Allergen Reactions     Bermuda Grass Itching     Dust Mite Extract Itching     Mold Itching     Pollen Extract Itching     Tree Nuts  [Nuts] Itching       Meds:   No current facility-administered medications for this encounter.      Current Outpatient Medications   Medication     Prenatal Vit-Fe Fumarate-FA (PRENATAL VITAMIN) 27-0.8 MG TABS     EPINEPHrine (EPIPEN/ADRENACLICK/OR ANY BX GENERIC EQUIV) 0.3 MG/0.3ML injection 2-pack         Objective:  VS:   /74   Temp 98.8  F (37.1  C) (Tympanic)   Resp 16   Ht 1.651 m (5' 5\")   Wt 68 kg (150 lb)   LMP 2018   SpO2 100%   BMI 24.96 kg/m       Physical Exam:     Gen:  Alert, nontoxic, NAD.     HEENT:  Normocephalic, PERRLA, no scleral icterus.  External nose and ears normal, no discharge   Neck:  Trachea midline, supple, normal ROM   Lungs:  CTAB, no obvious added breath sounds, equal chest rise   Cardio:  " Regular, normal peripheral pulses, no abnormal added heart sounds   Abd:  Soft, nontender, non distended, No CVA tenderness.   Ext:  No peripheral edema.     Neuro:  A&Ox4, CN II-XII grossly intact. Facial symmetry and speech intact Ambulatory with normal gait.     MSK: no obvious areas of cellulitis, deformity or edema.  Normal ROM of major joints.     Skin: warm, dry, no obvious rash.  Genital exam without any blood in the vaginal vault.  The cervix appears inflamed with obvious discharge.  No cervical motion tenderness no adnexal tenderness no tissue extruding from the os      Medical Decision Making:  Vaginal bleeding in pregnancy  This was atraumatic.  RhoGam was administered as patient is Rh-.  Pelvic exam without any concern for trauma or ongoing miscarriage.  An abdominal ultrasound showed normal fetal heart rate and fetal activity. No ongoing bleeding    Follow up with primary care doctor in 2-3 days to recheck vaginal bleeding and follow-up STD results.  Patient was agreeable with the plan & all questions/concerns addressed prior to discharge.      Final Clinical Impression:  Vaginal bleeding in pregnancy      Lita Pretty  2/24/2019  2:09 AM  Emergency Medicine/Internal Medicine Physician     Lita Pretty MD  02/24/19 [id number]

## 2019-02-24 NOTE — ED AVS SNAPSHOT
[facility]  [address]  [place] [address]  Phone:  [phone]  Fax:  [phone]                                    Nicky Hayes   MRN: [id number]    Department:  [facility]   Date of Visit:  2/23/2019           After Visit Summary Signature Page    I have received my discharge instructions, and my questions have been answered. I have discussed any challenges I see with this plan with the nurse or doctor.    ..........................................................................................................................................  Patient/Patient Representative Signature      ..........................................................................................................................................  Patient Representative Print Name and Relationship to Patient    ..................................................               ................................................  Date                                   Time    ..........................................................................................................................................  Reviewed by Signature/Title    ...................................................              ..............................................  Date                                               Time          22EPIC Rev 08/18

## 2019-02-24 NOTE — RESULT ENCOUNTER NOTE
Final result for both N. Gonorrhoeae PCR and Chlamydia Trachomatis PCR are NEGATIVE.  No treatment or change in treatment per [facility] ED Lab Result protocol.

## 2019-02-26 ENCOUNTER — PRENATAL OFFICE VISIT (OUTPATIENT)
Dept: OBGYN | Facility: OTHER | Age: 24
End: 2019-02-26
Attending: OBSTETRICS & GYNECOLOGY
Payer: COMMERCIAL

## 2019-02-26 VITALS
HEART RATE: 80 BPM | BODY MASS INDEX: 25.46 KG/M2 | DIASTOLIC BLOOD PRESSURE: 70 MMHG | RESPIRATION RATE: 16 BRPM | TEMPERATURE: 98.6 F | WEIGHT: 152.8 LBS | HEIGHT: 65 IN | SYSTOLIC BLOOD PRESSURE: 120 MMHG

## 2019-02-26 DIAGNOSIS — Z34.90 NORMAL PREGNANCY, ANTEPARTUM: ICD-10-CM

## 2019-02-26 DIAGNOSIS — O20.0 THREATENED ABORTION: Primary | ICD-10-CM

## 2019-02-26 PROCEDURE — 99207 ZZC OB VISIT-NO CHARGE - GICH ONLY: CPT | Performed by: OBSTETRICS & GYNECOLOGY

## 2019-02-26 ASSESSMENT — ANXIETY QUESTIONNAIRES
5. BEING SO RESTLESS THAT IT IS HARD TO SIT STILL: NOT AT ALL
7. FEELING AFRAID AS IF SOMETHING AWFUL MIGHT HAPPEN: NOT AT ALL
1. FEELING NERVOUS, ANXIOUS, OR ON EDGE: NOT AT ALL
4. TROUBLE RELAXING: NOT AT ALL
IF YOU CHECKED OFF ANY PROBLEMS ON THIS QUESTIONNAIRE, HOW DIFFICULT HAVE THESE PROBLEMS MADE IT FOR YOU TO DO YOUR WORK, TAKE CARE OF THINGS AT HOME, OR GET ALONG WITH OTHER PEOPLE: NOT DIFFICULT AT ALL
3. WORRYING TOO MUCH ABOUT DIFFERENT THINGS: NOT AT ALL
2. NOT BEING ABLE TO STOP OR CONTROL WORRYING: NOT AT ALL
GAD7 TOTAL SCORE: 0
6. BECOMING EASILY ANNOYED OR IRRITABLE: NOT AT ALL

## 2019-02-26 ASSESSMENT — PATIENT HEALTH QUESTIONNAIRE - PHQ9: SUM OF ALL RESPONSES TO PHQ QUESTIONS 1-9: 0

## 2019-02-26 ASSESSMENT — MIFFLIN-ST. JEOR: SCORE: 1458.98

## 2019-02-26 ASSESSMENT — PAIN SCALES - GENERAL: PAINLEVEL: NO PAIN (0)

## 2019-02-26 NOTE — PROGRESS NOTES
CC: Recheck OB visit at 9w5d    HPI: Nicky Hayes presents for a routine OB visit now at 9w5d  She has no concerns. Denies cramping, bleeding, normal fetal movement. Had some brown discharge. ED did an US showing viable IUP.  Neg workup for BV/G/c. Eating mainly carbs with episodes of lightheadedness thought to be hypoglycemic episodes. No other issues.    Obstetric History       T1      L1     SAB0   TAB0   Ectopic0   Multiple0   Live Births1       # Outcome Date GA Lbr Surinder/2nd Weight Sex Delivery Anes PTL Lv   2 Current            1 Term 04/10/17 40w0d  3.289 kg (7 lb 4 oz) M IVD EPI N NINA        Current Outpatient Medications   Medication     EPINEPHrine (EPIPEN/ADRENACLICK/OR ANY BX GENERIC EQUIV) 0.3 MG/0.3ML injection 2-pack     Prenatal Vit-Fe Fumarate-FA (PRENATAL VITAMIN) 27-0.8 MG TABS     No current facility-administered medications for this visit.        O: /70 (BP Location: Right arm, Patient Position: Sitting, Cuff Size: Adult Large)   Pulse 80   Temp 98.6  F (37  C)   Resp 16   Wt 69.3 kg (152 lb 12.8 oz)   LMP 2018   Breastfeeding? No   BMI 25.43 kg/m    Body mass index is 25.43 kg/m .  See OB flow sheet  EXAM:  NAD  Bedside US shows viable IUP with HR of 170's    Results for orders placed or performed during the hospital encounter of 19   US OB Transvaginal Only    Narrative    EXAM:    US Pregnancy     EXAM DATE/TIME:   2019 12:34 AM    CLINICAL HISTORY:   Signs and symptoms; Lmp or gestational age (in weeks): 9; Antepartum   complications; Other: Vaginal bleeding; Pregnant; Additional info: Vaginal   bleeding. ? Viability    TECHNIQUE:    Real-time obstetrical ultrasound of the maternal pelvis and a first trimester   pregnancy, less than 14 weeks 0 days, with image documentation.     COMPARISON:   Comparison: US OB <14 WEEKS WITH TRANSVAGINAL SINGLE 2019 8:19 AM    FINDINGS:     There is a single, live intrauterine gestation identified.    Fetal cardiac activity was documented at 176 beats/min.  The ovaries are normal by grayscale appearance.  No adnexal mass is present.  There is no significant free fluid.      Impression    IMPRESSION:    Live IUP, as described above.            THIS DOCUMENT HAS BEEN ELECTRONICALLY SIGNED BY CORI EMERY MD   CBC with platelets differential   Result Value Ref Range    WBC 14.1 (H) 4.0 - 11.0 10e9/L    RBC Count 4.55 3.8 - 5.2 10e12/L    Hemoglobin 13.6 11.7 - 15.7 g/dL    Hematocrit 39.2 35.0 - 47.0 %    MCV 86 78 - 100 fl    MCH 29.9 26.5 - 33.0 pg    MCHC 34.7 31.5 - 36.5 g/dL    RDW 13.2 10.0 - 15.0 %    Platelet Count 370 150 - 450 10e9/L    Diff Method Automated Method     % Neutrophils 68.5 %    % Lymphocytes 22.4 %    % Monocytes 6.0 %    % Eosinophils 2.3 %    % Basophils 0.4 %    % Immature Granulocytes 0.4 %    Absolute Neutrophil 9.7 (H) 1.6 - 8.3 10e9/L    Absolute Lymphocytes 3.2 0.8 - 5.3 10e9/L    Absolute Monocytes 0.9 0.0 - 1.3 10e9/L    Absolute Eosinophils 0.3 0.0 - 0.7 10e9/L    Absolute Basophils 0.1 0.0 - 0.2 10e9/L    Abs Immature Granulocytes 0.1 0 - 0.4 10e9/L   HCG quantitative pregnancy (blood)   Result Value Ref Range    HCG Quantitative Serum 133,138 IU/L   Rho (D) immune globulin (RhoGam) Lab Study   Result Value Ref Range    Rhogam Order Order received    Rh Immune Globulin Study   Result Value Ref Range    ABO A     RH(D) Neg     Fetal Blood Screen Canceled, Test credited     Blood Bank Comment Suitable for Rh Immunoglobulin     RhIg Administered 02/24/2019     Amount of RHIG Required 300 micrograms Rh Immune Globulin required     Antibody Screen Neg    Wet prep   Result Value Ref Range    Specimen Description Vagina     Wet Prep No yeast seen     Wet Prep No Trichomonas seen     Wet Prep No clue cells seen    GC/Chlamydia by PCR - HI,GH   Result Value Ref Range    Specimen Source Cervical     Neisseria gonorrhoreae PCR Not Detected NDET^Not Detected    Chlamydia Trachomatis PCR  Not Detected NDET^Not Detected       A/P: 9w5d gestation with continued viability.    Recheck in 4 weeks  Rhogam given in ED     Pregnancy risk factors include:    History of retained products/infection requiring admission and abx  Threatened SAB      Jeremías Hazel MD FACOG  9:55 AM 2019

## 2019-02-27 ASSESSMENT — ANXIETY QUESTIONNAIRES: GAD7 TOTAL SCORE: 0

## 2019-03-04 ENCOUNTER — NURSE TRIAGE (OUTPATIENT)
Dept: OBGYN | Facility: OTHER | Age: 24
End: 2019-03-04

## 2019-03-04 NOTE — TELEPHONE ENCOUNTER
Pt states that she is 10 weeks pregnant and last week she had to get a shot because she was bleeding. States that she is bleeding again and she is wondering if she needs to get another shot.

## 2019-03-04 NOTE — TELEPHONE ENCOUNTER
"Patient called to ask if she needs another rhogam injection. She received one on 2/23/19. She is having spotting again. No new or concerning symptoms. She did share that she had intercourse the day prior to this episode of bleeding and was also on her feet all day yesterday. Patient informed that no rhogam is needed at this time and she will call back if bleeding persists or worsens.    Francie Brewster RN...................3/4/2019 10:55 AM    Additional Information    Negative: Shock suspected (e.g., cold/pale/clammy skin, too weak to stand, low BP, rapid pulse)    Negative: Difficult to awaken or acting confused  (e.g., disoriented, slurred speech)    Negative: Passed out (i.e., lost consciousness, collapsed and was not responding)    Negative: Sounds like a life-threatening emergency to the triager    Negative: [1] Vaginal bleeding AND [2] pregnant > 20 weeks    Negative: Not pregnant or pregnancy status unknown    Negative: SEVERE abdominal pain    Negative: [1] SEVERE vaginal bleeding (i.e., soaking 2 pads / hour, large blood clots) AND [2] present 2 or more hours    Negative: [1] MODERATE vaginal bleeding (i.e., soaking 1 pad / hour; clots) AND [2] present > 6 hours    Negative: [1] MODERATE vaginal bleeding (i.e., soaking 1 pad / hour; clots) AND [2] pregnant > 12 weeks    Negative: Passed tissue (e.g., gray-white)    Negative: Shoulder pain    Negative: Pale skin (pallor) of new onset or worsening    Negative: Patient sounds very sick or weak to the triager    Negative: [1] Constant abdominal pain AND [2] present > 2 hours    Negative: Fever > 100.4 F (38.0 C)    Negative: [1] Intermittent lower abdominal pain (e.g., cramping) AND [2] present > 24 hours    Negative: Prior history of \"ectopic pregnancy\" or previous tubal surgery (e.g., tubal ligation)    Negative: Burning with urination    Negative: Has IUD    Negative: MILD vaginal bleeding (i.e., clots or similar to menstrual period; not just " "spotting)    Negative: SPOTTING lasts > 48 hours or spotting happens more than once in a week    Negative: Unusual vaginal discharge (e.g., bad smelling, yellow, green, or foamy-white)    Negative: Not feeling pregnant any longer (e.g., breast tenderness or nausea has disappeared)    SPOTTING (single or brief episode) (all triage questions negative)    Answer Assessment - Initial Assessment Questions  1. ONSET: \"When did this bleeding start?\"        3/3/19 1100  2. DESCRIPTION: \"Describe the bleeding that you are having.\" \"How much bleeding is there?\"     - SPOTTING: spotting, or pinkish / brownish mucous discharge; does not fill panti-liner or pad     - MILD:  less than 1 pad / hour; less than patient's usual menstrual bleeding    - MODERATE: 1-2 pads / hour; small-medium blood clots (e.g., pea, grape, small coin)     - SEVERE: soaking 2 or more pads/hour for 2 or more hours; bleeding not contained by pads or continuous red blood from vagina; large blood clots (e.g., golf ball, large coin)       Spotting, dark  3. ABDOMINAL PAIN SEVERITY: If present, ask: \"How bad is it?\"  (e.g., Scale 1-10; mild, moderate, or severe)    - MILD (1-3): doesn't interfere with normal activities, abdomen soft and not tender to touch     - MODERATE (4-7): interferes with normal activities or awakens from sleep, tender to touch     - SEVERE (8-10): excruciating pain, doubled over, unable to do any normal activities      Nothing new, light cramping whole pregnancy  4. PREGNANCY: \"Do you know how many weeks or months pregnant you are?\" \"When was the first day of your last normal menstrual period?\"      10+4  5. HEMODYNAMIC STATUS: \"Are you weak or feeling lightheaded?\" If so, ask: \"Can you stand and walk normally?\"       denies  6. OTHER SYMPTOMS: \"What other symptoms are you having with the bleeding?\" (e.g., passed tissue, vaginal discharge, fever, menstrual-type cramps)      no    Protocols used: PREGNANCY - VAGINAL BLEEDING LESS THAN 20 " WEEKS EGA-ADULT-AH

## 2019-03-15 ENCOUNTER — PRENATAL OFFICE VISIT (OUTPATIENT)
Dept: OBGYN | Facility: OTHER | Age: 24
End: 2019-03-15
Attending: OBSTETRICS & GYNECOLOGY
Payer: COMMERCIAL

## 2019-03-15 ENCOUNTER — NURSE TRIAGE (OUTPATIENT)
Dept: FAMILY MEDICINE | Facility: OTHER | Age: 24
End: 2019-03-15

## 2019-03-15 VITALS
BODY MASS INDEX: 25.66 KG/M2 | WEIGHT: 154.2 LBS | DIASTOLIC BLOOD PRESSURE: 80 MMHG | HEART RATE: 96 BPM | SYSTOLIC BLOOD PRESSURE: 134 MMHG

## 2019-03-15 DIAGNOSIS — O20.9 BLEEDING IN EARLY PREGNANCY: Primary | ICD-10-CM

## 2019-03-15 LAB
ABO + RH BLD: ABNORMAL
ABO + RH BLD: ABNORMAL
BLD GP AB INVEST PLASRBC-IMP: ABNORMAL
BLD GP AB SCN SERPL QL: ABNORMAL
BLOOD BANK CMNT PATIENT-IMP: ABNORMAL
SPECIMEN EXP DATE BLD: ABNORMAL

## 2019-03-15 PROCEDURE — 86900 BLOOD TYPING SEROLOGIC ABO: CPT | Performed by: OBSTETRICS & GYNECOLOGY

## 2019-03-15 PROCEDURE — 86901 BLOOD TYPING SEROLOGIC RH(D): CPT | Performed by: OBSTETRICS & GYNECOLOGY

## 2019-03-15 PROCEDURE — 86850 RBC ANTIBODY SCREEN: CPT | Performed by: OBSTETRICS & GYNECOLOGY

## 2019-03-15 PROCEDURE — 99207 ZZC OB VISIT-NO CHARGE - GICH ONLY: CPT | Performed by: OBSTETRICS & GYNECOLOGY

## 2019-03-15 PROCEDURE — 86870 RBC ANTIBODY IDENTIFICATION: CPT | Performed by: OBSTETRICS & GYNECOLOGY

## 2019-03-15 PROCEDURE — 36415 COLL VENOUS BLD VENIPUNCTURE: CPT | Performed by: OBSTETRICS & GYNECOLOGY

## 2019-03-15 ASSESSMENT — PAIN SCALES - GENERAL: PAINLEVEL: MILD PAIN (2)

## 2019-03-15 NOTE — TELEPHONE ENCOUNTER
"Patient calling and states last night she had some bright red bleeding not enough to fill a pad.  Patient states is 12 weeks one day pregnant and has had spotting through out pregnancy but last night was different.    States is back to having the spotting but continues to having cramping rates about 1-2/10.    Patient advised to schedule appointment today and transferred to scheduling.    Esme Moffett RN on 3/15/2019 at 12:37 PM      Reason for Disposition    [1] Intermittent lower abdominal pain (e.g., cramping) AND [2] present > 24 hours    Additional Information    Negative: Shock suspected (e.g., cold/pale/clammy skin, too weak to stand, low BP, rapid pulse)    Negative: Difficult to awaken or acting confused  (e.g., disoriented, slurred speech)    Negative: Passed out (i.e., lost consciousness, collapsed and was not responding)    Negative: Sounds like a life-threatening emergency to the triager    Negative: [1] Vaginal bleeding AND [2] pregnant > 20 weeks    Negative: Not pregnant or pregnancy status unknown    Negative: Pale skin (pallor) of new onset or worsening    Negative: Patient sounds very sick or weak to the triager    Negative: [1] Constant abdominal pain AND [2] present > 2 hours    Negative: Fever > 100.4 F (38.0 C)    Negative: SEVERE abdominal pain    Negative: [1] SEVERE vaginal bleeding (i.e., soaking 2 pads / hour, large blood clots) AND [2] present 2 or more hours    Negative: [1] MODERATE vaginal bleeding (i.e., soaking 1 pad / hour; clots) AND [2] present > 6 hours    Negative: [1] MODERATE vaginal bleeding (i.e., soaking 1 pad / hour; clots) AND [2] pregnant > 12 weeks    Negative: Passed tissue (e.g., gray-white)    Negative: Shoulder pain    Answer Assessment - Initial Assessment Questions  1. ONSET: \"When did this bleeding start?\"        Last night bleeding more than spotting  2. DESCRIPTION: \"Describe the bleeding that you are having.\" \"How much bleeding is there?\"     - SPOTTING: " "spotting, or pinkish / brownish mucous discharge; does not fill panti-liner or pad     - MILD:  less than 1 pad / hour; less than patient's usual menstrual bleeding    - MODERATE: 1-2 pads / hour; small-medium blood clots (e.g., pea, grape, small coin)     - SEVERE: soaking 2 or more pads/hour for 2 or more hours; bleeding not contained by pads or continuous red blood from vagina; large blood clots (e.g., golf ball, large coin)       Spotting-mild  3. ABDOMINAL PAIN SEVERITY: If present, ask: \"How bad is it?\"  (e.g., Scale 1-10; mild, moderate, or severe)    - MILD (1-3): doesn't interfere with normal activities, abdomen soft and not tender to touch     - MODERATE (4-7): interferes with normal activities or awakens from sleep, tender to touch     - SEVERE (8-10): excruciating pain, doubled over, unable to do any normal activities      Cramping- 2/10  4. PREGNANCY: \"Do you know how many weeks or months pregnant you are?\" \"When was the first day of your last normal menstrual period?\"      12weeks and 1 day  5. HEMODYNAMIC STATUS: \"Are you weak or feeling lightheaded?\" If so, ask: \"Can you stand and walk normally?\"       denies  6. OTHER SYMPTOMS: \"What other symptoms are you having with the bleeding?\" (e.g., passed tissue, vaginal discharge, fever, menstrual-type cramps)      Menstrual type cramping, bright red bleeding now spotting today    Protocols used: PREGNANCY - VAGINAL BLEEDING LESS THAN 20 WEEKS [id number]      "

## 2019-03-15 NOTE — PROGRESS NOTES
Acute OB Visit    S: Patient presents today for vaginal spotting. She has been spotting for most of the last 3 weeks. She developed increased cramping since yesterday afternoon and had heavier spotting/light bleeding overnight that was bright red. Today she is back to mild cramping and dark spotting.     O: /80 (BP Location: Right arm, Patient Position: Sitting, Cuff Size: Adult Regular)   Pulse 96   Wt 69.9 kg (154 lb 3.2 oz)   LMP 2018   BMI 25.66 kg/m    Gen: Well-appearing, NAD  See OB Flowsheet    Bedside US: single viable fetus, . No fetal movement noted    A/P:  Nicky Hayes is a 23 year old  at 12w1d by 6w0d US, here for spotting- reassuring US  Rh negative: Will get repeat T&S to ensure she still has passive Anti-D. If antibody screen is negative, will need Rhogam    RTC 2 weeks with DAB    Zaira Moffett MD  OB/GYN  3/15/2019 1:06 PM

## 2019-03-19 ENCOUNTER — TELEPHONE (OUTPATIENT)
Dept: OBGYN | Facility: OTHER | Age: 24
End: 2019-03-19

## 2019-03-19 ENCOUNTER — HOSPITAL ENCOUNTER (EMERGENCY)
Facility: OTHER | Age: 24
Discharge: HOME | End: 2019-03-19
Attending: EMERGENCY MEDICINE | Admitting: EMERGENCY MEDICINE
Payer: COMMERCIAL

## 2019-03-19 ENCOUNTER — TELEPHONE (OUTPATIENT)
Dept: FAMILY MEDICINE | Facility: OTHER | Age: 24
End: 2019-03-19

## 2019-03-19 VITALS
HEART RATE: 92 BPM | OXYGEN SATURATION: 100 % | RESPIRATION RATE: 17 BRPM | SYSTOLIC BLOOD PRESSURE: 103 MMHG | DIASTOLIC BLOOD PRESSURE: 84 MMHG | WEIGHT: 154 LBS | TEMPERATURE: 98 F | BODY MASS INDEX: 25.63 KG/M2

## 2019-03-19 DIAGNOSIS — O46.90 VAGINAL BLEEDING IN PREGNANCY: ICD-10-CM

## 2019-03-19 PROCEDURE — 99284 EMERGENCY DEPT VISIT MOD MDM: CPT | Performed by: EMERGENCY MEDICINE

## 2019-03-19 PROCEDURE — 99282 EMERGENCY DEPT VISIT SF MDM: CPT | Mod: Z6 | Performed by: EMERGENCY MEDICINE

## 2019-03-19 NOTE — TELEPHONE ENCOUNTER
Nicky states that she has been bleeding since the beginning of her pregnancy so she decided to see OBGYN for this pregnancy. The bleeding has since increased. She saw Dr. Moon Moffett and states she did not get any answers or any education as to what she should be doing. Moon could not see placenta when she did an ultrasound but yesterday at the ER- the doctor said that the placenta could be over the cervix.  Yesterday, the ER doc said to follow up with Yasemin today but Yasemin is gone and Nicky does not like Moon Moffett.     Nicky is wondering if she can see SMS to educate her on what is going on and also- she read online that she should be on complete bed rest while others say just pelvic rest. She is very confused by all of this. Can we work her in?   Sondra Arredondo............................... 3/19/2019 4:05 PM

## 2019-03-19 NOTE — ED AVS SNAPSHOT
[facility]  [address]  [place] [address]  Phone:  [phone]  Fax:  [phone]                                    Nicky Hayes   MRN: [id number]    Department:  [facility]   Date of Visit:  3/19/2019           After Visit Summary Signature Page    I have received my discharge instructions, and my questions have been answered. I have discussed any challenges I see with this plan with the nurse or doctor.    ..........................................................................................................................................  Patient/Patient Representative Signature      ..........................................................................................................................................  Patient Representative Print Name and Relationship to Patient    ..................................................               ................................................  Date                                   Time    ..........................................................................................................................................  Reviewed by Signature/Title    ...................................................              ..............................................  Date                                               Time          22EPIC Rev 08/18

## 2019-03-19 NOTE — TELEPHONE ENCOUNTER
"If you were closer to term, that is when we occasionally use complete bedrest; however with a normal heartbeat and otherwise normal pregnancy - we recommend pelvic rest in this case.    I do not have a ton of new information; as the ultrasounds done in the ER and by Dr. Moon Moffett today were both bedside ultrasounds, therefore I am unable to see the images.     My recommendation would be for her to be on pelvic rest; but also take it easy (limit lifting, bending, etc) and follow up with DAB next week.   Although bleeding is never completely \"normal\" during pregnancy, there are many pregnancies that have bleeding occur.  The most reassuring thing is that baby has had good heart tones and movement during both of the ultrasounds.    Arpita Carrington, DO  "

## 2019-03-19 NOTE — TELEPHONE ENCOUNTER
If there is concern for placenta previa, she should be on pelvic rest. It is too early to definitively tell on ultrasound if she will have a placenta previa but given her bleeding, it is reasonable to start pelvic rest for now.  Zaira Moffett MD  OB/GYN  3/19/2019 10:19 AM

## 2019-03-19 NOTE — TELEPHONE ENCOUNTER
Patient called in regards to getting an appointment to discuss a lot of bleeding she has had. She is almost 13 weeks pregnant and has seen another OBGYN about this but states they are not really giving her any answers on to why she is bleeding. She states she thinks she has placenta previa. Please call her back in regards to this. Thank You!

## 2019-03-19 NOTE — TELEPHONE ENCOUNTER
Patient informed of below. Also encouraged her to be seen if bleeding increases or she feels weak, lightheaded, or more fatigued.    Francie Brewster RN...................3/19/2019 11:39 AM

## 2019-03-19 NOTE — ED PROVIDER NOTES
Nicky Hayes  : 1995 Age: 23 year old Sex: female MRN: [id number]    CC: Vaginal bleeding in pregnancy    HPI: Nicyk Hayes is a 23 year old  at approximately 13 weeks with a with a history significant for spotting type vaginal bleeding since the ninth week of this pregnancy who presents with an increase in her vaginal bleeding that awoke her from sleep tonight.  She states that she is not having any increased pain, but woke up with an increase in her vaginal bleeding and has soaked 2 thin pads in the last 20 minutes.  She has had no resistant vaginal discharge and no dysuria, frequency, urgency.  She denies any lightheadedness associated with this    ED Course and MDM:  Vaginal bleeding in pregnancy: Miscarriage versus placenta previa versus ectopic.  Patient has a known intrauterine pregnancy, concern for ectopic would be low at this time.  I performed a bedside ultrasound which demonstrated single intrauterine pregnancy with a heart rate of approximately 160, with fetal movements present.  There does appear that there may be some placenta previa.  A pelvic exam was performed with minimal dark red blood in the vagina, small clot in the external loss of the cervix.  I was unable to express any further blood from the cervix, and on bimanual exam while the external loss is open to fingertip the remainder the cervical canal is quite firmly closed and the internal loss is quite firmly closed.  With the above findings my concern for dangerous bleed is very low at this time.  It may be that she is having all this bleeding because she does actually have a little bit of placenta previa.  We discussed that her current bleeding is not dangerous and that baby currently seems okay.  She should follow-up with Dr. Evans who is following her for this.  She received RhoGam 3 weeks ago as she is a negative, and had her labs checked recently with ongoing effect of the RhoGam.  We will hold off on giving her any  further at this time.    Final Clinical Impression:  Vaginal bleeding in pregnancy    Oli Gibbons MD  Internal Medicine and Emergency Medicine  4:28 AM 19      Physical Exam:  /84   Pulse 92   Temp 98  F (36.7  C) (Tympanic)   Resp 17   Wt 69.9 kg (154 lb)   LMP 2018   SpO2 100%   BMI 25.63 kg/m      Gen: Awake, alert, tearful  HEENT: MMM, EOMI  CV: RRR, WWP  Pulm: Nonlabored  Abd: Soft, nontender  : Normal external genitalia with some dark red blood in external vagina. Small amount of dark red blood in vaginal canal with small clot in external os. None able to be expressed from os with suprapubic pressure. External os open to fingertip but internal os firmly closed  Ext: Full ROM  Neuro: AOx3, no focal deficit  Psych: Tearful and worried    ROS:  10 point review of systems performed and negative except as noted in HPI.    Past Medical History:  Past Medical History:   Diagnosis Date     Chronic cough 2002    resolved     Closed fracture of lower end of radius 2006    L arm     History of early menarche 2007     Pregnancy              Family history:  Family History   Problem Relation Age of Onset     GERD Mother      GERD Maternal Grandmother      Ulcerative Colitis Maternal Grandfather          Social History:   Social History     Socioeconomic History     Marital status: Single     Spouse name: Not on file     Number of children: Not on file     Years of education: Not on file     Highest education level: Not on file   Occupational History     Not on file   Social Needs     Financial resource strain: Not on file     Food insecurity:     Worry: Not on file     Inability: Not on file     Transportation needs:     Medical: Not on file     Non-medical: Not on file   Tobacco Use     Smoking status: Never Smoker     Smokeless tobacco: Never Used   Substance and Sexual Activity     Alcohol use: No     Alcohol/week: 0.0 oz     Frequency: Never     Comment:  Alcoholic Drinks/day: rare     Drug use: No     Comment: Drug use: No     Sexual activity: Yes     Partners: Male     Birth control/protection: None   Lifestyle     Physical activity:     Days per week: Not on file     Minutes per session: Not on file     Stress: Not on file   Relationships     Social connections:     Talks on phone: Not on file     Gets together: Not on file     Attends Jain service: Not on file     Active member of club or organization: Not on file     Attends meetings of clubs or organizations: Not on file     Relationship status: Not on file     Intimate partner violence:     Fear of current or ex partner: Not on file     Emotionally abused: Not on file     Physically abused: Not on file     Forced sexual activity: Not on file   Other Topics Concern     Parent/sibling w/ CABG, MI or angioplasty before 65F 55M? Not Asked   Social History Narrative    Attends [employer]-senior fall 2014.     Doni Mathew-stepfather. Now  from her mother and out of the home.    Minimal contact with her biological father in [place].     Kiley Hayes Mother    Only child    Worked at [employer] in [employer]; now at [employer] [place].         Surgical History:  Past Surgical History:   Procedure Laterality Date     DILATION AND CURETTAGE N/A 05/17/2017    Dr. Hazel; [facility] - retained placenta/endometritis                  Oli Gibbons MD  03/19/19 0579

## 2019-03-19 NOTE — TELEPHONE ENCOUNTER
Patient left voicemail that she was told by ER to follow up with Dr. Hazel. Patient does not feel that she needs further evaluation, but does have some questions. She states that the blood last night was bright red with clots and soaked multiple pads. There was also possible placenta previa noted on bedside ultrasound. She is wondering if she needs to be on pelvic rest or activity restrictions for the time being. She also is wondering when she needs to call or present to ED in the future with this on going bleeding.    Francie Brewster RN...................3/19/2019 10:17 AM

## 2019-03-19 NOTE — ED TRIAGE NOTES
Patient presents to ED with vaginal bleeding that began around 0330.  Patient states she is 13 weeks pregnant and has had spotting since 9 weeks but woke up to heavy bleeding this morning.  Patient denies cramping or dizziness but states bleeding has had blood clots.

## 2019-03-20 NOTE — TELEPHONE ENCOUNTER
Agustin Saunders of SMS response.   Sondra Arredondo............................... 3/20/2019 11:58 AM

## 2019-04-01 ENCOUNTER — PRENATAL OFFICE VISIT (OUTPATIENT)
Dept: OBGYN | Facility: OTHER | Age: 24
End: 2019-04-01
Attending: OBSTETRICS & GYNECOLOGY
Payer: COMMERCIAL

## 2019-04-01 VITALS
SYSTOLIC BLOOD PRESSURE: 120 MMHG | WEIGHT: 155.8 LBS | HEART RATE: 72 BPM | TEMPERATURE: 99 F | DIASTOLIC BLOOD PRESSURE: 64 MMHG | BODY MASS INDEX: 25.93 KG/M2 | RESPIRATION RATE: 18 BRPM

## 2019-04-01 DIAGNOSIS — O20.9 VAGINAL BLEEDING AFFECTING EARLY PREGNANCY: ICD-10-CM

## 2019-04-01 DIAGNOSIS — Z34.90 NORMAL PREGNANCY, ANTEPARTUM: Primary | ICD-10-CM

## 2019-04-01 PROCEDURE — 99207 ZZC OB VISIT-NO CHARGE - GICH ONLY: CPT | Performed by: OBSTETRICS & GYNECOLOGY

## 2019-04-01 ASSESSMENT — PAIN SCALES - GENERAL: PAINLEVEL: MILD PAIN (3)

## 2019-04-01 NOTE — NURSING NOTE
Patient is here for ob check 14w4d, states has a lot to talk about. 2 Babystep coupons given.    Ade Schofield LPN .............4/1/2019     3:25 PM      Patient's last menstrual period was 12/11/2018.  Medication Reconciliation: complete    Ade Schofield LPN  4/1/2019 3:29 PM

## 2019-04-01 NOTE — PROGRESS NOTES
CC: Recheck OB visit at 14w4d    HPI: Nicky Hayes presents for a routine OB visit now at 14w4d  She has no concerns. Previous bleeding has resolved. No new concerns.    Obstetric History       T1      L1     SAB0   TAB0   Ectopic0   Multiple0   Live Births1       # Outcome Date GA Lbr Surinder/2nd Weight Sex Delivery Anes PTL Lv   2 Current            1 Term 04/10/17 40w0d  3.289 kg (7 lb 4 oz) M IVD EPI N NINA        Current Outpatient Medications   Medication     EPINEPHrine (EPIPEN/ADRENACLICK/OR ANY BX GENERIC EQUIV) 0.3 MG/0.3ML injection 2-pack     Prenatal Vit-Fe Fumarate-FA (PRENATAL VITAMIN) 27-0.8 MG TABS     No current facility-administered medications for this visit.          O: /64 (BP Location: Right arm, Patient Position: Sitting, Cuff Size: Adult Regular)   Pulse 72   Temp 99  F (37.2  C) (Tympanic)   Resp 18   Wt 70.7 kg (155 lb 12.8 oz)   LMP 2018   Breastfeeding? No   BMI 25.93 kg/m    Body mass index is 25.93 kg/m .  See OB flow sheet  EXAM:  NAD  FHT's 160  No abdominal tenderness.    A/P: 14w4d    Recheck in 2 weeks  Threatened SAB- continue pelvic rest    Pregnancy risk factors include:    History of retained products/infection requiring admission and abx  Threatened SAB    Jeremías Hazel MD FACOG  3:34 PM 2019

## 2019-04-15 ENCOUNTER — PRENATAL OFFICE VISIT (OUTPATIENT)
Dept: OBGYN | Facility: OTHER | Age: 24
End: 2019-04-15
Attending: OBSTETRICS & GYNECOLOGY
Payer: COMMERCIAL

## 2019-04-15 VITALS
DIASTOLIC BLOOD PRESSURE: 68 MMHG | RESPIRATION RATE: 18 BRPM | SYSTOLIC BLOOD PRESSURE: 102 MMHG | HEART RATE: 88 BPM | WEIGHT: 156.2 LBS | BODY MASS INDEX: 25.99 KG/M2 | TEMPERATURE: 97.2 F

## 2019-04-15 DIAGNOSIS — Z34.92 NORMAL PREGNANCY IN SECOND TRIMESTER: Primary | ICD-10-CM

## 2019-04-15 PROCEDURE — 99207 ZZC OB VISIT-NO CHARGE - GICH ONLY: CPT | Performed by: OBSTETRICS & GYNECOLOGY

## 2019-04-15 ASSESSMENT — PAIN SCALES - GENERAL: PAINLEVEL: NO PAIN (0)

## 2019-04-15 NOTE — PROGRESS NOTES
CC: Recheck OB visit at 16w4d    HPI: Nicky Hayes presents for a routine OB visit now at 16w4d  She has no concerns. Denies cramping, bleeding, normal fetal movement.    OB History    Para Term  AB Living   2 1 1 0 0 1   SAB TAB Ectopic Multiple Live Births   0 0 0 0 1      # Outcome Date GA Lbr Surinder/2nd Weight Sex Delivery Anes PTL Lv   2 Current            1 Term 04/10/17 40w0d  3.289 kg (7 lb 4 oz) M IVD EPI N NINA     Current Outpatient Medications   Medication     EPINEPHrine (EPIPEN/ADRENACLICK/OR ANY BX GENERIC EQUIV) 0.3 MG/0.3ML injection 2-pack     Prenatal Vit-Fe Fumarate-FA (PRENATAL VITAMIN) 27-0.8 MG TABS     No current facility-administered medications for this visit.          O: /68 (BP Location: Right arm)   Pulse 88   Temp 97.2  F (36.2  C)   Resp 18   Wt 70.9 kg (156 lb 3.2 oz)   LMP 2018   BMI 25.99 kg/m    Body mass index is 25.99 kg/m .  See OB flow sheet  EXAM:  NAD      Results for orders placed or performed during the hospital encounter of 19   POC US OB    Impression    Single intrauterine pregnancy, with positive fetal movement, fetal heart rate approximately 150-160.  There is evidence there may be a little bit of the placenta previa       A/P: 16w4d gestation    Recheck in 4 weeks  Declines aneuploidy screen  US next visit for fetal anatomy and placental locale.      Pregnancy risk factors include:    History of retained products/infection requiring admission and abx       Jeremías Hazel MD FACOG  3:38 PM 4/15/2019

## 2019-04-22 ENCOUNTER — OFFICE VISIT (OUTPATIENT)
Dept: FAMILY MEDICINE | Facility: OTHER | Age: 24
End: 2019-04-22
Attending: NURSE PRACTITIONER
Payer: COMMERCIAL

## 2019-04-22 VITALS
BODY MASS INDEX: 26.56 KG/M2 | WEIGHT: 159.4 LBS | RESPIRATION RATE: 20 BRPM | TEMPERATURE: 96.7 F | HEART RATE: 80 BPM | HEIGHT: 65 IN | DIASTOLIC BLOOD PRESSURE: 58 MMHG | SYSTOLIC BLOOD PRESSURE: 102 MMHG

## 2019-04-22 DIAGNOSIS — L24.9 IRRITANT CONTACT DERMATITIS, UNSPECIFIED TRIGGER: Primary | ICD-10-CM

## 2019-04-22 PROCEDURE — 99213 OFFICE O/P EST LOW 20 MIN: CPT | Performed by: NURSE PRACTITIONER

## 2019-04-22 RX ORDER — TRIAMCINOLONE ACETONIDE 1 MG/G
OINTMENT TOPICAL 2 TIMES DAILY
Qty: 60 G | Refills: 0 | Status: SHIPPED | OUTPATIENT
Start: 2019-04-22 | End: 2019-10-15

## 2019-04-22 ASSESSMENT — MIFFLIN-ST. JEOR: SCORE: 1488.91

## 2019-04-22 ASSESSMENT — PAIN SCALES - GENERAL: PAINLEVEL: NO PAIN (0)

## 2019-04-22 NOTE — PROGRESS NOTES
"Nursing Notes:   Madalyn Seaman LPN  4/22/2019  2:04 PM  Signed  Chief Complaint   Patient presents with     Derm Problem     Pt present to clinic today for a rash she has had for a week.  Initial /58 (BP Location: Right arm, Patient Position: Sitting, Cuff Size: Adult Regular)   Pulse 80   Temp 96.7  F (35.9  C) (Tympanic)   Resp 20   Ht 1.651 m (5' 5\")   Wt 72.3 kg (159 lb 6.4 oz)   LMP 12/11/2018   Breastfeeding? No   BMI 26.53 kg/m    Estimated body mass index is 26.53 kg/m  as calculated from the following:    Height as of this encounter: 1.651 m (5' 5\").    Weight as of this encounter: 72.3 kg (159 lb 6.4 oz).  Medication Reconciliation: complete    Madalyn Seaman LPN    SUBJECTIVE:   Nicky Hayes is a 23 year old female who presents to clinic today for the following health issues:    Rash      Duration: 1 week, but has had rash on her legs off and on for years.     Description  Location: legs, arms, and feet.   Itching: mild, worse if touched     Intensity:  moderate    Accompanying signs and symptoms: No fevers, chills, no cough, sob, no recent allergies    History (similar episodes/previous evaluation): None    Precipitating or alleviating factors:  New exposures:  None  Recent travel: no      Therapies tried and outcome: none        Problem list and histories reviewed & adjusted, as indicated.  Additional history: as documented    Current Outpatient Medications   Medication Sig Dispense Refill     EPINEPHrine (EPIPEN/ADRENACLICK/OR ANY BX GENERIC EQUIV) 0.3 MG/0.3ML injection 2-pack Inject 0.3 mg into the muscle once as needed for allergic reaction for up to 1 dose.       Prenatal Vit-Fe Fumarate-FA (PRENATAL VITAMIN) 27-0.8 MG TABS Take 1 tablet by mouth daily 100 tablet 3     triamcinolone (KENALOG) 0.1 % external ointment Apply topically 2 times daily For up to 10 days 60 g 0     Allergies   Allergen Reactions     Bermuda Grass Itching     Dust Mite Extract Itching     Mold " "Itching     Pollen Extract Itching     Tree Nuts  [Nuts] Itching         ROS:  Notable findings in the HPI.       OBJECTIVE:     /58 (BP Location: Right arm, Patient Position: Sitting, Cuff Size: Adult Regular)   Pulse 80   Temp 96.7  F (35.9  C) (Tympanic)   Resp 20   Ht 1.651 m (5' 5\")   Wt 72.3 kg (159 lb 6.4 oz)   LMP 12/11/2018   Breastfeeding? No   BMI 26.53 kg/m    Body mass index is 26.53 kg/m .  GENERAL: healthy, alert and no distress  EYES: Eyes grossly normal to inspection  HENT: normal cephalic/atraumatic and oral mucous membranes moist  RESP: without increased work of breathing.   CV: regular rates and rhythm and no peripheral edema  SKIN: Rash on legs, feet and arms, patches of red small papules, no vesicles, Excoriated. No drainage.   PSYCH: mentation appears normal, affect normal/bright    Diagnostic Test Results:  none     ASSESSMENT/PLAN:     1. Irritant contact dermatitis, unspecified trigger  - triamcinolone (KENALOG) 0.1 % external ointment; Apply topically 2 times daily For up to 10 days  Dispense: 60 g; Refill: 0    The rash is not consistent with scabies, viral illness or bacterial infection.     Medical Decision Making:    Differential Diagnosis:  Rash: Contact dermatitis  Inflammation  Purpura  Scabies    Serious Comorbid Conditions:  Adult:  17 weeks pregnant     PLAN:    Rash:  moisturizer  Reassurance was given to the patient  Rx  F/U with pcp if needed.     Followup:    If not improving or if condition worsens, follow up with your Primary Care Provider    I explained my diagnostic considerations and recommendations to the patient, who voiced understanding and agreement with the treatment plan. All questions were answered. We discussed potential side effects of any prescribed or recommended therapies, as well as expectations for response to treatments. She was advised to contact our office if there is no improvement or worsening of conditions or symptoms.  If s/s worsen or " persist, patient will either come back or follow up with PCP.    Disclaimer:  This note consists of words and symbols derived from keyboarding, dictation, or using voice recognition software. As a result, there may be errors in the script that have gone undetected. Please consider this when interpreting information found in this note.      Maria Antonia Jean NP, 4/22/2019 2:13 PM

## 2019-04-22 NOTE — NURSING NOTE
"Chief Complaint   Patient presents with     Derm Problem     Pt present to clinic today for a rash she has had for a week.  Initial /58 (BP Location: Right arm, Patient Position: Sitting, Cuff Size: Adult Regular)   Pulse 80   Temp 96.7  F (35.9  C) (Tympanic)   Resp 20   Ht 1.651 m (5' 5\")   Wt 72.3 kg (159 lb 6.4 oz)   LMP 12/11/2018   Breastfeeding? No   BMI 26.53 kg/m   Estimated body mass index is 26.53 kg/m  as calculated from the following:    Height as of this encounter: 1.651 m (5' 5\").    Weight as of this encounter: 72.3 kg (159 lb 6.4 oz).  Medication Reconciliation: complete    Madalyn Seaman LPN  "

## 2019-04-22 NOTE — PATIENT INSTRUCTIONS
"Patient Education     Contact Dermatitis  Contact dermatitis is a skin rash caused by something that touches the skin and makes it irritated and inflamed. Your skin may be red, swollen, dry, and may be cracked. Blisters may form and ooze. The rash will itch.  Contact dermatitis can form on the face and neck, backs of hands, forearms, genitals, and lower legs.  People can get contact dermatitis from lots of sources. These include:    Plants such as poison ivy, oak, or sumac    Chemicals in hair dyes and rinses, soaps, solvents, waxes, fingernail polish, and deodorants     Jewelry or watchbands made of nickel  Contact dermatitis is not passed from person to person.  Talk with your healthcare provider about what may have caused the rash. A type of allergy testing called \"patch testing\" may be used to discover what you are allergic to. You will need to avoid the source of your rash in the future to prevent it from coming back.  Treatment is done to relieve itching and prevent the rash from coming back. The rash should go away in a few days to a few weeks.  Home care  Your healthcare provider may prescribe medicine to relieve swelling and itching. Follow all instructions when using these medicines.  General care:    Avoid anything that heats up your skin, such as hot showers or baths, or direct sunlight. This can make itching worse.    Apply cold compresses to soothe your sores to help relieve your symptoms. Do this for 30 minutes 3 to 4 times a day. You can make a cold compress by soaking a cloth in cold water. Squeeze out excess water. You can add colloidal oatmeal to the water to help reduce itching. For severe itching in a small area, apply an ice pack wrapped in a thin towel. Do this for 20 minutes 3 to 4 times a day.    You can also try wet dressings. One way to do this is to wear a wet piece of clothing under a dry one. Wear a damp shirt under a dry shirt if your upper body is affected. This can relieve itching " and prevent you from scratching the affected area.    You can also help relieve large areas of itching by taking a lukewarm bath with colloidal oatmeal added to the water.    Use hydrocortisone cream for redness and irritation, unless another medicine was prescribed. You can also use benzocaine anesthetic cream or spray. Calamine lotion can also relieve mild symptoms.    Use oral diphenhydramine to help reduce itching. You can buy this antihistamine at drug and grocery stores. It can make you sleepy, so use lower doses during the daytime. Or you can use loratadine. This is an antihistamine that will not make you sleepy. Do not use diphenhydramine if you have glaucoma or have trouble urinating due to an enlarged prostate.    If a plant causes your rash, make sure to wash your skin and the clothes you were wearing when you came into contact with the plant. This is to wash away the plant oils that gave you the rash and prevent more or worse symptoms.    Stay away from the substance or object that causes your symptoms. If you can t avoid it, wear gloves or some other type of protection.  Follow-up care  Follow up with your healthcare provider, or as advised.  When to seek medical advice  Call your healthcare provider right away if any of these occur:    Spreading of the rash to other parts of your body    Severe swelling of your face, eyelids, mouth, throat or tongue    Trouble urinating due to swelling in the genital area    Fever of 100.4 F (38 C) or higher    Redness or swelling that gets worse    Pain that gets worse    Foul-smelling fluid leaking from the skin    Yellow-brown crusts on the open blisters

## 2019-05-09 ENCOUNTER — HOSPITAL ENCOUNTER (OUTPATIENT)
Dept: ULTRASOUND IMAGING | Facility: OTHER | Age: 24
Discharge: HOME | End: 2019-05-09
Attending: OBSTETRICS & GYNECOLOGY | Admitting: OBSTETRICS & GYNECOLOGY
Payer: COMMERCIAL

## 2019-05-09 ENCOUNTER — PRENATAL OFFICE VISIT (OUTPATIENT)
Dept: OBGYN | Facility: OTHER | Age: 24
End: 2019-05-09
Attending: OBSTETRICS & GYNECOLOGY
Payer: COMMERCIAL

## 2019-05-09 VITALS
SYSTOLIC BLOOD PRESSURE: 112 MMHG | HEART RATE: 92 BPM | BODY MASS INDEX: 27.31 KG/M2 | WEIGHT: 164.1 LBS | DIASTOLIC BLOOD PRESSURE: 74 MMHG

## 2019-05-09 DIAGNOSIS — O44.42 LOW LYING PLACENTA NOS OR WITHOUT HEMORRHAGE, SECOND TRIMESTER: ICD-10-CM

## 2019-05-09 DIAGNOSIS — Z34.92 NORMAL PREGNANCY IN SECOND TRIMESTER: ICD-10-CM

## 2019-05-09 DIAGNOSIS — Z34.92 NORMAL PREGNANCY IN SECOND TRIMESTER: Primary | ICD-10-CM

## 2019-05-09 PROCEDURE — 99207 ZZC OB VISIT-NO CHARGE - GICH ONLY: CPT | Performed by: OBSTETRICS & GYNECOLOGY

## 2019-05-09 PROCEDURE — 76805 OB US >/= 14 WKS SNGL FETUS: CPT

## 2019-05-09 ASSESSMENT — PAIN SCALES - GENERAL: PAINLEVEL: NO PAIN (0)

## 2019-05-09 NOTE — PROGRESS NOTES
CC: Recheck OB visit at 20w0d    HPI: Nicky Hayes presents for a routine OB visit now at 20w0d  She has no concerns. Denies cramping, bleeding, normal fetal movement.    OB History    Para Term  AB Living   2 1 1 0 0 1   SAB TAB Ectopic Multiple Live Births   0 0 0 0 1      # Outcome Date GA Lbr Surinder/2nd Weight Sex Delivery Anes PTL Lv   2 Current            1 Term 04/10/17 40w0d  3.289 kg (7 lb 4 oz) M IVD EPI N NINA     Current Outpatient Medications   Medication     EPINEPHrine (EPIPEN/ADRENACLICK/OR ANY BX GENERIC EQUIV) 0.3 MG/0.3ML injection 2-pack     Prenatal Vit-Fe Fumarate-FA (PRENATAL VITAMIN) 27-0.8 MG TABS     triamcinolone (KENALOG) 0.1 % external ointment     No current facility-administered medications for this visit.          O: /74 (BP Location: Right arm)   Pulse 92   Wt 74.4 kg (164 lb 1.6 oz)   LMP 2018   BMI 27.31 kg/m    Body mass index is 27.31 kg/m .  See OB flow sheet  EXAM:  NAD  US images reviewed and will await a formal read.  Normal fetal heart rate noted.  Low lying anterior placenta.    A/P: 20w0d    Recheck in 4 weeks    Pregnancy risk factors include:    History of retained products/infection requiring admission and abx  Low lying placenta- 1.5 cm from internal os, anterior placenta, not bleeding.  Rh neg.      Jeremías Hazel MD FACOG  1:05 PM 2019

## 2019-06-10 ENCOUNTER — PRENATAL OFFICE VISIT (OUTPATIENT)
Dept: OBGYN | Facility: OTHER | Age: 24
End: 2019-06-10
Attending: OBSTETRICS & GYNECOLOGY
Payer: COMMERCIAL

## 2019-06-10 VITALS
HEART RATE: 78 BPM | SYSTOLIC BLOOD PRESSURE: 106 MMHG | WEIGHT: 171.9 LBS | BODY MASS INDEX: 28.61 KG/M2 | DIASTOLIC BLOOD PRESSURE: 66 MMHG

## 2019-06-10 DIAGNOSIS — O44.42 LOW LYING PLACENTA NOS OR WITHOUT HEMORRHAGE, SECOND TRIMESTER: ICD-10-CM

## 2019-06-10 DIAGNOSIS — Z34.90 NORMAL PREGNANCY, ANTEPARTUM: Primary | ICD-10-CM

## 2019-06-10 PROCEDURE — 99207 ZZC OB VISIT-NO CHARGE - GICH ONLY: CPT | Performed by: OBSTETRICS & GYNECOLOGY

## 2019-06-10 ASSESSMENT — PAIN SCALES - GENERAL: PAINLEVEL: NO PAIN (0)

## 2019-06-10 NOTE — PROGRESS NOTES
CC: Recheck OB visit at 24w4d    HPI: Nicky Hart presents for a routine OB visit now at 24w4d  She has no concerns. Denies cramping, bleeding, normal fetal movement.    OB History    Para Term  AB Living   2 1 1 0 0 1   SAB TAB Ectopic Multiple Live Births   0 0 0 0 1      # Outcome Date GA Lbr Surinder/2nd Weight Sex Delivery Anes PTL Lv   2 Current            1 Term 04/10/17 40w0d  3.289 kg (7 lb 4 oz) M IVD EPI N NINA     Current Outpatient Medications   Medication     EPINEPHrine (EPIPEN/ADRENACLICK/OR ANY BX GENERIC EQUIV) 0.3 MG/0.3ML injection 2-pack     Prenatal Vit-Fe Fumarate-FA (PRENATAL VITAMIN) 27-0.8 MG TABS     triamcinolone (KENALOG) 0.1 % external ointment     No current facility-administered medications for this visit.          O: /66 (BP Location: Right arm)   Pulse 78   Wt 78 kg (171 lb 14.4 oz)   LMP 2018   BMI 28.61 kg/m    Body mass index is 28.61 kg/m .  See OB flow sheet  EXAM:  NAD  FH 25 cm  FHT's: 140's    Results for orders placed or performed during the hospital encounter of 19   US OB > 14 Weeks    Narrative    OB ULTRASOUND REPORT     Clinical history:  Normal pregnancy in second trimester     Gestation:  1  Presentation: Cephalic  Lie:  Oblique    Cardiac Activity:  Regular  BPM:  156  Movement:  Yes    Placenta: Anterior  stGstrstastdstest:st st1st Previa:  Low lying, approximately 1.1 cm from the cervical os.      Cervix:  3.5 cm in length    BHAVESH:  18.6 cm    Measurements:    BPD:  20 weeks 6 days  HC:  20 weeks 5 days  AC:  21 weeks 2 days  FL:  20 weeks 3 days    Estimated Fetal Weight:  379 grams  HC/AC:  1.13    US age:  20 weeks 6 days  Gestational Age by LMP:  20 weeks 0 days  US EDC (Current Study):  2019   %WT for EGA  (Based on First Study):  60 %    Structural Survey:    Head:  Unremarkable  Spine:  Unremarkable  Stomach:  Unremarkable  Kidneys:  Unremarkable  Bladder:  Unremarkable  3 Vessel Cord:  Unremarkable  Cord Insertion:  Unremarkable  4  Chamber Heart, RVOT, LVOT:  Unremarkable  Ant. Abd Wall:  Unremarkable  Diaphragm:  Unremarkable  Situs: Unremarkable          Impression    Impression: Negative fetal anatomy screening. Low-lying placenta, 1.1  cm from the cervical os.      ANALI HSIEH MD       A/P: 24w4d gestation    Recheck in 4 weeks  GCT  Recheck US for growth and placental location.      Problem List:     Low lying placenta    Jeremías Hazel MD FACOG  9:23 AM 6/10/2019

## 2019-06-25 ENCOUNTER — HOSPITAL ENCOUNTER (OUTPATIENT)
Dept: ULTRASOUND IMAGING | Facility: OTHER | Age: 24
Discharge: HOME | End: 2019-06-25
Attending: OBSTETRICS & GYNECOLOGY | Admitting: OBSTETRICS & GYNECOLOGY
Payer: COMMERCIAL

## 2019-06-25 ENCOUNTER — PRENATAL OFFICE VISIT (OUTPATIENT)
Dept: OBGYN | Facility: OTHER | Age: 24
End: 2019-06-25
Attending: OBSTETRICS & GYNECOLOGY
Payer: COMMERCIAL

## 2019-06-25 VITALS
SYSTOLIC BLOOD PRESSURE: 106 MMHG | DIASTOLIC BLOOD PRESSURE: 58 MMHG | HEART RATE: 86 BPM | BODY MASS INDEX: 29.39 KG/M2 | WEIGHT: 176.6 LBS

## 2019-06-25 DIAGNOSIS — O44.40 LOW-LYING PLACENTA: ICD-10-CM

## 2019-06-25 DIAGNOSIS — Z34.92 NORMAL PREGNANCY IN SECOND TRIMESTER: Primary | ICD-10-CM

## 2019-06-25 DIAGNOSIS — O44.42 LOW LYING PLACENTA NOS OR WITHOUT HEMORRHAGE, SECOND TRIMESTER: ICD-10-CM

## 2019-06-25 DIAGNOSIS — Z34.92 NORMAL PREGNANCY IN SECOND TRIMESTER: ICD-10-CM

## 2019-06-25 LAB
ABO + RH BLD: NORMAL
ABO + RH BLD: NORMAL
BLOOD BANK CMNT PATIENT-IMP: NORMAL
BLOOD BANK CMNT PATIENT-IMP: NORMAL
DATE RH IMM GL GVN: NORMAL
FETAL CELL SCN BLD QL ROSETTE: NORMAL
GLUCOSE 1H P 50 G GLC PO SERPL-MCNC: 88 MG/DL (ref 60–129)
HGB BLD-MCNC: 12.6 G/DL (ref 11.7–15.7)
RH IG VIALS RECOM PATIENT: NORMAL

## 2019-06-25 PROCEDURE — 82950 GLUCOSE TEST: CPT | Mod: ZL | Performed by: OBSTETRICS & GYNECOLOGY

## 2019-06-25 PROCEDURE — 76815 OB US LIMITED FETUS(S): CPT

## 2019-06-25 PROCEDURE — 90715 TDAP VACCINE 7 YRS/> IM: CPT | Performed by: OBSTETRICS & GYNECOLOGY

## 2019-06-25 PROCEDURE — 96372 THER/PROPH/DIAG INJ SC/IM: CPT | Performed by: OBSTETRICS & GYNECOLOGY

## 2019-06-25 PROCEDURE — 90471 IMMUNIZATION ADMIN: CPT | Performed by: OBSTETRICS & GYNECOLOGY

## 2019-06-25 PROCEDURE — 99207 ZZC OB VISIT-NO CHARGE - GICH ONLY: CPT | Performed by: OBSTETRICS & GYNECOLOGY

## 2019-06-25 PROCEDURE — 85461 HEMOGLOBIN FETAL: CPT | Mod: ZL | Performed by: OBSTETRICS & GYNECOLOGY

## 2019-06-25 PROCEDURE — 36415 COLL VENOUS BLD VENIPUNCTURE: CPT | Mod: ZL | Performed by: OBSTETRICS & GYNECOLOGY

## 2019-06-25 PROCEDURE — 85018 HEMOGLOBIN: CPT | Mod: ZL | Performed by: OBSTETRICS & GYNECOLOGY

## 2019-06-25 PROCEDURE — 86780 TREPONEMA PALLIDUM: CPT | Mod: ZL | Performed by: OBSTETRICS & GYNECOLOGY

## 2019-06-25 PROCEDURE — 25000128 H RX IP 250 OP 636: Performed by: OBSTETRICS & GYNECOLOGY

## 2019-06-25 PROCEDURE — 86901 BLOOD TYPING SEROLOGIC RH(D): CPT | Mod: ZL | Performed by: OBSTETRICS & GYNECOLOGY

## 2019-06-25 PROCEDURE — 86900 BLOOD TYPING SEROLOGIC ABO: CPT | Mod: ZL | Performed by: OBSTETRICS & GYNECOLOGY

## 2019-06-25 RX ADMIN — RHO(D) IMMUNE GLOBULIN (HUMAN) 300 MCG: 1500 SOLUTION INTRAMUSCULAR at 15:02

## 2019-06-25 ASSESSMENT — PAIN SCALES - GENERAL: PAINLEVEL: NO PAIN (0)

## 2019-06-25 NOTE — PROGRESS NOTES
CC: Recheck OB visit at 26w5d    HPI: Nicky Hart presents for a routine OB visit now at 26w5d  She has no concerns. Denies cramping, bleeding, normal fetal movement.    OB History    Para Term  AB Living   2 1 1 0 0 1   SAB TAB Ectopic Multiple Live Births   0 0 0 0 1      # Outcome Date GA Lbr Surinder/2nd Weight Sex Delivery Anes PTL Lv   2 Current            1 Term 04/10/17 40w0d  3.289 kg (7 lb 4 oz) M IVD EPI N NINA     Current Outpatient Medications   Medication     EPINEPHrine (EPIPEN/ADRENACLICK/OR ANY BX GENERIC EQUIV) 0.3 MG/0.3ML injection 2-pack     Prenatal Vit-Fe Fumarate-FA (PRENATAL VITAMIN) 27-0.8 MG TABS     triamcinolone (KENALOG) 0.1 % external ointment     No current facility-administered medications for this visit.          O: /58 (BP Location: Right arm)   Pulse 86   Wt 80.1 kg (176 lb 9.6 oz)   LMP 2018   BMI 29.39 kg/m    Body mass index is 29.39 kg/m .  See OB flow sheet  EXAM:  NAD  US shows placenta just over 2 cm from os  Normal growth    A/P:   Recheck in 4 weeks  US in six weeks for growth and placental location  Rhogam and tdap today.      Problem List:     Low lying placenta  Jeremías Hazel MD FACOG  2:20 PM 2019

## 2019-06-27 LAB — T PALLIDUM AB SER QL: NONREACTIVE

## 2019-07-25 ENCOUNTER — HOSPITAL ENCOUNTER (OUTPATIENT)
Dept: GENERAL RADIOLOGY | Facility: OTHER | Age: 24
Discharge: HOME | End: 2019-07-25
Attending: OBSTETRICS & GYNECOLOGY | Admitting: OBSTETRICS & GYNECOLOGY
Payer: COMMERCIAL

## 2019-07-25 ENCOUNTER — PRENATAL OFFICE VISIT (OUTPATIENT)
Dept: OBGYN | Facility: OTHER | Age: 24
End: 2019-07-25
Attending: OBSTETRICS & GYNECOLOGY
Payer: COMMERCIAL

## 2019-07-25 VITALS
WEIGHT: 180.8 LBS | SYSTOLIC BLOOD PRESSURE: 108 MMHG | HEART RATE: 78 BPM | DIASTOLIC BLOOD PRESSURE: 76 MMHG | BODY MASS INDEX: 30.09 KG/M2

## 2019-07-25 DIAGNOSIS — M79.671 RIGHT FOOT PAIN: ICD-10-CM

## 2019-07-25 DIAGNOSIS — K21.00 GASTROESOPHAGEAL REFLUX DISEASE WITH ESOPHAGITIS: ICD-10-CM

## 2019-07-25 DIAGNOSIS — F43.22 ADJUSTMENT DISORDER WITH ANXIOUS MOOD: Primary | ICD-10-CM

## 2019-07-25 PROCEDURE — 99207 ZZC OB VISIT-NO CHARGE - GICH ONLY: CPT | Performed by: OBSTETRICS & GYNECOLOGY

## 2019-07-25 PROCEDURE — 73630 X-RAY EXAM OF FOOT: CPT | Mod: RT

## 2019-07-25 PROCEDURE — 99214 OFFICE O/P EST MOD 30 MIN: CPT | Performed by: OBSTETRICS & GYNECOLOGY

## 2019-07-25 RX ORDER — FAMOTIDINE 20 MG/1
20 TABLET, FILM COATED ORAL 2 TIMES DAILY
Qty: 60 TABLET | Refills: 1 | Status: SHIPPED | OUTPATIENT
Start: 2019-07-25 | End: 2019-08-16

## 2019-07-25 RX ORDER — SERTRALINE HYDROCHLORIDE 25 MG/1
25 TABLET, FILM COATED ORAL DAILY
Qty: 90 TABLET | Refills: 1 | Status: SHIPPED | OUTPATIENT
Start: 2019-07-25 | End: 2019-08-24 | Stop reason: SINTOL

## 2019-07-25 ASSESSMENT — ANXIETY QUESTIONNAIRES
1. FEELING NERVOUS, ANXIOUS, OR ON EDGE: NEARLY EVERY DAY
IF YOU CHECKED OFF ANY PROBLEMS ON THIS QUESTIONNAIRE, HOW DIFFICULT HAVE THESE PROBLEMS MADE IT FOR YOU TO DO YOUR WORK, TAKE CARE OF THINGS AT HOME, OR GET ALONG WITH OTHER PEOPLE: SOMEWHAT DIFFICULT
6. BECOMING EASILY ANNOYED OR IRRITABLE: MORE THAN HALF THE DAYS
7. FEELING AFRAID AS IF SOMETHING AWFUL MIGHT HAPPEN: NEARLY EVERY DAY
3. WORRYING TOO MUCH ABOUT DIFFERENT THINGS: MORE THAN HALF THE DAYS
5. BEING SO RESTLESS THAT IT IS HARD TO SIT STILL: SEVERAL DAYS
GAD7 TOTAL SCORE: 16
2. NOT BEING ABLE TO STOP OR CONTROL WORRYING: NEARLY EVERY DAY

## 2019-07-25 ASSESSMENT — PAIN SCALES - GENERAL: PAINLEVEL: SEVERE PAIN (6)

## 2019-07-25 ASSESSMENT — PATIENT HEALTH QUESTIONNAIRE - PHQ9: 5. POOR APPETITE OR OVEREATING: MORE THAN HALF THE DAYS

## 2019-07-25 NOTE — LETTER
[facility]  [address]  [place] [address]  [phone]          July 25, 2019    RE:  Nicky Hart                                                                                                                                                       [address]  [place] [facility] [address]            To whom it may concern:    Nicky Hart is under my professional care for pregnancy and is due 9/26/2019        Jeremías Hazel MD FACOG  10:49 AM 7/25/2019

## 2019-07-25 NOTE — LETTER
[facility]  [address]  [place] [address]  [phone]          July 25, 2019    RE:  Nicky Hart                                                                                                                                                       [address]  [facility] [address]            To whom it may concern:    Nicky Hart is under my professional care     She has a non-work related injury and is off work until further notice.  She is scheduled to be reevaluated in two weeks.    Sincerely,      Jeremías Hazel MD FACOG  10:48 AM 7/25/2019

## 2019-07-25 NOTE — PROGRESS NOTES
CC: Recheck OB visit at 31w0d    HPI: Nicky Hart presents for a routine OB visit now at 31w0d  She has no concerns. Denies cramping, bleeding, normal fetal movement. She fell at home after tripping on a child's toy. She twisted her foot awkwardly and struggles to put weight on it today. She notices pain and swelling. She struggled a bit to depress the accelerator in her car on the way here. She also notes trouble with anxiety. She is afraid her child will be injured or die if not in her presence, which she admits is not rational. She is tearful today. She denies suicidality. She has been on Zoloft for similar symptoms in the past with some success. Home is safe and she is getting along with her spouse.    She is also noticing issues with burning in her stomach and throat especially when lying down at night with acid reflux.    OB History    Para Term  AB Living   2 1 1 0 0 1   SAB TAB Ectopic Multiple Live Births   0 0 0 0 1      # Outcome Date GA Lbr Surinder/2nd Weight Sex Delivery Anes PTL Lv   2 Current            1 Term 04/10/17 40w0d  3.289 kg (7 lb 4 oz) M IVD EPI N NINA     Current Outpatient Medications   Medication     EPINEPHrine (EPIPEN/ADRENACLICK/OR ANY BX GENERIC EQUIV) 0.3 MG/0.3ML injection 2-pack     Prenatal Vit-Fe Fumarate-FA (PRENATAL VITAMIN) 27-0.8 MG TABS     triamcinolone (KENALOG) 0.1 % external ointment     No current facility-administered medications for this visit.          O: /76 (BP Location: Right arm)   Pulse 78   Wt 82 kg (180 lb 12.8 oz)   LMP 2018   BMI 30.09 kg/m    Body mass index is 30.09 kg/m .  See OB flow sheet  EXAM:  NAD, but tearful and emotionally labile.  Right foot is mildly tender and swollen, particularly over the midfoot/mp joint area. No obvious deformity. Normal sensation. No pain with lateral or medial motion in active or passive range of motion. Some pain with superior and inferior deviation.  FH 31 cm  FHT: 140  Results for orders  placed or performed in visit on 19   XR Foot 3 Views Standing Right    Narrative    PROCEDURE:  XR FOOT 3 VIEWS STANDING RIGHT    HISTORY: right foot pain after internal rotation of the ankle; Right  foot pain    COMPARISON:  None.    TECHNIQUE:  3 views of the right foot were obtained.    FINDINGS:  No fracture or dislocation is identified. The joint spaces  are preserved.        Impression    IMPRESSION: No acute fracture.      ANALI HSIEH MD       A/P: 31w0d gestation    Recheck in 2 weeks    (F43.22) Adjustment disorder with anxious mood  (primary encounter diagnosis)  Comment:   Plan: sertraline (ZOLOFT) 25 MG tablet   Recheck in two weeks    (K21.0) Gastroesophageal reflux disease with esophagitis  Comment:   Plan: famotidine (PEPCID) 20 MG tablet            (M79.671) Right foot pain  Comment:   Plan: XR Foot 3 Views Standing Right        Recommend ICE, compression and elevation for sprain in the midfoot. Off work until recheck in two weeks.    Problem List:    Low lying placenta- recheck in two weeks by US for growth and location of placenta.    TT 30 min with over half addressing GERD, anxiety and foot pain.    Jeremías Hazel MD FACOG  10:27 AM 2019

## 2019-07-26 ASSESSMENT — ANXIETY QUESTIONNAIRES: GAD7 TOTAL SCORE: 16

## 2019-08-06 ENCOUNTER — HOSPITAL ENCOUNTER (OUTPATIENT)
Dept: ULTRASOUND IMAGING | Facility: OTHER | Age: 24
Discharge: HOME | End: 2019-08-06
Attending: OBSTETRICS & GYNECOLOGY | Admitting: OBSTETRICS & GYNECOLOGY
Payer: COMMERCIAL

## 2019-08-06 ENCOUNTER — PRENATAL OFFICE VISIT (OUTPATIENT)
Dept: OBGYN | Facility: OTHER | Age: 24
End: 2019-08-06
Attending: OBSTETRICS & GYNECOLOGY
Payer: COMMERCIAL

## 2019-08-06 VITALS
BODY MASS INDEX: 30.5 KG/M2 | HEART RATE: 82 BPM | DIASTOLIC BLOOD PRESSURE: 82 MMHG | SYSTOLIC BLOOD PRESSURE: 126 MMHG | WEIGHT: 183.3 LBS

## 2019-08-06 DIAGNOSIS — Z34.90 NORMAL PREGNANCY, ANTEPARTUM: Primary | ICD-10-CM

## 2019-08-06 DIAGNOSIS — O44.40 LOW-LYING PLACENTA: ICD-10-CM

## 2019-08-06 DIAGNOSIS — Z34.92 NORMAL PREGNANCY IN SECOND TRIMESTER: ICD-10-CM

## 2019-08-06 PROCEDURE — 99207 ZZC OB VISIT-NO CHARGE - GICH ONLY: CPT | Performed by: OBSTETRICS & GYNECOLOGY

## 2019-08-06 PROCEDURE — 76816 OB US FOLLOW-UP PER FETUS: CPT

## 2019-08-06 ASSESSMENT — PAIN SCALES - GENERAL: PAINLEVEL: NO PAIN (0)

## 2019-08-06 NOTE — PROGRESS NOTES
CC: Recheck OB visit at 32w5d    HPI: Nicky Hart presents for a routine OB visit now at 32w5d  She has no concerns. Denies cramping, bleeding, normal fetal movement.    OB History    Para Term  AB Living   2 1 1 0 0 1   SAB TAB Ectopic Multiple Live Births   0 0 0 0 1      # Outcome Date GA Lbr Surinder/2nd Weight Sex Delivery Anes PTL Lv   2 Current            1 Term 04/10/17 40w0d  3.289 kg (7 lb 4 oz) M IVD EPI N NINA     Current Outpatient Medications   Medication     EPINEPHrine (EPIPEN/ADRENACLICK/OR ANY BX GENERIC EQUIV) 0.3 MG/0.3ML injection 2-pack     famotidine (PEPCID) 20 MG tablet     Prenatal Vit-Fe Fumarate-FA (PRENATAL VITAMIN) 27-0.8 MG TABS     sertraline (ZOLOFT) 25 MG tablet     triamcinolone (KENALOG) 0.1 % external ointment     No current facility-administered medications for this visit.          O: /82 (BP Location: Right arm)   Pulse 82   Wt 83.1 kg (183 lb 4.8 oz)   LMP 2018   BMI 30.50 kg/m    Body mass index is 30.5 kg/m .  See OB flow sheet  EXAM:  NAD    US reviewed, placenta no longer low lying  Baby breech by US    A/P: 32w5d gestation    Recheck in 2 weeks  Discussed version at 36-37 weeks under spinal if baby has not spontaneously verted. Risks and benefits discussed.    Problem List:     Anterior placenta, not low lying  Breech baby      Jeremías Hazel MD FACOG  8:50 AM 2019

## 2019-08-16 DIAGNOSIS — K21.00 GASTROESOPHAGEAL REFLUX DISEASE WITH ESOPHAGITIS: ICD-10-CM

## 2019-08-19 RX ORDER — FAMOTIDINE 20 MG/1
20 TABLET, FILM COATED ORAL 2 TIMES DAILY
Qty: 60 TABLET | Refills: 1 | Status: SHIPPED | OUTPATIENT
Start: 2019-08-19 | End: 2019-09-16

## 2019-08-22 ENCOUNTER — TELEPHONE (OUTPATIENT)
Dept: OBGYN | Facility: OTHER | Age: 24
End: 2019-08-22

## 2019-08-22 DIAGNOSIS — R51.9 HEADACHE: Primary | ICD-10-CM

## 2019-08-22 RX ORDER — BUTALBITAL, ACETAMINOPHEN AND CAFFEINE 50; 325; 40 MG/1; MG/1; MG/1
1 TABLET ORAL EVERY 4 HOURS PRN
Qty: 12 TABLET | Refills: 0 | Status: SHIPPED | OUTPATIENT
Start: 2019-08-22 | End: 2019-09-04

## 2019-08-22 NOTE — TELEPHONE ENCOUNTER
Returned patient's call.  Explained Imitrex was not safe to take during pregnancy.  She reported having a headache X 3 days 7/10 pain with no relief from Tylenol.  Patient requested an Rx be sent to [facility] pharmacy.  Per ADY, faxed Fioricet -40 MG tabs to pharmacy.    Nany Acevedo RN on 8/22/2019 at 10:43 AM

## 2019-08-24 ENCOUNTER — OFFICE VISIT (OUTPATIENT)
Dept: FAMILY MEDICINE | Facility: OTHER | Age: 24
End: 2019-08-24
Attending: FAMILY MEDICINE
Payer: COMMERCIAL

## 2019-08-24 VITALS
RESPIRATION RATE: 20 BRPM | HEIGHT: 65 IN | WEIGHT: 189.3 LBS | DIASTOLIC BLOOD PRESSURE: 74 MMHG | HEART RATE: 77 BPM | OXYGEN SATURATION: 98 % | TEMPERATURE: 98.5 F | SYSTOLIC BLOOD PRESSURE: 118 MMHG | BODY MASS INDEX: 31.54 KG/M2

## 2019-08-24 DIAGNOSIS — Z71.1 PHYSICALLY WELL BUT WORRIED: Primary | ICD-10-CM

## 2019-08-24 PROCEDURE — 99213 OFFICE O/P EST LOW 20 MIN: CPT | Performed by: FAMILY MEDICINE

## 2019-08-24 ASSESSMENT — PAIN SCALES - GENERAL: PAINLEVEL: NO PAIN (0)

## 2019-08-24 ASSESSMENT — MIFFLIN-ST. JEOR: SCORE: 1619.54

## 2019-08-24 NOTE — NURSING NOTE
Patient in clinic for urinary/vaginal problem x 2 weeks. Patient visualized with mirror this am and it doesn't look normal. Like something is falling out.    Anayeli Keenan LPN LPN....................  8/24/2019   1:21 PM    Chief Complaint   Patient presents with     Urinary Problem     leaking urine     Vaginal Problem     extra pressure       Medication Reconciliation: complete    Anayeli Keenan LPN

## 2019-08-24 NOTE — PROGRESS NOTES
"Nursing Notes:   Anayeli Keenan LPN  2019  1:22 PM  Sign at exiting of workspace  Patient in clinic for urinary/vaginal problem x 2 weeks. Patient visualized with mirror this am and it doesn't look normal. Like something is falling out.    Anayeli Keenan LPN LPN....................  2019   1:21 PM    Chief Complaint   Patient presents with     Urinary Problem     leaking urine     Vaginal Problem     extra pressure       Medication Reconciliation: complete    Anayeli Kenean LPN    SUBJECTIVE:   Nicky Hart is a 24 year old female who presents to clinic today for the following health issues:    HPI  Nicky is here for vaginal concerns.  She is 35w2d pregnant with second child and has had some issues with this pregnancy including low-lying placenta, breech baby - however she believes in the last few days she has \"flipped\"  She denies any abnormal discharge, fluid leaking, bleeding/spotting.  + fetal movement.  No contractions.  She is primarily here because of the amount of pressure she feels in her pelvis and because something looks \"abnormal\" like it is coming out of her.   She has had some urinary incontinence/leaking with this pregnancy; nothing has worsened.  No pain with associated symptoms.  Doesn't have to \"push anything back in.\"    Patient Active Problem List    Diagnosis Date Noted     Allergic rhinitis due to pollen 2018     Priority: Medium     Overview:   Seasonal allergy symptoms.       Subacute endomyometritis 2017     Priority: Medium     Depression with anxiety 2013     Priority: Medium     Allergic state 2011     Priority: Medium     Dysmenorrhea 06/10/2011     Priority: Medium     Idiopathic scoliosis 06/10/2011     Priority: Medium     Past Medical History:   Diagnosis Date     Chronic cough 2002    resolved     Closed fracture of lower end of radius 2006    L arm     History of early menarche 2007     Pregnancy           Past Surgical " History:   Procedure Laterality Date     DILATION AND CURETTAGE N/A 05/17/2017    Dr. Hazel; [facility] - retained placenta/endometritis     Family History   Problem Relation Age of Onset     GERD Mother      GERD Maternal Grandmother      Ulcerative Colitis Maternal Grandfather      Social History     Tobacco Use     Smoking status: Never Smoker     Smokeless tobacco: Never Used   Substance Use Topics     Alcohol use: No     Alcohol/week: 0.0 oz     Frequency: Never     Comment: Alcoholic Drinks/day: rare     Social History     Social History Narrative    Attends [employer] fall 2014.     Doni Mathew-stepfather. Now  from her mother and out of the home.    Minimal contact with her biological father in [place].     Kiley Hayes Mother    Only child    Worked at [employer] in [employer]; now at [employer].     Current Outpatient Medications   Medication Sig Dispense Refill     EPINEPHrine (EPIPEN/ADRENACLICK/OR ANY BX GENERIC EQUIV) 0.3 MG/0.3ML injection 2-pack Inject 0.3 mg into the muscle once as needed for allergic reaction for up to 1 dose.       famotidine (PEPCID) 20 MG tablet TAKE 1 TABLET (20 MG) BY MOUTH 2 TIMES DAILY 60 tablet 1     Prenatal Vit-Fe Fumarate-FA (PRENATAL VITAMIN) 27-0.8 MG TABS Take 1 tablet by mouth daily 100 tablet 3     triamcinolone (KENALOG) 0.1 % external ointment Apply topically 2 times daily For up to 10 days 60 g 0     butalbital-acetaminophen-caffeine (FIORICET/ESGIC) -40 MG tablet Take 1 tablet by mouth every 4 hours as needed for headaches (Patient not taking: Reported on 8/24/2019) 12 tablet 0     Allergies   Allergen Reactions     Bermuda Grass Itching     Dust Mite Extract Itching     Mold Itching     Pollen Extract Itching     Tree Nuts  [Nuts] Itching     Review of Systems   Constitutional: Negative for activity change, appetite change, diaphoresis, fatigue and fever.   Genitourinary: Negative for difficulty urinating, dyspareunia, dysuria, genital sores and  "hematuria.   All other systems reviewed and are negative.     OBJECTIVE:     /74 (BP Location: Right arm, Patient Position: Sitting, Cuff Size: Adult Large)   Pulse 77   Temp 98.5  F (36.9  C) (Tympanic)   Resp 20   Ht 1.651 m (5' 5\")   Wt 85.9 kg (189 lb 4.8 oz)   LMP 12/11/2018   SpO2 98%   Breastfeeding? No   BMI 31.50 kg/m    Body mass index is 31.5 kg/m .  Physical Exam   Constitutional: She appears well-developed and well-nourished.   HENT:   Head: Normocephalic and atraumatic.   Cardiovascular: Normal rate and intact distal pulses.   Pulmonary/Chest: Effort normal and breath sounds normal.   Abdominal: Hernia confirmed negative in the right inguinal area and confirmed negative in the left inguinal area.   Genitourinary: Pelvic exam was performed with patient prone. There is no rash, tenderness, lesion or injury on the right labia. There is no rash, tenderness, lesion or injury on the left labia.   Genitourinary Comments: Residual hymenal tissue/tag appreciated at 6:00, appearing more swollen than likely expected normal - explained that this can happen toward the end of pregnancy.  She also has normal vaginal tissue at the introitus.  No swabs were obtained, as she has appointment this coming week and is outside of the window for FFN.  Cx exam: thick, high - unable to reach fully.  Head palpable in pelvis - will be confirming position at appointment this week.   Lymphadenopathy: No inguinal adenopathy noted on the right or left side.   Vitals reviewed.    Diagnostic Test Results:  none     ASSESSMENT/PLAN:     1. Physically well but worried  Reassurance of vaginal changes consistent with end of pregnancy.  She may request removal of hymenal remnant at time of delivery.    Arpita Carrington, [facility]  "

## 2019-08-25 ASSESSMENT — ENCOUNTER SYMPTOMS
DYSURIA: 0
APPETITE CHANGE: 0
DIFFICULTY URINATING: 0
FATIGUE: 0
HEMATURIA: 0
FEVER: 0
DIAPHORESIS: 0
ACTIVITY CHANGE: 0

## 2019-08-26 ENCOUNTER — PRENATAL OFFICE VISIT (OUTPATIENT)
Dept: OBGYN | Facility: OTHER | Age: 24
End: 2019-08-26
Attending: OBSTETRICS & GYNECOLOGY
Payer: COMMERCIAL

## 2019-08-26 VITALS
HEART RATE: 78 BPM | SYSTOLIC BLOOD PRESSURE: 122 MMHG | WEIGHT: 189.4 LBS | DIASTOLIC BLOOD PRESSURE: 80 MMHG | BODY MASS INDEX: 31.52 KG/M2

## 2019-08-26 DIAGNOSIS — Z34.93 NORMAL PREGNANCY IN THIRD TRIMESTER: Primary | ICD-10-CM

## 2019-08-26 PROCEDURE — 99207 ZZC OB VISIT-NO CHARGE - GICH ONLY: CPT | Performed by: OBSTETRICS & GYNECOLOGY

## 2019-08-26 PROCEDURE — 87081 CULTURE SCREEN ONLY: CPT | Mod: ZL | Performed by: OBSTETRICS & GYNECOLOGY

## 2019-08-26 ASSESSMENT — PAIN SCALES - GENERAL: PAINLEVEL: MILD PAIN (3)

## 2019-08-26 NOTE — NURSING NOTE
Chief Complaint   Patient presents with     Prenatal Care     35w 4d     Would like to discuss some things. Feels like baby has flipped. Feels that hiccups are down lower.     Medication Reconciliation: complete    Joanna Lira, LPN

## 2019-08-26 NOTE — PROGRESS NOTES
CC: Recheck OB visit at 35w4d    HPI: Nicky Hart presents for a routine OB visit now at 35w4d  She has no concerns. Denies cramping, bleeding, normal fetal movement. She believes the baby has flipped. She has concerns about some tissue she saw in the mirror on her vulva.    OB History    Para Term  AB Living   2 1 1 0 0 1   SAB TAB Ectopic Multiple Live Births   0 0 0 0 1      # Outcome Date GA Lbr Surinder/2nd Weight Sex Delivery Anes PTL Lv   2 Current            1 Term 04/10/17 40w0d  3.289 kg (7 lb 4 oz) M IVD EPI N NINA     Current Outpatient Medications   Medication     EPINEPHrine (EPIPEN/ADRENACLICK/OR ANY BX GENERIC EQUIV) 0.3 MG/0.3ML injection 2-pack     famotidine (PEPCID) 20 MG tablet     Prenatal Vit-Fe Fumarate-FA (PRENATAL VITAMIN) 27-0.8 MG TABS     triamcinolone (KENALOG) 0.1 % external ointment     butalbital-acetaminophen-caffeine (FIORICET/ESGIC) -40 MG tablet     No current facility-administered medications for this visit.          O: /80 (BP Location: Right arm, Patient Position: Sitting, Cuff Size: Adult Regular)   Pulse 78   Wt 85.9 kg (189 lb 6.4 oz)   LMP 2018   Breastfeeding? No   BMI 31.52 kg/m    Body mass index is 31.52 kg/m .  See OB flow sheet  EXAM:  NAD  Baby is now cephalic by bedside US.  Cx 50/-2  Normal vulva with posterior hymenal tag and cystourethrocele noted.    Results for orders placed or performed during the hospital encounter of 19   US OB >14 Weeks Follow Up    Narrative    OB ULTRASOUND REPORT     Clinical history:  Normal pregnancy in second trimester; Low-lying  placenta     Comparison: 2019, 2019    Gestation:  1  Presentation: Breech  Lie:  Longitudinal    Cardiac Activity:  Regular  BPM:  149  Movement:  Yes    Placenta: Anterior  ndGndrndanddndend:nd nd2nd Previa:  No previa. The placental margin is now 3.6 cm from the  cervical os.      Cervix:  4.3 cm in length    BHAVESH:  15.4 cm    Measurements:    BPD:  32 weeks 3  days  HC:  34 weeks 2 days  AC:  32 weeks 4 days  FL:  32 weeks 4 days    Estimated Fetal Weight:  2026 grams  HC/AC:  1.07    US age:  33 weeks 0 days  Gestational Age by LMP:  32 weeks 5 days  US EDC (Current Study):  2016   %WT for EGA  (Based on First Study):  57 %          Impression    Impression:   1. No placental previa or low-lying placenta. The placental margin is  now 3.6 cm from the cervical os.  2. Interval fetal growth. Estimated fetal weight is 2026 g which is at  the 57th percentile for gestational age.      ANALI HSIEH MD       A/P:(Z34.93) Normal pregnancy in third trimester  (primary encounter diagnosis)  Comment:   Plan: Rectal/Vag Group B Strep Culture              Recheck in 1 weeks      Problem List:   Problem List:     Anterior placenta, not low lying         Jeremías Hazel MD FACOG  8:38 AM 2019

## 2019-08-28 LAB
BACTERIA SPEC CULT: NORMAL
SPECIMEN SOURCE: NORMAL

## 2019-09-04 ENCOUNTER — PRENATAL OFFICE VISIT (OUTPATIENT)
Dept: OBGYN | Facility: OTHER | Age: 24
End: 2019-09-04
Attending: OBSTETRICS & GYNECOLOGY
Payer: COMMERCIAL

## 2019-09-04 VITALS
DIASTOLIC BLOOD PRESSURE: 80 MMHG | SYSTOLIC BLOOD PRESSURE: 122 MMHG | HEART RATE: 84 BPM | WEIGHT: 192 LBS | BODY MASS INDEX: 31.95 KG/M2

## 2019-09-04 DIAGNOSIS — Z3A.36 36 WEEKS GESTATION OF PREGNANCY: Primary | ICD-10-CM

## 2019-09-04 PROCEDURE — 99207 ZZC OB VISIT-NO CHARGE - GICH ONLY: CPT | Performed by: OBSTETRICS & GYNECOLOGY

## 2019-09-04 ASSESSMENT — PAIN SCALES - GENERAL: PAINLEVEL: NO PAIN (0)

## 2019-09-04 NOTE — NURSING NOTE
Chief Complaint   Patient presents with     Prenatal Care     36w6d        Medication Reconciliation: completed   Ambika Agudelo LPN  9/4/2019 2:17 PM

## 2019-09-04 NOTE — PROGRESS NOTES
Seen for DAB  Overall doing well  Fetal activity good.  No significant ctx    No change to PL or plan

## 2019-09-06 ENCOUNTER — ANESTHESIA EVENT (OUTPATIENT)
Dept: OBGYN | Facility: OTHER | Age: 24
End: 2019-09-06
Payer: COMMERCIAL

## 2019-09-06 ENCOUNTER — ANESTHESIA (OUTPATIENT)
Dept: OBGYN | Facility: OTHER | Age: 24
End: 2019-09-06
Payer: COMMERCIAL

## 2019-09-06 ENCOUNTER — HOSPITAL ENCOUNTER (INPATIENT)
Facility: OTHER | Age: 24
LOS: 3 days | Discharge: HOME | End: 2019-09-09
Attending: OBSTETRICS & GYNECOLOGY | Admitting: OBSTETRICS & GYNECOLOGY
Payer: COMMERCIAL

## 2019-09-06 PROBLEM — O14.90 PREECLAMPSIA: Status: ACTIVE | Noted: 2019-09-06

## 2019-09-06 PROBLEM — N93.9 VAGINAL BLEEDING: Status: ACTIVE | Noted: 2019-09-06

## 2019-09-06 LAB
ALBUMIN MFR UR ELPH: 430 MG/DL (ref 1–14)
ALBUMIN UR-MCNC: 100 MG/DL
ALT SERPL W P-5'-P-CCNC: 19 U/L (ref 7–52)
ALT SERPL W P-5'-P-CCNC: 22 U/L (ref 7–52)
APPEARANCE UR: CLEAR
APTT PPP: 25 SEC (ref 22–37)
AST SERPL W P-5'-P-CCNC: 19 U/L (ref 13–39)
AST SERPL W P-5'-P-CCNC: 21 U/L (ref 13–39)
BACTERIA #/AREA URNS HPF: ABNORMAL /HPF
BILIRUB UR QL STRIP: ABNORMAL
COLOR UR AUTO: YELLOW
CREAT SERPL-MCNC: 0.63 MG/DL (ref 0.6–1.2)
CREAT SERPL-MCNC: 0.7 MG/DL (ref 0.6–1.2)
CREAT UR-MCNC: 146 MG/DL
ERYTHROCYTE [DISTWIDTH] IN BLOOD BY AUTOMATED COUNT: 12.8 % (ref 10–15)
ERYTHROCYTE [DISTWIDTH] IN BLOOD BY AUTOMATED COUNT: 12.8 % (ref 10–15)
FIBRINOGEN PPP-MCNC: 489 MG/DL (ref 170–440)
GFR SERPL CREATININE-BSD FRML MDRD: >90 ML/MIN/{1.73_M2}
GFR SERPL CREATININE-BSD FRML MDRD: >90 ML/MIN/{1.73_M2}
GLUCOSE UR STRIP-MCNC: NEGATIVE MG/DL
HCT VFR BLD AUTO: 32.9 % (ref 35–47)
HCT VFR BLD AUTO: 36.1 % (ref 35–47)
HGB BLD-MCNC: 11 G/DL (ref 11.7–15.7)
HGB BLD-MCNC: 11.9 G/DL (ref 11.7–15.7)
HGB UR QL STRIP: ABNORMAL
INR PPP: 0.94 (ref 0–1.3)
KETONES UR STRIP-MCNC: NEGATIVE MG/DL
LEUKOCYTE ESTERASE UR QL STRIP: NEGATIVE
MCH RBC QN AUTO: 28.5 PG (ref 26.5–33)
MCH RBC QN AUTO: 28.9 PG (ref 26.5–33)
MCHC RBC AUTO-ENTMCNC: 33 G/DL (ref 31.5–36.5)
MCHC RBC AUTO-ENTMCNC: 33.4 G/DL (ref 31.5–36.5)
MCV RBC AUTO: 87 FL (ref 78–100)
MCV RBC AUTO: 87 FL (ref 78–100)
NITRATE UR QL: NEGATIVE
NON-SQ EPI CELLS #/AREA URNS LPF: ABNORMAL /LPF
PH UR STRIP: 6.5 PH (ref 5–9)
PLATELET # BLD AUTO: 269 10E9/L (ref 150–450)
PLATELET # BLD AUTO: 288 10E9/L (ref 150–450)
PROT/CREAT 24H UR: 2.95 MG/G{CREAT}
RBC # BLD AUTO: 3.8 10E12/L (ref 3.8–5.2)
RBC # BLD AUTO: 4.17 10E12/L (ref 3.8–5.2)
RBC #/AREA URNS AUTO: ABNORMAL /HPF
SOURCE: ABNORMAL
SP GR UR STRIP: >1.03 (ref 1–1.03)
UROBILINOGEN UR STRIP-ACNC: 0.2 EU/DL (ref 0.2–1)
WBC # BLD AUTO: 12.4 10E9/L (ref 4–11)
WBC # BLD AUTO: 15.3 10E9/L (ref 4–11)
WBC #/AREA URNS AUTO: ABNORMAL /HPF

## 2019-09-06 PROCEDURE — 85730 THROMBOPLASTIN TIME PARTIAL: CPT | Performed by: OBSTETRICS & GYNECOLOGY

## 2019-09-06 PROCEDURE — 25800030 ZZH RX IP 258 OP 636: Performed by: OBSTETRICS & GYNECOLOGY

## 2019-09-06 PROCEDURE — 25000125 ZZHC RX 250: Performed by: OBSTETRICS & GYNECOLOGY

## 2019-09-06 PROCEDURE — 0W3R7ZZ CONTROL BLEEDING IN GENITOURINARY TRACT, VIA NATURAL OR ARTIFICIAL OPENING: ICD-10-PCS | Performed by: OBSTETRICS & GYNECOLOGY

## 2019-09-06 PROCEDURE — 85027 COMPLETE CBC AUTOMATED: CPT | Performed by: OBSTETRICS & GYNECOLOGY

## 2019-09-06 PROCEDURE — 0HQ9XZZ REPAIR PERINEUM SKIN, EXTERNAL APPROACH: ICD-10-PCS | Performed by: OBSTETRICS & GYNECOLOGY

## 2019-09-06 PROCEDURE — 12000000 ZZH R&B MED SURG/OB

## 2019-09-06 PROCEDURE — 25000132 ZZH RX MED GY IP 250 OP 250 PS 637: Performed by: OBSTETRICS & GYNECOLOGY

## 2019-09-06 PROCEDURE — 85384 FIBRINOGEN ACTIVITY: CPT | Performed by: OBSTETRICS & GYNECOLOGY

## 2019-09-06 PROCEDURE — 84450 TRANSFERASE (AST) (SGOT): CPT | Performed by: OBSTETRICS & GYNECOLOGY

## 2019-09-06 PROCEDURE — 37000011 ZZH ANESTHESIA WARD SERVICE

## 2019-09-06 PROCEDURE — G0463 HOSPITAL OUTPT CLINIC VISIT: HCPCS

## 2019-09-06 PROCEDURE — 59400 OBSTETRICAL CARE: CPT | Performed by: OBSTETRICS & GYNECOLOGY

## 2019-09-06 PROCEDURE — 86870 RBC ANTIBODY IDENTIFICATION: CPT | Performed by: OBSTETRICS & GYNECOLOGY

## 2019-09-06 PROCEDURE — 86922 COMPATIBILITY TEST ANTIGLOB: CPT | Performed by: OBSTETRICS & GYNECOLOGY

## 2019-09-06 PROCEDURE — 25000128 H RX IP 250 OP 636: Performed by: OBSTETRICS & GYNECOLOGY

## 2019-09-06 PROCEDURE — 85610 PROTHROMBIN TIME: CPT | Performed by: OBSTETRICS & GYNECOLOGY

## 2019-09-06 PROCEDURE — 82565 ASSAY OF CREATININE: CPT | Performed by: OBSTETRICS & GYNECOLOGY

## 2019-09-06 PROCEDURE — P9016 RBC LEUKOCYTES REDUCED: HCPCS | Performed by: OBSTETRICS & GYNECOLOGY

## 2019-09-06 PROCEDURE — 36415 COLL VENOUS BLD VENIPUNCTURE: CPT | Performed by: OBSTETRICS & GYNECOLOGY

## 2019-09-06 PROCEDURE — 86780 TREPONEMA PALLIDUM: CPT | Performed by: OBSTETRICS & GYNECOLOGY

## 2019-09-06 PROCEDURE — 81001 URINALYSIS AUTO W/SCOPE: CPT | Performed by: OBSTETRICS & GYNECOLOGY

## 2019-09-06 PROCEDURE — 86850 RBC ANTIBODY SCREEN: CPT | Performed by: OBSTETRICS & GYNECOLOGY

## 2019-09-06 PROCEDURE — 84156 ASSAY OF PROTEIN URINE: CPT | Performed by: OBSTETRICS & GYNECOLOGY

## 2019-09-06 PROCEDURE — 84460 ALANINE AMINO (ALT) (SGPT): CPT | Performed by: OBSTETRICS & GYNECOLOGY

## 2019-09-06 PROCEDURE — 86900 BLOOD TYPING SEROLOGIC ABO: CPT | Performed by: OBSTETRICS & GYNECOLOGY

## 2019-09-06 PROCEDURE — 25000128 H RX IP 250 OP 636: Performed by: NURSE ANESTHETIST, CERTIFIED REGISTERED

## 2019-09-06 PROCEDURE — 25800030 ZZH RX IP 258 OP 636: Performed by: NURSE ANESTHETIST, CERTIFIED REGISTERED

## 2019-09-06 PROCEDURE — 25000125 ZZHC RX 250: Performed by: NURSE ANESTHETIST, CERTIFIED REGISTERED

## 2019-09-06 PROCEDURE — 86901 BLOOD TYPING SEROLOGIC RH(D): CPT | Performed by: OBSTETRICS & GYNECOLOGY

## 2019-09-06 PROCEDURE — 10907ZC DRAINAGE OF AMNIOTIC FLUID, THERAPEUTIC FROM PRODUCTS OF CONCEPTION, VIA NATURAL OR ARTIFICIAL OPENING: ICD-10-PCS | Performed by: OBSTETRICS & GYNECOLOGY

## 2019-09-06 RX ORDER — ONDANSETRON 2 MG/ML
4 INJECTION INTRAMUSCULAR; INTRAVENOUS EVERY 6 HOURS PRN
Status: DISCONTINUED | OUTPATIENT
Start: 2019-09-06 | End: 2019-09-06

## 2019-09-06 RX ORDER — METHYLERGONOVINE MALEATE 0.2 MG/ML
200 INJECTION INTRAVENOUS
Status: DISCONTINUED | OUTPATIENT
Start: 2019-09-06 | End: 2019-09-06

## 2019-09-06 RX ORDER — NALOXONE HYDROCHLORIDE 0.4 MG/ML
.1-.4 INJECTION, SOLUTION INTRAMUSCULAR; INTRAVENOUS; SUBCUTANEOUS
Status: DISCONTINUED | OUTPATIENT
Start: 2019-09-06 | End: 2019-09-06

## 2019-09-06 RX ORDER — AMOXICILLIN 250 MG
1 CAPSULE ORAL 2 TIMES DAILY
Status: DISCONTINUED | OUTPATIENT
Start: 2019-09-06 | End: 2019-09-09 | Stop reason: HOSPADM

## 2019-09-06 RX ORDER — FENTANYL CITRATE 50 UG/ML
100 INJECTION, SOLUTION INTRAMUSCULAR; INTRAVENOUS ONCE
Status: COMPLETED | OUTPATIENT
Start: 2019-09-06 | End: 2019-09-06

## 2019-09-06 RX ORDER — IBUPROFEN 400 MG/1
800 TABLET, FILM COATED ORAL
Status: DISCONTINUED | OUTPATIENT
Start: 2019-09-06 | End: 2019-09-06

## 2019-09-06 RX ORDER — ACETAMINOPHEN 325 MG/1
650 TABLET ORAL EVERY 4 HOURS PRN
Status: DISCONTINUED | OUTPATIENT
Start: 2019-09-06 | End: 2019-09-06

## 2019-09-06 RX ORDER — TERBUTALINE SULFATE 1 MG/ML
0.25 INJECTION, SOLUTION SUBCUTANEOUS
Status: DISCONTINUED | OUTPATIENT
Start: 2019-09-06 | End: 2019-09-06

## 2019-09-06 RX ORDER — ROPIVACAINE HYDROCHLORIDE 2 MG/ML
INJECTION, SOLUTION EPIDURAL; INFILTRATION; PERINEURAL PRN
Status: DISCONTINUED | OUTPATIENT
Start: 2019-09-06 | End: 2019-09-06

## 2019-09-06 RX ORDER — ONDANSETRON 2 MG/ML
4 INJECTION INTRAMUSCULAR; INTRAVENOUS EVERY 6 HOURS PRN
Status: DISCONTINUED | OUTPATIENT
Start: 2019-09-06 | End: 2019-09-09 | Stop reason: HOSPADM

## 2019-09-06 RX ORDER — OXYCODONE AND ACETAMINOPHEN 5; 325 MG/1; MG/1
1 TABLET ORAL
Status: DISCONTINUED | OUTPATIENT
Start: 2019-09-06 | End: 2019-09-06

## 2019-09-06 RX ORDER — SODIUM CHLORIDE, SODIUM LACTATE, POTASSIUM CHLORIDE, CALCIUM CHLORIDE 600; 310; 30; 20 MG/100ML; MG/100ML; MG/100ML; MG/100ML
INJECTION, SOLUTION INTRAVENOUS CONTINUOUS
Status: DISCONTINUED | OUTPATIENT
Start: 2019-09-06 | End: 2019-09-06

## 2019-09-06 RX ORDER — LANOLIN 100 %
OINTMENT (GRAM) TOPICAL
Status: DISCONTINUED | OUTPATIENT
Start: 2019-09-06 | End: 2019-09-09 | Stop reason: HOSPADM

## 2019-09-06 RX ORDER — BISACODYL 10 MG
10 SUPPOSITORY, RECTAL RECTAL DAILY PRN
Status: DISCONTINUED | OUTPATIENT
Start: 2019-09-08 | End: 2019-09-09 | Stop reason: HOSPADM

## 2019-09-06 RX ORDER — HYDROCORTISONE 2.5 %
CREAM (GRAM) TOPICAL 3 TIMES DAILY PRN
Status: DISCONTINUED | OUTPATIENT
Start: 2019-09-06 | End: 2019-09-09 | Stop reason: HOSPADM

## 2019-09-06 RX ORDER — ACETAMINOPHEN 325 MG/1
650 TABLET ORAL EVERY 4 HOURS PRN
Status: DISCONTINUED | OUTPATIENT
Start: 2019-09-06 | End: 2019-09-09 | Stop reason: HOSPADM

## 2019-09-06 RX ORDER — OXYTOCIN 10 [USP'U]/ML
10 INJECTION, SOLUTION INTRAMUSCULAR; INTRAVENOUS
Status: DISCONTINUED | OUTPATIENT
Start: 2019-09-06 | End: 2019-09-06

## 2019-09-06 RX ORDER — LIDOCAINE 40 MG/G
CREAM TOPICAL
Status: DISCONTINUED | OUTPATIENT
Start: 2019-09-06 | End: 2019-09-06

## 2019-09-06 RX ORDER — HYDROCODONE BITARTRATE AND ACETAMINOPHEN 5; 325 MG/1; MG/1
1 TABLET ORAL EVERY 4 HOURS PRN
Status: DISCONTINUED | OUTPATIENT
Start: 2019-09-06 | End: 2019-09-07

## 2019-09-06 RX ORDER — AMOXICILLIN 250 MG
2 CAPSULE ORAL 2 TIMES DAILY
Status: DISCONTINUED | OUTPATIENT
Start: 2019-09-06 | End: 2019-09-09 | Stop reason: HOSPADM

## 2019-09-06 RX ORDER — PHENYLEPHRINE HCL IN 0.9% NACL 1 MG/10 ML
100 SYRINGE (ML) INTRAVENOUS EVERY 5 MIN PRN
Status: DISCONTINUED | OUTPATIENT
Start: 2019-09-06 | End: 2019-09-06

## 2019-09-06 RX ORDER — HYDROMORPHONE HYDROCHLORIDE 1 MG/ML
.3-.5 INJECTION, SOLUTION INTRAMUSCULAR; INTRAVENOUS; SUBCUTANEOUS
Status: DISCONTINUED | OUTPATIENT
Start: 2019-09-06 | End: 2019-09-09 | Stop reason: HOSPADM

## 2019-09-06 RX ORDER — LIDOCAINE HYDROCHLORIDE AND EPINEPHRINE 15; 5 MG/ML; UG/ML
INJECTION, SOLUTION EPIDURAL PRN
Status: DISCONTINUED | OUTPATIENT
Start: 2019-09-06 | End: 2019-09-06

## 2019-09-06 RX ORDER — NALOXONE HYDROCHLORIDE 0.4 MG/ML
.1-.4 INJECTION, SOLUTION INTRAMUSCULAR; INTRAVENOUS; SUBCUTANEOUS
Status: DISCONTINUED | OUTPATIENT
Start: 2019-09-06 | End: 2019-09-09 | Stop reason: HOSPADM

## 2019-09-06 RX ORDER — CEFAZOLIN SODIUM 1 G/50ML
1 INJECTION, SOLUTION INTRAVENOUS EVERY 8 HOURS
Status: DISCONTINUED | OUTPATIENT
Start: 2019-09-06 | End: 2019-09-07 | Stop reason: CLARIF

## 2019-09-06 RX ORDER — IBUPROFEN 400 MG/1
800 TABLET, FILM COATED ORAL EVERY 6 HOURS PRN
Status: DISCONTINUED | OUTPATIENT
Start: 2019-09-06 | End: 2019-09-09 | Stop reason: HOSPADM

## 2019-09-06 RX ORDER — LIDOCAINE HYDROCHLORIDE 10 MG/ML
INJECTION, SOLUTION INFILTRATION; PERINEURAL PRN
Status: DISCONTINUED | OUTPATIENT
Start: 2019-09-06 | End: 2019-09-06

## 2019-09-06 RX ORDER — NALBUPHINE HYDROCHLORIDE 10 MG/ML
2.5-5 INJECTION, SOLUTION INTRAMUSCULAR; INTRAVENOUS; SUBCUTANEOUS EVERY 6 HOURS PRN
Status: DISCONTINUED | OUTPATIENT
Start: 2019-09-06 | End: 2019-09-06

## 2019-09-06 RX ORDER — CARBOPROST TROMETHAMINE 250 UG/ML
250 INJECTION, SOLUTION INTRAMUSCULAR
Status: DISCONTINUED | OUTPATIENT
Start: 2019-09-06 | End: 2019-09-06

## 2019-09-06 RX ORDER — CARBOPROST TROMETHAMINE 250 UG/ML
250 INJECTION, SOLUTION INTRAMUSCULAR ONCE
Status: COMPLETED | OUTPATIENT
Start: 2019-09-06 | End: 2019-09-06

## 2019-09-06 RX ORDER — OXYTOCIN 10 [USP'U]/ML
10 INJECTION, SOLUTION INTRAMUSCULAR; INTRAVENOUS
Status: DISCONTINUED | OUTPATIENT
Start: 2019-09-06 | End: 2019-09-09 | Stop reason: HOSPADM

## 2019-09-06 RX ADMIN — CARBOPROST TROMETHAMINE 250 MCG: 250 INJECTION, SOLUTION INTRAMUSCULAR at 21:04

## 2019-09-06 RX ADMIN — ROPIVACAINE HYDROCHLORIDE 6 ML: 2 INJECTION, SOLUTION EPIDURAL; INFILTRATION; PERINEURAL at 16:53

## 2019-09-06 RX ADMIN — SODIUM CHLORIDE, POTASSIUM CHLORIDE, SODIUM LACTATE AND CALCIUM CHLORIDE 1000 ML: 600; 310; 30; 20 INJECTION, SOLUTION INTRAVENOUS at 21:27

## 2019-09-06 RX ADMIN — HYDROCODONE BITARTRATE AND ACETAMINOPHEN 1 TABLET: 5; 325 TABLET ORAL at 23:04

## 2019-09-06 RX ADMIN — Medication 100 ML/HR: at 22:15

## 2019-09-06 RX ADMIN — CEFAZOLIN SODIUM 1 G: 1 INJECTION, SOLUTION INTRAVENOUS at 23:56

## 2019-09-06 RX ADMIN — SODIUM CHLORIDE, POTASSIUM CHLORIDE, SODIUM LACTATE AND CALCIUM CHLORIDE: 600; 310; 30; 20 INJECTION, SOLUTION INTRAVENOUS at 12:26

## 2019-09-06 RX ADMIN — FENTANYL CITRATE 100 MCG: 50 INJECTION, SOLUTION INTRAMUSCULAR; INTRAVENOUS at 21:12

## 2019-09-06 RX ADMIN — MISOPROSTOL 800 MCG: 200 TABLET ORAL at 21:30

## 2019-09-06 RX ADMIN — LIDOCAINE HYDROCHLORIDE AND EPINEPHRINE 5 ML: 15; 5 INJECTION, SOLUTION EPIDURAL at 16:44

## 2019-09-06 RX ADMIN — ACETAMINOPHEN 650 MG: 325 TABLET, FILM COATED ORAL at 17:27

## 2019-09-06 RX ADMIN — Medication 10 ML/HR: at 17:07

## 2019-09-06 RX ADMIN — LIDOCAINE HYDROCHLORIDE 20 ML: 10 INJECTION, SOLUTION EPIDURAL; INFILTRATION; INTRACAUDAL; PERINEURAL at 21:00

## 2019-09-06 RX ADMIN — HYDROMORPHONE HYDROCHLORIDE 0.3 MG: 1 INJECTION, SOLUTION INTRAMUSCULAR; INTRAVENOUS; SUBCUTANEOUS at 23:41

## 2019-09-06 RX ADMIN — LIDOCAINE HYDROCHLORIDE 40 MG: 10 INJECTION, SOLUTION INFILTRATION; PERINEURAL at 16:40

## 2019-09-06 RX ADMIN — Medication 2 MILLI-UNITS/MIN: at 12:25

## 2019-09-06 RX ADMIN — ONDANSETRON HYDROCHLORIDE 4 MG: 2 INJECTION, SOLUTION INTRAMUSCULAR; INTRAVENOUS at 21:15

## 2019-09-06 RX ADMIN — ROPIVACAINE HYDROCHLORIDE 4 ML: 2 INJECTION, SOLUTION EPIDURAL; INFILTRATION; PERINEURAL at 17:06

## 2019-09-06 ASSESSMENT — ACTIVITIES OF DAILY LIVING (ADL)
DRESS: 0-->INDEPENDENT
TRANSFERRING: 0-->INDEPENDENT
BATHING: 0-->INDEPENDENT
FALL_HISTORY_WITHIN_LAST_SIX_MONTHS: NO
TOILETING: 0-->INDEPENDENT
SWALLOWING: 0-->SWALLOWS FOODS/LIQUIDS WITHOUT DIFFICULTY
AMBULATION: 0-->INDEPENDENT
COGNITION: 0 - NO COGNITION ISSUES REPORTED
RETIRED_COMMUNICATION: 0-->UNDERSTANDS/COMMUNICATES WITHOUT DIFFICULTY
RETIRED_EATING: 0-->INDEPENDENT

## 2019-09-06 NOTE — PROGRESS NOTES
Patient here from home with complaints of decreased fetal movement and vaginal bleeding. EFM applied, category 1 tracing noted. No contractions. Patient is 1.5cm dilated. Dark maroon blood was present. Patient stated that MD did an aggressive cervical check on Wednesday. Blood pressures noted to be slightly elevated at this time, will continue to monitor them and notify MD. Vikki Sandoval RN on 9/6/2019 at 0730 AM

## 2019-09-06 NOTE — ANESTHESIA PREPROCEDURE EVALUATION
Anesthesia Pre-Procedure Evaluation    Patient: Nicky Hart   MRN: [id number] : 1995          Preoperative Diagnosis: * No pre-op diagnosis entered *    * No procedures listed *    Past Medical History:   Diagnosis Date     Chronic cough 2002    resolved     Closed fracture of lower end of radius 2006    L arm     History of early menarche 2007     Pregnancy          Past Surgical History:   Procedure Laterality Date     DILATION AND CURETTAGE N/A 2017    Dr. Hazel; [facility] - retained placenta/endometritis       Anesthesia Evaluation       history and physical reviewed . Pt has had prior anesthetic.     No history of anesthetic complications          ROS/MED HX    ENT/Pulmonary:     (+)allergic rhinitis, , . .    Neurologic:  - neg neurologic ROS     Cardiovascular:     (+) hypertension (with pregnancy)----. : . . . :. .       METS/Exercise Tolerance:  >4 METS   Hematologic:  - neg hematologic  ROS       Musculoskeletal: Comment: Idiopathic scoliosis        GI/Hepatic:     (+) GERD Asymptomatic on medication,       Renal/Genitourinary:  - ROS Renal section negative       Endo:  - neg endo ROS       Psychiatric:     (+) psychiatric history anxiety and depression      Infectious Disease:  - neg infectious disease ROS       Malignancy:      - no malignancy   Other:    (+) Possibly pregnant                         Physical Exam  Normal systems: pulmonary and dental    Airway   Mallampati: II  TM distance: > 3 FB  Neck ROM: full  Mouth opening: > 3 cm    Dental     Cardiovascular       Pulmonary             Lab Results   Component Value Date    WBC 12.4 (H) 2019    HGB 11.9 2019    HCT 36.1 2019     2019    CR 0.70 2019    ALBUMIN 3.5 2016    PROTTOTAL 6.3 (L) 2016    ALT 22 2019    AST 19 2019    ALKPHOS 67 2016    BILITOTAL 0.3 2016    BILIDIRECT 0.05 2016    HCG Negative 2018       Preop Vitals  BP  "Readings from Last 3 Encounters:   09/06/19 (!) 143/97   09/04/19 122/80   08/26/19 122/80    Pulse Readings from Last 3 Encounters:   09/04/19 84   08/26/19 78   08/24/19 77      Resp Readings from Last 3 Encounters:   09/06/19 18   08/24/19 20   04/22/19 20    SpO2 Readings from Last 3 Encounters:   08/24/19 98%   03/19/19 100%   02/23/19 100%      Temp Readings from Last 1 Encounters:   09/06/19 97.5  F (36.4  C) (Temporal)    Ht Readings from Last 1 Encounters:   08/24/19 1.651 m (5' 5\")      Wt Readings from Last 1 Encounters:   09/04/19 87.1 kg (192 lb)    Estimated body mass index is 31.95 kg/m  as calculated from the following:    Height as of 8/24/19: 1.651 m (5' 5\").    Weight as of 9/4/19: 87.1 kg (192 lb).       Anesthesia Plan      History & Physical Review      ASA Status:  2 .  OB Epidural Asa: 2       Plan for Epidural          Postoperative Care      Consents  Anesthetic plan, risks, benefits and alternatives discussed with:  Patient, Patient and Spouse.  Use of blood products discussed: No .   .                 MIGDALIA Edwards CRNA  "

## 2019-09-06 NOTE — PROGRESS NOTES
Intrapartum Progress Note    S: Patient is getting comfortable with epidural.     O: BP (!) 143/97   Temp 97.5  F (36.4  C) (Temporal)   Resp 18   LMP 2018    Gen: resting in bed, NAD  Cvx: 3-4/80/-2    FHT: 150, mod variability, + accels, no decels  Lorimor: 3 contractions in 10 min    Membranes: s/p AROM    A/P: 24 year old  at 37w1d admitted for IOL for PreEclampsia without severe features  PreE w/o SF: mild range BPs. Normal HELLP labs on admission  FWB: Cat I, reactive. EFW 6.5 lbs  Labor: on pitocin  Pain: epidural  GBS: negative, PCN not indicated  Rh: negative  Rubella: unknown- was immune during last pregnancy. Will check PP  Continue routine labor management.   Anticipate     Zaira Moffett MD 5:06 PM

## 2019-09-06 NOTE — H&P
[facility] Labor and Delivery History and Physical    Nicky Hart MRN# [id number]   Age: 24 year old YOB: 1995     Date of Admission:  2019    Primary care provider: Arpita Carrington           Chief Complaint:   Nicky Hart is a 24 year old  at 37w1d who presented with complaints of vaginal bleeding and decreased FM. She had her cervix checked two days ago, no bleeding after. However, this morning when she woke up, she noticed dark blood in the toilet. She also reported decreased FM after she woke up but improved after arriving on [facility]. Denies headache, vision changes, chest pain, difficulty breathing, RUQ pain, abdominal pain, trauma, contractions, LOF, worsening LE edema.           Pregnancy history:     OBSTETRIC HISTORY:    OB History    Para Term  AB Living   2 1 1 0 0 1   SAB TAB Ectopic Multiple Live Births   0 0 0 0 1      # Outcome Date GA Lbr Surinder/2nd Weight Sex Delivery Anes PTL Lv   2 Current            1 Term 04/10/17 40w0d  3.289 kg (7 lb 4 oz) M IVD EPI N NINA       EDC: Estimated Date of Delivery: Sep 26, 2019    Prenatal Labs:   Lab Results   Component Value Date    ABO A 2019    RH Neg 2019    AS Pos (A) 03/15/2019    HEPBANG Nonreactive 2019    HGB 11.9 2019       GBS Status: negative    Active Problem List  Patient Active Problem List   Diagnosis     Allergic state     Allergic rhinitis due to pollen     Depression with anxiety     Dysmenorrhea     Idiopathic scoliosis     Subacute endomyometritis     Vaginal bleeding     Preeclampsia       Medication Prior to Admission  Medications Prior to Admission   Medication Sig Dispense Refill Last Dose     EPINEPHrine (EPIPEN/ADRENACLICK/OR ANY BX GENERIC EQUIV) 0.3 MG/0.3ML injection 2-pack Inject 0.3 mg into the muscle once as needed for allergic reaction for up to 1 dose.   Taking     famotidine (PEPCID) 20 MG tablet TAKE 1 TABLET (20 MG) BY MOUTH 2 TIMES  DAILY 60 tablet 1 Taking     Prenatal Vit-Fe Fumarate-FA (PRENATAL VITAMIN) 27-0.8 MG TABS Take 1 tablet by mouth daily 100 tablet 3 Taking     triamcinolone (KENALOG) 0.1 % external ointment Apply topically 2 times daily For up to 10 days 60 g 0 Taking   .        Maternal Past Medical History:     Past Medical History:   Diagnosis Date     Chronic cough 2002    resolved     Closed fracture of lower end of radius 2006    L arm     History of early menarche 2007     Pregnancy          Past Surgical History:   Procedure Laterality Date     DILATION AND CURETTAGE N/A 2017    Dr. Hazel; [facility] - retained placenta/endometritis                       Family History:     Family History   Problem Relation Age of Onset     GERD Mother      GERD Maternal Grandmother      Ulcerative Colitis Maternal Grandfather                Social History:     Social History     Tobacco Use     Smoking status: Never Smoker     Smokeless tobacco: Never Used   Substance Use Topics     Alcohol use: No     Alcohol/week: 0.0 oz     Frequency: Never     Comment: Alcoholic Drinks/day: rare            Review of Systems:   The Review of Systems is negative other than noted in the HPI          Physical Exam:     Patient Vitals for the past 8 hrs:   BP Temp Temp src Resp   19 0841 (!) 143/97 -- -- --   19 0830 (!) 147/94 -- -- --   19 0820 (!) 148/94 -- -- --   19 0811 (!) 150/93 -- -- --   19 0801 (!) 147/93 -- -- --   19 0750 (!) 140/94 -- -- --   19 0723 (!) 145/99 97.5  F (36.4  C) Temporal 18     Gen: resting in bed, NAD  CV: RRR  Resp: CTAB  Abd: gravid, soft, nontender  Ext: nontender    Cervix: 2-3/80/-2  Membranes: AROM- clear  EFW: 6.5 lbs  Presentation:Cephalic    Fetal Heart Rate Tracin, mod variability, + accels, no decels  Tocometer: irregular, infrequent ctx        Assessment:   Nicky Hart is a 24 year old  at 37w1d admitted vaginal bleeding, decreased FM but  persistent mild range BPs and proteinuria, meeting criteria for preeclampsia without severe features. Due to term gestational age, recommend proceeding with delivery, to which patient agrees          Plan:   PreEclampsia without severe features: normal HELLP labs. Continue to monitor.  FWB: Cat I, reactive. EFW 6.5 lbs  Labor: now s/p AROM, pitocin per protocol  Pain: per patient request  Rh negative s/p Rhogam   RI, GBS negative  Anticipate     Zaira Moffett MD

## 2019-09-06 NOTE — ANESTHESIA PROCEDURE NOTES
Peripheral nerve/Neuraxial procedure note : epidural catheter  Pre-Procedure  Performed by  Nitin Duong APRN CRNA   Location: OB    Procedure Times:9/6/2019 4:30 PM and 9/6/2019 4:57 PM  Pre-Anesthestic Checklist: patient identified, IV checked, risks and benefits discussed, informed consent, monitors and equipment checked, pre-op evaluation, at physician/surgeon's request and post-op pain management    Timeout  Correct Patient: Yes   Correct Procedure: Yes   Correct Site: Yes   Correct Laterality: Yes   Correct Position: Yes     .   Procedure Documentation    Diagnosis:Labor Pain.    Procedure:    Epidural catheter.  Insertion Site:L3-4  (midline approach) Injection technique: LORT air   Local skin infiltrated with 4 mL of 1% lidocaine.  MAYITO at 8 cm     Patient Prep;mask, sterile gloves, chlorhexidine gluconate and isopropyl alcohol, patient draped.  .  Needle: Touhy needle Needle Gauge: 18.    Needle Length (Inches) 3.5  # of attempts: 1 and # of redirects:  .   Catheter: 20 G . .  Catheter threaded easily  .  15 cm at skin.   .    Assessment/Narrative  Paresthesias: Resolved (Quick shot down left leg, resolved once cathter pulled to current spot).  .  .  Aspiration negative for heme or CSF  . Test dose of 5 mL lidocaine 1.5% w/ 1:200,000 epinephrine at 16:44.  Test dose negative for signs of intravascular, subdural or intrathecal injection.

## 2019-09-07 LAB
ABO + RH BLD: NORMAL
ABO + RH BLD: NORMAL
BLOOD BANK CMNT PATIENT-IMP: NORMAL
BLOOD BANK CMNT PATIENT-IMP: NORMAL
DATE RH IMM GL GVN: NORMAL
ERYTHROCYTE [DISTWIDTH] IN BLOOD BY AUTOMATED COUNT: 12.7 % (ref 10–15)
FETAL CELL SCN BLD QL ROSETTE: NORMAL
HCT VFR BLD AUTO: 31.1 % (ref 35–47)
HGB BLD-MCNC: 10.4 G/DL (ref 11.7–15.7)
MCH RBC QN AUTO: 29.1 PG (ref 26.5–33)
MCHC RBC AUTO-ENTMCNC: 33.4 G/DL (ref 31.5–36.5)
MCV RBC AUTO: 87 FL (ref 78–100)
PLATELET # BLD AUTO: 257 10E9/L (ref 150–450)
RBC # BLD AUTO: 3.57 10E12/L (ref 3.8–5.2)
RH IG VIALS RECOM PATIENT: NORMAL
T PALLIDUM AB SER QL: NONREACTIVE
WBC # BLD AUTO: 24.7 10E9/L (ref 4–11)

## 2019-09-07 PROCEDURE — 86900 BLOOD TYPING SEROLOGIC ABO: CPT | Performed by: OBSTETRICS & GYNECOLOGY

## 2019-09-07 PROCEDURE — 86901 BLOOD TYPING SEROLOGIC RH(D): CPT | Performed by: OBSTETRICS & GYNECOLOGY

## 2019-09-07 PROCEDURE — 86762 RUBELLA ANTIBODY: CPT | Performed by: OBSTETRICS & GYNECOLOGY

## 2019-09-07 PROCEDURE — 30233N1 TRANSFUSION OF NONAUTOLOGOUS RED BLOOD CELLS INTO PERIPHERAL VEIN, PERCUTANEOUS APPROACH: ICD-10-PCS | Performed by: OBSTETRICS & GYNECOLOGY

## 2019-09-07 PROCEDURE — 25000132 ZZH RX MED GY IP 250 OP 250 PS 637: Performed by: OBSTETRICS & GYNECOLOGY

## 2019-09-07 PROCEDURE — 25000128 H RX IP 250 OP 636: Performed by: OBSTETRICS & GYNECOLOGY

## 2019-09-07 PROCEDURE — 25000125 ZZHC RX 250: Performed by: OBSTETRICS & GYNECOLOGY

## 2019-09-07 PROCEDURE — 85027 COMPLETE CBC AUTOMATED: CPT | Performed by: OBSTETRICS & GYNECOLOGY

## 2019-09-07 PROCEDURE — 25800030 ZZH RX IP 258 OP 636: Performed by: OBSTETRICS & GYNECOLOGY

## 2019-09-07 PROCEDURE — 85461 HEMOGLOBIN FETAL: CPT | Performed by: OBSTETRICS & GYNECOLOGY

## 2019-09-07 PROCEDURE — 72200001 ZZH LABOR CARE VAGINAL DELIVERY SINGLE

## 2019-09-07 PROCEDURE — 12000000 ZZH R&B MED SURG/OB

## 2019-09-07 PROCEDURE — 36415 COLL VENOUS BLD VENIPUNCTURE: CPT | Performed by: OBSTETRICS & GYNECOLOGY

## 2019-09-07 PROCEDURE — 99207 ZZC NO CHARGE LOS: CPT | Performed by: OBSTETRICS & GYNECOLOGY

## 2019-09-07 RX ORDER — METOCLOPRAMIDE HYDROCHLORIDE 5 MG/ML
10 INJECTION INTRAMUSCULAR; INTRAVENOUS EVERY 6 HOURS
Status: DISCONTINUED | OUTPATIENT
Start: 2019-09-07 | End: 2019-09-07

## 2019-09-07 RX ORDER — SODIUM CHLORIDE, SODIUM LACTATE, POTASSIUM CHLORIDE, CALCIUM CHLORIDE 600; 310; 30; 20 MG/100ML; MG/100ML; MG/100ML; MG/100ML
INJECTION, SOLUTION INTRAVENOUS CONTINUOUS
Status: DISCONTINUED | OUTPATIENT
Start: 2019-09-07 | End: 2019-09-07 | Stop reason: CLARIF

## 2019-09-07 RX ORDER — HYDROCODONE BITARTRATE AND ACETAMINOPHEN 5; 325 MG/1; MG/1
1-2 TABLET ORAL EVERY 4 HOURS PRN
Status: DISCONTINUED | OUTPATIENT
Start: 2019-09-07 | End: 2019-09-09 | Stop reason: HOSPADM

## 2019-09-07 RX ORDER — METOCLOPRAMIDE HYDROCHLORIDE 5 MG/ML
10 INJECTION INTRAMUSCULAR; INTRAVENOUS EVERY 6 HOURS PRN
Status: DISCONTINUED | OUTPATIENT
Start: 2019-09-07 | End: 2019-09-09 | Stop reason: HOSPADM

## 2019-09-07 RX ADMIN — IBUPROFEN 800 MG: 400 TABLET ORAL at 17:05

## 2019-09-07 RX ADMIN — HYDROMORPHONE HYDROCHLORIDE 0.5 MG: 1 INJECTION, SOLUTION INTRAMUSCULAR; INTRAVENOUS; SUBCUTANEOUS at 00:46

## 2019-09-07 RX ADMIN — HYDROMORPHONE HYDROCHLORIDE 0.3 MG: 1 INJECTION, SOLUTION INTRAMUSCULAR; INTRAVENOUS; SUBCUTANEOUS at 01:51

## 2019-09-07 RX ADMIN — IBUPROFEN 800 MG: 400 TABLET ORAL at 04:36

## 2019-09-07 RX ADMIN — SODIUM CHLORIDE, POTASSIUM CHLORIDE, SODIUM LACTATE AND CALCIUM CHLORIDE: 600; 310; 30; 20 INJECTION, SOLUTION INTRAVENOUS at 01:00

## 2019-09-07 RX ADMIN — HYDROCODONE BITARTRATE AND ACETAMINOPHEN 1 TABLET: 5; 325 TABLET ORAL at 20:50

## 2019-09-07 RX ADMIN — METOCLOPRAMIDE HYDROCHLORIDE 10 MG: 5 INJECTION INTRAMUSCULAR; INTRAVENOUS at 00:44

## 2019-09-07 RX ADMIN — SENNOSIDES AND DOCUSATE SODIUM 1 TABLET: 8.6; 5 TABLET ORAL at 20:50

## 2019-09-07 RX ADMIN — SENNOSIDES AND DOCUSATE SODIUM 1 TABLET: 8.6; 5 TABLET ORAL at 11:11

## 2019-09-07 RX ADMIN — HYDROMORPHONE HYDROCHLORIDE 0.3 MG: 1 INJECTION, SOLUTION INTRAMUSCULAR; INTRAVENOUS; SUBCUTANEOUS at 03:18

## 2019-09-07 RX ADMIN — CEFAZOLIN SODIUM 1 G: 1 INJECTION, SOLUTION INTRAVENOUS at 07:35

## 2019-09-07 RX ADMIN — SODIUM CHLORIDE, POTASSIUM CHLORIDE, SODIUM LACTATE AND CALCIUM CHLORIDE: 600; 310; 30; 20 INJECTION, SOLUTION INTRAVENOUS at 06:58

## 2019-09-07 RX ADMIN — HYDROCODONE BITARTRATE AND ACETAMINOPHEN 1 TABLET: 5; 325 TABLET ORAL at 11:48

## 2019-09-07 RX ADMIN — Medication 100 ML/HR: at 08:28

## 2019-09-07 RX ADMIN — RHO(D) IMMUNE GLOBULIN (HUMAN) 300 MCG: 1500 SOLUTION INTRAMUSCULAR at 11:09

## 2019-09-07 NOTE — PROGRESS NOTES
OB/GYN Postpartum Note      S:  Patient is feeling better this morning. Still having some cramping but improving. bakri balloon and gabriel still in place.     O:   Vitals:    19 0457 19 0657 19 0727 19 0737   BP: 133/72 121/74 123/76    Pulse:       Resp: 16      Temp:    97.3  F (36.3  C)   TempSrc:    Temporal   SpO2: 98% 94%       General: resting in bed, in NAD  Resp: nonlabored  Abdomen: soft, nontender, nondistended  Fundus firm at umbilicus  Extremities: nontender    Hemoglobin   Date Value Ref Range Status   2019 10.4 (L) 11.7 - 15.7 g/dL Final   ]    A: Ms. Nicky Hart is a 24 year old  PPD #1 s/p  c/b postpartum hemorrhage    P:  PreEclampsia without severe features: normotensive to mild range since delivery. Normal HELLP labs this AM    Postpartum hemorrhage: EBL 1800, s/p 1U pRBCs. Vitals stable, bleeding stabilized. 180cc removed from Bakri balloon, 180cc remaining. Will remove 60cc per hour over the next 3 hours until removed. Can remove gabriel catheter upon removal of bakri. Will recheck Hgb in AM. Can stop ancef with bakri removal.    GI: tolerating regular diet  Feeding: breast  Contraception: vasectomy  Rubella unknown, was previously immune in last pregnancy. Repeat titer pending.  Rh negative, infant Rh positive. Will need Rhogam before discharge  Disposition: routine PP cares, anticipate discharge PPD#3-4    Zaira Moffett MD  OB/GYN  2019 10:26 AM

## 2019-09-07 NOTE — PROGRESS NOTES
Patient delivered baby girl 9/6/19 at 2054. Excessive vaginal bleeding after placenta delivered at 2058, administered Hemabate, pitocin 20 units in LR IV wide open and Cytotec. Randall balloon placed by MD at 2120 filled with 360 ml sterile water, draining to collection bag. Labs drawn  Postpartum, HGB 11.0 at 2120. Huynh catheter placed at 2130 for accurate I&O. 1 unit PRBC's administered at 2236, finished at 2350, post HGB to be drawn at 0500. See flowsheet for VS and frequent assessments. RN at bedside 1 to 1 continuous observation since delivery.

## 2019-09-07 NOTE — L&D DELIVERY NOTE
OB Vaginal Delivery Note    Nicky Hart MRN# [id number]   Age: 24 year old YOB: 1995       GA: 37w1d  GP:   Labor Complications: Preeclampsia/Hypertension   EBL: 1800  mL  Delivery QBL:    Delivery Type: Vaginal, Spontaneous   ROM to Delivery Time: (Delivered) Hours: 8 Minutes: 4   Weight: 2.773 kg (6 lb 1.8 oz)    1 Minute 5 Minute 10 Minute   Apgar Totals: 7    8                Delivery Details:  Nicky Hart, a 24 year old  who presented at 37w1d for vaginal bleeding and decreased FM. Upon arrival, she was noted to have persistent mild range hypertension and a urine protein to creatinine ratio of 2.95. She was admitted for induction of labor due to PreEclampsia without severe features. She received pitocin and AROM for labor induction. She slowly progressed to 4cm, then quickly progressed from 4cm to complete in 45 minutes. She pushed effectively and delivered a vigorous female infant without complication. Apgars 7/8. Weight 2773g. Placenta delivered spontaneously and appeared intact.     After delivery of the placenta, uterine atony with excessive vaginal bleeding was noted. A manual sweep was performed with removal of clot but no evidence of retained placental fragments. Bimanual massage was performed while administering pitocin, IM hemabate and rectal misoprostol. She continued to have heavy uterine bleeding. The entirety of the cervix was inspected and noted to be intact. A Bakri balloon was placed with 360 ml of sterile water, which ultimately improved her uterine tone and bleeding. A first degree perineal laceration was repaired with 3-0 vicryl. The blood that was collected in the drape was measured to be 1100cc, with an additional 700cc estimated in lap sponges and on the floor.     Zaira Moffett MD  OB/GYN  2019 10:27 PM         Labor Event Times    Dilation complete date:  19 Complete time:   8:45 PM   Start pushing date/time:  2019      Labor  "Length    2nd Stage (hrs):  0 (min):  9   3rd Stage (hrs):  0 (min):  4      Rupture date/time: 19 1250   Rupture type:  Artificial Rupture of Membranes  Fluid color:  Clear     Delivery/Placenta Date and Time    Delivery Date:  19 Delivery Time:   8:54 PM   Placenta Date/Time:  2019  8:58 PM     Vaginal Counts     Initial count performed by 2 team members:   Two Team Members   Vikki [place] RN       [place] [employer] [place] [employer]   Initial counts 1 0 6 12   Added to count 0 1 10    Final counts 1 1 16 12   Placed during labor Accounted for at the end of labor    Final count performed by 2 team members:   Two Team Members   Sondra Houston RN         Apgars    Living status:  Living   1 Minute 5 Minute 10 Minute 15 Minute 20 Minute   Skin color: 0  1       Heart rate: 2  2       Reflex irritability: 2  2       Muscle tone: 2  2       Respiratory effort: 1  1       Total: 7  8       Apgars assigned by:  OZ HATHAWAY     Cord    Vessels:  3 Vessels Complications:  None   Cord Blood Disposition:  Lab Gases Sent?:  No      [place] Resuscitation    Methods:  Suctioning, Oxygen, NCPAP  Tracheal Suction Passes:  3 Tracheal Returns:  Blood     Care at Delivery:  Cord clamped at 1 minute with HR in the 120s. Placed on mother's chest for drying and stimulation.  Apgars 7 and 8.  At 5 minutes while babe skin to skin with mom she spit up small amount of bloody mucous.  Brought to warmer for evaluation and bulb suction.  Deelee for 4 mL of blood mucous.  Bilateral retractions noted with poor air movement in the lungs. RR 40s.  PPV. Given then weaned to CPAP at 15 minutes.   O2 sat. %.  Weaned to room air by 33 minutes.  Baby stable and remained with mother in the room.   Output in Delivery Room:  Voided      Measurements    Weight:  6 lb 1.8 oz Length:  1' 7\"   Head circumference:  33 cm Chest circumference:  32.4 cm      Skin to Skin and Feeding Plan    Skin to skin initiation " date/time: 1/9/1841    Skin to skin with:  Mother  Skin to skin end date/time: 1/9/1841    How do you plan to feed your baby:  Breastfeeding     Labor Events and Shoulder Dystocia    Fetal Tracing Prior to Delivery:  Category 1  Shoulder dystocia present?:  Neg     Delivery (Maternal) (Provider to Complete) (005453)    Episiotomy:  None  Perineal lacerations:  1st Repaired?:  Yes   Vaginal laceration?:  No    Cervical laceration?:  No    Est. blood loss (mL):  1800     Blood Loss  Mother: Nicky Hart #[id number]   Start of Mother's Information    IO Blood Loss  09/06/19 0854 - 09/07/19 0931    EBL (mL) Hospital Encounter 1800 mL    Total  1800 mL         End of Mother's Information  Mother: Nicky Hart #[id number]         Delivery - Provider to Complete ([id number])    Delivering clinician:  Zaira Moffett MD  Delivery Type (Choose the 1 that will go to the Birth History):  Vaginal, Spontaneous          Placenta    Delayed Cord Clamping:  Done  Date/Time:  9/6/2019  8:58 PM  Removal:  Expressed  Disposition:  Hospital disposal     Anesthesia    Method:  Epidural  Cervical dilation at placement:  0-3          Presentation and Position    Presentation:  Vertex  Position:  Left Occiput Anterior           Zaira Moffett MD

## 2019-09-07 NOTE — PROGRESS NOTES
Patient sitting up and holding baby, states that she is stating to feel better, pain rated 4/10 administered Ibuprofen at 0436, Dilaudid 0.3mg at 0318. Repositioned in bed, call light in reach, no other requests.

## 2019-09-07 NOTE — PROGRESS NOTES
180mL was removed at 0945 by MD, 60mL was removed at 1055, 1150, and 1330. Patient tolerated removal of the bakri balloon and gabriel catheter at 1330. Fundus remained firm after each removal of fluid. Will continue to do fundal checks and monitor patient. Vikki Sandoval RN on 9/7/2019 at 1345 PM

## 2019-09-08 LAB — HGB BLD-MCNC: 9 G/DL (ref 11.7–15.7)

## 2019-09-08 PROCEDURE — 12000000 ZZH R&B MED SURG/OB

## 2019-09-08 PROCEDURE — 36415 COLL VENOUS BLD VENIPUNCTURE: CPT | Performed by: OBSTETRICS & GYNECOLOGY

## 2019-09-08 PROCEDURE — 25000132 ZZH RX MED GY IP 250 OP 250 PS 637: Performed by: OBSTETRICS & GYNECOLOGY

## 2019-09-08 PROCEDURE — 85018 HEMOGLOBIN: CPT | Performed by: OBSTETRICS & GYNECOLOGY

## 2019-09-08 PROCEDURE — 99207 ZZC NO CHARGE LOS: CPT | Performed by: OBSTETRICS & GYNECOLOGY

## 2019-09-08 RX ADMIN — ACETAMINOPHEN 650 MG: 325 TABLET, FILM COATED ORAL at 14:26

## 2019-09-08 RX ADMIN — IBUPROFEN 800 MG: 400 TABLET ORAL at 01:11

## 2019-09-08 RX ADMIN — SENNOSIDES AND DOCUSATE SODIUM 2 TABLET: 8.6; 5 TABLET ORAL at 10:34

## 2019-09-08 RX ADMIN — IBUPROFEN 800 MG: 400 TABLET ORAL at 20:50

## 2019-09-08 RX ADMIN — IBUPROFEN 800 MG: 400 TABLET ORAL at 10:36

## 2019-09-08 NOTE — PROGRESS NOTES
OB/GYN Postpartum Note      S:  Patient is feeling better this morning.  Lochia light.  Eating and drinking without nausea.  Ambulating without difficulty.  +Flatus.  Pain is well controlled.  Breastfeeding without questions or concerns.      O:   Vitals:    19 1156 19 1703 19 1706 19 0101   BP: 119/73 128/83  135/82   Pulse:       Resp:    16   Temp:   98.6  F (37  C) 97.9  F (36.6  C)   TempSrc:   Temporal Temporal   SpO2:    99%     General: resting in chair, in NAD  Resp: nonlabored  Abdomen: soft, nontender, nondistended  Fundus firm at umbilicus  Extremities: nontender    Hemoglobin   Date Value Ref Range Status   2019 9.0 (L) 11.7 - 15.7 g/dL Final   ]    A: Ms. Nicky Hart is a 24 year old  PPD #2 s/p  c/b postpartum hemorrhage    P:  PreEclampsia without severe features: normotensive to mild range since delivery. Normal HELLP labs     Postpartum hemorrhage: EBL 1800, s/p 1U pRBCs. S/p Bakri. Asymptomatic anemia     GI: tolerating regular diet  Feeding: breast  Contraception: vasectomy  Rubella unknown, was previously immune in last pregnancy. Repeat titer pending.  Rh negative, infant Rh positive. S/p Rhogam  Disposition: routine PP cares, anticipate discharge PPD#3    Zaira Moffett MD  OB/GYN  2019 9:57 AM

## 2019-09-08 NOTE — ANESTHESIA POSTPROCEDURE EVALUATION
Patient: Nicky Hart    * No procedures listed *    Diagnosis:* No pre-op diagnosis entered *  Diagnosis Additional Information: No value filed.    Anesthesia Type:  Epidural    Note:  Anesthesia Post Evaluation    Patient location during evaluation: Bedside  Patient participation: Able to fully participate in evaluation  Level of consciousness: awake and alert  Pain management: adequate  Airway patency: patent  Cardiovascular status: acceptable  Respiratory status: acceptable  Hydration status: acceptable  PONV: none     Anesthetic complications: None    Comments: Patient happy with epidural. Up walking, no residual numbness or tingling, voiding without difficulty, denies fevers or chills.          Last vitals:  Vitals:    09/08/19 0101 09/08/19 1037 09/08/19 1038   BP: 135/82 (!) 137/93    Pulse:      Resp: 16 16    Temp: 97.9  F (36.6  C)  98.2  F (36.8  C)   SpO2: 99%           Electronically Signed By: MIGDALIA Edwards CRNA  September 8, 2019  11:35 AM

## 2019-09-09 VITALS
RESPIRATION RATE: 16 BRPM | TEMPERATURE: 98.1 F | HEART RATE: 85 BPM | DIASTOLIC BLOOD PRESSURE: 92 MMHG | SYSTOLIC BLOOD PRESSURE: 135 MMHG | OXYGEN SATURATION: 98 %

## 2019-09-09 PROCEDURE — 25000132 ZZH RX MED GY IP 250 OP 250 PS 637: Performed by: OBSTETRICS & GYNECOLOGY

## 2019-09-09 PROCEDURE — 99207 ZZC NO CHARGE LOS: CPT | Performed by: OBSTETRICS & GYNECOLOGY

## 2019-09-09 RX ORDER — IBUPROFEN 600 MG/1
600 TABLET, FILM COATED ORAL EVERY 6 HOURS PRN
Qty: 30 TABLET | Refills: 0 | Status: SHIPPED | OUTPATIENT
Start: 2019-09-09 | End: 2019-10-15

## 2019-09-09 RX ORDER — CITALOPRAM HYDROBROMIDE 10 MG/1
10 TABLET ORAL DAILY
Qty: 90 TABLET | Refills: 3 | Status: SHIPPED | OUTPATIENT
Start: 2019-09-09 | End: 2019-10-15

## 2019-09-09 RX ORDER — LOPERAMIDE HCL 2 MG
2 CAPSULE ORAL 4 TIMES DAILY PRN
Status: DISCONTINUED | OUTPATIENT
Start: 2019-09-09 | End: 2019-09-09 | Stop reason: HOSPADM

## 2019-09-09 RX ADMIN — LOPERAMIDE HYDROCHLORIDE 2 MG: 2 CAPSULE ORAL at 08:30

## 2019-09-09 NOTE — DISCHARGE INSTRUCTIONS
Activity:  Do not lift anything heavier than your baby in their car seat.  Abstain from sexual intercourse x 6 weeks.    Call your Doctor if you have any of these symptoms:    * Heavy vaginal bleeding with or without clots.     *Foul smelling vaginal discharge.    *A fever above 100.4 degrees Fahrenheit (38.4 degrees Celsius).    * Severe abdominal pain or cramping.    * Leg pain    * Chest pain or shortness of breath    * Problems coping with sadness, anxiety, or depression.    * Redness or painful breasts    [facility]: [phone]

## 2019-09-09 NOTE — PLAN OF CARE
Problem: Pain (Postpartum Vaginal Delivery)  Goal: Acceptable Pain Control  Outcome: Adequate for Discharge  Intervention: Prevent or Manage Pain  Flowsheets (Taken 9/9/2019 4042)  Pain Management Interventions: medication (see MAR); tub bath; cold applied  Note:   Patient has been taking ibuprofen prn for uterine cramping pain.  Also using warm pack. Suggested tub bath but patient declined. Denies perineal pain. Using ice packs and doing pericares independently.

## 2019-09-09 NOTE — PROGRESS NOTES
[facility]    Post-Partum Progress Note    Assessment & Plan   Assessment:  Post-partum day #3  Normal spontaneous vaginal delivery  S/p PPH    Doing well.  No excessive bleeding    Plan:  Ambulation encouraged  Discharge later today    Jeremías Hazel     Interval History   Doing well.  Pain is well-controlled.  No fevers.  No history of foul-smelling vaginal discharge.  Good appetite.  Denies chest pain, shortness of breath, nausea or vomiting.  Vaginal bleeding is similar to a heavy menstrual flow.  Ambulatory.  Breastfeeding well.    Medications     - MEDICATION INSTRUCTIONS -       - MEDICATION INSTRUCTIONS -       - MEDICATION INSTRUCTIONS -       oxytocin in 0.9% NaCl         senna-docusate  1 tablet Oral BID    Or     senna-docusate  2 tablet Oral BID       Physical Exam   Temp: 98  F (36.7  C) Temp src: Temporal BP: (!) 132/92 Pulse: 85   Resp: 16        There were no vitals filed for this visit.  Vital Signs with Ranges  Temp:  [97.5  F (36.4  C)-98.2  F (36.8  C)] 98  F (36.7  C)  Pulse:  [85] 85  Resp:  [16] 16  BP: (132-137)/(92-98) 132/92  No intake/output data recorded.    Uterine fundus is firm, non-tender and at the level of the umbilicus  Extremities Non-tender    Data   Recent Labs   Lab Test 09/07/19  0515 09/06/19  1216   ABO A A   RH Neg Neg   AS  --  Pos*     Recent Labs   Lab Test 09/08/19  0555 09/07/19  0515   HGB 9.0* 10.4*     No lab results found.     individual instruction

## 2019-09-09 NOTE — PLAN OF CARE
Assessments completed as charted. B/P: 132/92, T: 98, P: 85, R: 16. Rates pain: 4/10 uterine cramping. Voiding without difficulty. Fundus: Midline firm and U/2. Lochia: Light. Activity: unrestricted with out pain  and normal activity. Infant feeding: Breast feeding going well.     LATCH Score:   Latch: 2 - Good Latch  Audible Swallowin - Spontaneous & frequent  Type of Nipple: (Breast/Nipple) 2 - Everted  Comfort: 1 - Filling, small blisters, mild/mod pain  Hold: 2 - No Assist   Total LATCH Score: 10    Postpartum breastfeeding assessment completed and education provided, see Patient Education Activity.  Items included in the education are:   proper positioning and latch  effectiveness of feeding  manual expression  handling and storing breastmilk  maintenance of breastfeeding for the first 6 months  sign/symptoms of infant feeding issues requiring referral to qualified health care provider  Postpartum care education provided, see Patient Education activity. Patient denies needs. Will monitor.  Rosemarie Steele RN BSN PHN

## 2019-09-09 NOTE — LACTATION NOTE
INPATIENT LACTATION CONSULT      Consult with Nicky and ilya regarding breastfeeding. Nicky states she notices obvious rooting with a strong latch during feedings.  Rhythmic and aggressive suckling also noted.  Instructed Nicky on correct positioning and technique when latching babe on.  Nicky is independent with latching babe onto breast.  Minimal assistance required.  Encouraged Nicky on the importance of frequent feedings throughout the day (at least 8-12 feedings in a 24 hour period) and skin to skin contact.  Nicky demonstrated and states she understands all information given.    Zoie Cho RN, IBCLC  Lactation Consultant  [facility]

## 2019-09-09 NOTE — PROGRESS NOTES
Patient reported having multiple loose stools in the last day, requesting medication to help slow down her BMs, notified MD of patient's request.

## 2019-09-09 NOTE — DISCHARGE SUMMARY
[facility]    Discharge Summary  Obstetrics    Date of Admission:  2019  Date of Discharge:  2019  Discharging Provider: Jeremías Hazel    Discharge Diagnoses   Vaginal bleeding   (Normal vaginal delivery)    History of Present Illness   Nicky Hart is a 24 year old female who presented with hypertension, labor induction, with  and postpartum bleeding requiring transfusion and Bakkri balloon. She subsequently did well with stable lochia and bleeding.    Hospital Course   On discharge, her pain was well controlled. Vaginal bleeding is similar to peak menstrual flow.  Voiding without difficulty.  Ambulating well and tolerating a normal diet.  No fevers.  Breastfeeding well.  Infant is stable.  She was discharged on post-partum day #2.    Post-partum hemoglobin:   Hemoglobin   Date Value Ref Range Status   2019 9.0 (L) 11.7 - 15.7 g/dL Final       Jeremías Hazel MD    Discharge Disposition   Discharged to home   Condition at discharge: Stable    Primary Care Physician   Arpita Carrington    Consultations This Hospital Stay   ANESTHESIOLOGY IP CONSULT  HOME CARE POST PARTUM/ IP CONSULT  LACTATION IP CONSULT    Discharge Orders      Activity    Review discharge instructions     Reason for your hospital stay    Maternity care     Follow Up and recommended labs and tests    Follow up with me,  Jeremías Hazel MD, within 6 weeks. for hospital follow- up.  No follow up labs or test are needed.     Discharge Instructions - Postpartum visit    Schedule postpartum visit with your provider and return to clinic in 6 weeks. Also 2 weeks if a  section was done.     Diet    Resume previous diet     Discharge Medications   Current Discharge Medication List      START taking these medications    Details   citalopram (CELEXA) 10 MG tablet Take 1 tablet (10 mg) by mouth daily  Qty: 90 tablet, Refills: 3    Associated Diagnoses: Postpartum depression      ibuprofen  (ADVIL/MOTRIN) 600 MG tablet Take 1 tablet (600 mg) by mouth every 6 hours as needed for other (cramping)  Qty: 30 tablet, Refills: 0    Associated Diagnoses:  (spontaneous vaginal delivery)         CONTINUE these medications which have NOT CHANGED    Details   EPINEPHrine (EPIPEN/ADRENACLICK/OR ANY BX GENERIC EQUIV) 0.3 MG/0.3ML injection 2-pack Inject 0.3 mg into the muscle once as needed for allergic reaction for up to 1 dose.      famotidine (PEPCID) 20 MG tablet TAKE 1 TABLET (20 MG) BY MOUTH 2 TIMES DAILY  Qty: 60 tablet, Refills: 1    Associated Diagnoses: Gastroesophageal reflux disease with esophagitis      Prenatal Vit-Fe Fumarate-FA (PRENATAL VITAMIN) 27-0.8 MG TABS Take 1 tablet by mouth daily  Qty: 100 tablet, Refills: 3    Associated Diagnoses: Normal pregnancy in first trimester      triamcinolone (KENALOG) 0.1 % external ointment Apply topically 2 times daily For up to 10 days  Qty: 60 g, Refills: 0    Associated Diagnoses: Irritant contact dermatitis, unspecified trigger           Allergies   Allergies   Allergen Reactions     [place] Grass Itching     Dust Mite Extract Itching     Mold Itching     Pollen Extract Itching     Tree Nuts  [Nuts] Itching

## 2019-09-09 NOTE — PROGRESS NOTES
Patient discharged to home with mother in law and . Baby secured in car seat. Discharge paperwork given, no questions or concerns. Patient belongings returned. Follow-up appointments made.

## 2019-09-10 LAB
ABO + RH BLD: ABNORMAL
ABO + RH BLD: ABNORMAL
BLD GP AB INVEST PLASRBC-IMP: ABNORMAL
BLD GP AB SCN SERPL QL: ABNORMAL
BLD PROD TYP BPU: ABNORMAL
BLD PROD TYP BPU: NORMAL
BLD PROD TYP BPU: NORMAL
BLD UNIT ID BPU: 0
BLD UNIT ID BPU: 0
BLOOD BANK CMNT PATIENT-IMP: ABNORMAL
BLOOD PRODUCT CODE: NORMAL
BLOOD PRODUCT CODE: NORMAL
BPU ID: NORMAL
BPU ID: NORMAL
NUM BPU REQUESTED: 2
RUBV IGG SERPL IA-ACNC: 7 IU/ML
SPECIMEN EXP DATE BLD: ABNORMAL
TRANSFUSION STATUS PATIENT QL: NORMAL

## 2019-09-11 ENCOUNTER — OFFICE VISIT (OUTPATIENT)
Dept: OBGYN | Facility: OTHER | Age: 24
End: 2019-09-11
Attending: OBSTETRICS & GYNECOLOGY
Payer: COMMERCIAL

## 2019-09-11 ENCOUNTER — HOSPITAL ENCOUNTER (OUTPATIENT)
Dept: OBGYN | Facility: OTHER | Age: 24
Discharge: HOME | End: 2019-09-11
Attending: OBSTETRICS & GYNECOLOGY | Admitting: OBSTETRICS & GYNECOLOGY
Payer: COMMERCIAL

## 2019-09-11 ENCOUNTER — LACTATION ENCOUNTER (OUTPATIENT)
Age: 24
End: 2019-09-11

## 2019-09-11 VITALS
BODY MASS INDEX: 29.38 KG/M2 | HEART RATE: 88 BPM | SYSTOLIC BLOOD PRESSURE: 146 MMHG | WEIGHT: 176.56 LBS | TEMPERATURE: 99.6 F | DIASTOLIC BLOOD PRESSURE: 100 MMHG

## 2019-09-11 VITALS — DIASTOLIC BLOOD PRESSURE: 98 MMHG | SYSTOLIC BLOOD PRESSURE: 150 MMHG

## 2019-09-11 DIAGNOSIS — O14.90 PRE-ECLAMPSIA, ANTEPARTUM: Primary | ICD-10-CM

## 2019-09-11 LAB
ALT SERPL W P-5'-P-CCNC: 27 U/L (ref 7–52)
AST SERPL W P-5'-P-CCNC: 29 U/L (ref 13–39)
CREAT SERPL-MCNC: 0.88 MG/DL (ref 0.6–1.2)
ERYTHROCYTE [DISTWIDTH] IN BLOOD BY AUTOMATED COUNT: 13.6 % (ref 10–15)
GFR SERPL CREATININE-BSD FRML MDRD: 80 ML/MIN/{1.73_M2}
HCT VFR BLD AUTO: 31.6 % (ref 35–47)
HGB BLD-MCNC: 10.1 G/DL (ref 11.7–15.7)
MCH RBC QN AUTO: 28.7 PG (ref 26.5–33)
MCHC RBC AUTO-ENTMCNC: 32 G/DL (ref 31.5–36.5)
MCV RBC AUTO: 90 FL (ref 78–100)
PLATELET # BLD AUTO: 437 10E9/L (ref 150–450)
RBC # BLD AUTO: 3.52 10E12/L (ref 3.8–5.2)
WBC # BLD AUTO: 9.8 10E9/L (ref 4–11)

## 2019-09-11 PROCEDURE — 84460 ALANINE AMINO (ALT) (SGPT): CPT | Mod: ZL | Performed by: OBSTETRICS & GYNECOLOGY

## 2019-09-11 PROCEDURE — 99207 ZZC OB VISIT-NO CHARGE - GICH ONLY: CPT | Performed by: OBSTETRICS & GYNECOLOGY

## 2019-09-11 PROCEDURE — S9443 LACTATION CLASS: HCPCS | Performed by: REGISTERED NURSE

## 2019-09-11 PROCEDURE — 36415 COLL VENOUS BLD VENIPUNCTURE: CPT | Mod: ZL | Performed by: OBSTETRICS & GYNECOLOGY

## 2019-09-11 PROCEDURE — 85027 COMPLETE CBC AUTOMATED: CPT | Mod: ZL | Performed by: OBSTETRICS & GYNECOLOGY

## 2019-09-11 PROCEDURE — 82565 ASSAY OF CREATININE: CPT | Mod: ZL | Performed by: OBSTETRICS & GYNECOLOGY

## 2019-09-11 PROCEDURE — 84450 TRANSFERASE (AST) (SGOT): CPT | Mod: ZL | Performed by: OBSTETRICS & GYNECOLOGY

## 2019-09-11 ASSESSMENT — ANXIETY QUESTIONNAIRES
1. FEELING NERVOUS, ANXIOUS, OR ON EDGE: MORE THAN HALF THE DAYS
IF YOU CHECKED OFF ANY PROBLEMS ON THIS QUESTIONNAIRE, HOW DIFFICULT HAVE THESE PROBLEMS MADE IT FOR YOU TO DO YOUR WORK, TAKE CARE OF THINGS AT HOME, OR GET ALONG WITH OTHER PEOPLE: VERY DIFFICULT
7. FEELING AFRAID AS IF SOMETHING AWFUL MIGHT HAPPEN: NEARLY EVERY DAY
6. BECOMING EASILY ANNOYED OR IRRITABLE: MORE THAN HALF THE DAYS
2. NOT BEING ABLE TO STOP OR CONTROL WORRYING: MORE THAN HALF THE DAYS
5. BEING SO RESTLESS THAT IT IS HARD TO SIT STILL: NOT AT ALL
3. WORRYING TOO MUCH ABOUT DIFFERENT THINGS: MORE THAN HALF THE DAYS
GAD7 TOTAL SCORE: 13

## 2019-09-11 ASSESSMENT — PATIENT HEALTH QUESTIONNAIRE - PHQ9
SUM OF ALL RESPONSES TO PHQ QUESTIONS 1-9: 1
5. POOR APPETITE OR OVEREATING: MORE THAN HALF THE DAYS

## 2019-09-11 ASSESSMENT — PAIN SCALES - GENERAL: PAINLEVEL: MODERATE PAIN (4)

## 2019-09-11 NOTE — LACTATION NOTE
This note was copied from a baby's chart.  Outpatient Lactation Visit    Meera Hart  [id number]    Consultation Date: 2019     Reason for Lactation Referral: Initial Lactation Consult    Baby's : 2019    Baby's Current Age: 5 day old  Baby's Gestational Age: Gestational Age: 37w1d    Primary Care Provider: No primary care provider on file.    Presenting Problem (concerns as stated by parent): no concerns    MATERNAL HISTORY   History of Breast Surgery: no  Breast Changes During Pregnancy: no  Breast Feeding History: nursed first child for 8 months exclusively  Maternal Meds: daily prenatal vitamin  Pregnancy Complications: pre-eclampsia   Anesthesia during labor: epidural    MATERNAL ASSESSMENT    Breast Size: average, symmetrical, soft after feeding and filling prior to feeding  Nipple Appearance - Left: slightly cracked, with signs of healing, education on further healing techniques provided  Nipple Appearance - Right: slightly cracked, with signs of healing, education on further healing techniques provided  Nipple Erectility - Left: erect with stimulation  Nipple Erectility - Right: erect with stimulation  Areolas Compressibility: soft  Nipple Size: average  Special Equipment Used: none  Day mother reports milk came in:  Day 3    INFANT ASSESSMENT    Oral Anatomy  Mouth: normal  Palate: normal  Jaw: normal  Tongue: normal  Frenulum: normal   Digital Suck Exam: root    FEEDING   Feeding Time: aggressively for 15 minutes  Position:  cradle  Effort to Latch: awake and alert, latched easily  Duration of Breast Feeding: Right Breast: 15 minutes; Left Breast: 0  Results: excellent breast feed    Volume of Intake:    Birth Weight: 6 lb 1.8 oz    Hospital discharge weight: 5 lb 13.4 oz    Today's Weight 5 lb 14.8 oz    Total Intake: 1.2 oz  Output: 4-5 soil diapers in last 24 hours, 4-5 wet diapers in last 24 hours    LATCH Score:   Latch: 2 - Good Latch  Audible Swallowin - Spontaneous &  frequent  Type of Nipple: (Breast/Nipple) 2 - Everted  Comfort: 2 - Soft, Nontender  Hold: 2 - No Assist   Total LATCH Score:  10    FEEDING PLAN    Home Feeding Plan: Continue to feed on demand when  elicits feeding cues with deep latch.  Babe should be eating 8-12 times in a 24 hour period.  Exclusivity explained and encouraged in the early weeks to establish breastfeeding and order in milk supply.  Rooming-in encouraged with explanation of the benefits.  Continue to apply expressed breast milk and Lanolin cream to nipples after feedings for healing and comfort.  Postpartum breastfeeding assessment completed and education provided.  Items included in the education are:     proper positioning and latch    effectiveness of feeding    manual expression    handling and storing breastmilk    maintenance of breastfeeding for the first 6 months    sign/symptoms of infant feeding issues requiring referral to qualified health care provider    LACTATION COMMENTS   Deep latch explained for proper positioning of breast in infant's mouth, maximizing milk transfer and comfort.  Reassurance and encouragement provided in regard to mom's concerns about milk supply.  Follow-up support information provided.  Parents plan to keep [place] Well-Child Check with Dr. Saeed as scheduled for 2 week well child check.      Face-to-face Time: 60 minutes with assessment and education.    Zoie Cho RN  2019  10:39 AM

## 2019-09-11 NOTE — PROGRESS NOTES
Follow-Up Visit    S: Ms. Nicky Hart is a 24 year old  PPD#5 s/p  c/b PPH and preeclampsia without severe features here for a BP check. She was in her lactation appointment today and was noted to have an elevated blood pressure. She reports headaches for the past few days, not improved with tylenol or ibuprofen. Does not seem to be positional. Has some blurry vision, mostly if she moves her head too fast. No flashing lights or lines. No upper abdominal pain. Her bleeding is minimal.    O:  BP (!) 146/88 (BP Location: Right arm, Patient Position: Sitting, Cuff Size: Adult Large)   Pulse 84   Temp 99.6  F (37.6  C) (Tympanic)   Wt 80.1 kg (176 lb 9 oz)   LMP 2018   BMI 29.38 kg/m    Gen: Well-appearing, NAD  Pulm: nonlabored  Ext: 1+ edema. 2+ DTRs    Component      Latest Ref Rng & Units 2019   WBC      4.0 - 11.0 10e9/L 9.8   RBC Count      3.8 - 5.2 10e12/L 3.52 (L)   Hemoglobin      11.7 - 15.7 g/dL 10.1 (L)   Hematocrit      35.0 - 47.0 % 31.6 (L)   MCV      78 - 100 fl 90   MCH      26.5 - 33.0 pg 28.7   MCHC      31.5 - 36.5 g/dL 32.0   RDW      10.0 - 15.0 % 13.6   Platelet Count      150 - 450 10e9/L 437   Creatinine      0.60 - 1.20 mg/dL 0.88   GFR Estimate      >60 mL/min/1.73:m2 80   GFR Estimate If Black      >60 mL/min/1.73:m2 >90   ALT      7 - 52 U/L 27   AST      13 - 39 U/L 29       A/P:  Ms. Nicky Hart is a 24 year old  here for postpartum hypertension. Has normal HELLP labs. BPs mild range. Encouraged patient to check at home and instructed when to call. Will have her f/u in 2 days for BP check with labs prior. Encouraged rest at home- her mom will take her toddler for now.     Zaira Moffett MD  OB/GYN  2019 12:22 PM

## 2019-09-11 NOTE — LACTATION NOTE
Nicky is here for a lactation consult and has had a pounding headache for 3 days and is having some blurred vision occasionally since yesterday.  BP reveals 150/98 in the sitting position.  Dr. Moon Moffett notified, patient status reported.  Patient will be seen in Unit [facility] following this appointment by Dr. Moon Moffett.

## 2019-09-11 NOTE — NURSING NOTE
"Chief Complaint   Patient presents with     Postpartum Care     headache, blurry vision        Initial BP (!) 146/88 (BP Location: Right arm, Patient Position: Sitting, Cuff Size: Adult Large)   Pulse 84   Temp 99.6  F (37.6  C) (Tympanic)   Wt 80.1 kg (176 lb 9 oz)   LMP 12/11/2018   BMI 29.38 kg/m   Estimated body mass index is 29.38 kg/m  as calculated from the following:    Height as of 8/24/19: 1.651 m (5' 5\").    Weight as of this encounter: 80.1 kg (176 lb 9 oz).  Medication Reconciliation: complete    Stella Boyd LPN  "

## 2019-09-12 ASSESSMENT — ANXIETY QUESTIONNAIRES: GAD7 TOTAL SCORE: 13

## 2019-09-13 ENCOUNTER — OFFICE VISIT (OUTPATIENT)
Dept: OBGYN | Facility: OTHER | Age: 24
End: 2019-09-13
Attending: OBSTETRICS & GYNECOLOGY
Payer: COMMERCIAL

## 2019-09-13 VITALS
TEMPERATURE: 98.3 F | SYSTOLIC BLOOD PRESSURE: 114 MMHG | DIASTOLIC BLOOD PRESSURE: 80 MMHG | HEART RATE: 108 BPM | WEIGHT: 171.06 LBS | BODY MASS INDEX: 28.47 KG/M2

## 2019-09-13 DIAGNOSIS — O14.90 PRE-ECLAMPSIA, ANTEPARTUM: ICD-10-CM

## 2019-09-13 LAB
ALT SERPL W P-5'-P-CCNC: 39 U/L (ref 7–52)
AST SERPL W P-5'-P-CCNC: 49 U/L (ref 13–39)
CREAT SERPL-MCNC: 0.85 MG/DL (ref 0.6–1.2)
ERYTHROCYTE [DISTWIDTH] IN BLOOD BY AUTOMATED COUNT: 13.3 % (ref 10–15)
GFR SERPL CREATININE-BSD FRML MDRD: 84 ML/MIN/{1.73_M2}
HCT VFR BLD AUTO: 36.3 % (ref 35–47)
HGB BLD-MCNC: 11.8 G/DL (ref 11.7–15.7)
MCH RBC QN AUTO: 28.8 PG (ref 26.5–33)
MCHC RBC AUTO-ENTMCNC: 32.5 G/DL (ref 31.5–36.5)
MCV RBC AUTO: 89 FL (ref 78–100)
PLATELET # BLD AUTO: 593 10E9/L (ref 150–450)
RBC # BLD AUTO: 4.1 10E12/L (ref 3.8–5.2)
WBC # BLD AUTO: 10.2 10E9/L (ref 4–11)

## 2019-09-13 PROCEDURE — 84460 ALANINE AMINO (ALT) (SGPT): CPT | Mod: ZL | Performed by: OBSTETRICS & GYNECOLOGY

## 2019-09-13 PROCEDURE — 99024 POSTOP FOLLOW-UP VISIT: CPT | Performed by: OBSTETRICS & GYNECOLOGY

## 2019-09-13 PROCEDURE — 36415 COLL VENOUS BLD VENIPUNCTURE: CPT | Mod: ZL | Performed by: OBSTETRICS & GYNECOLOGY

## 2019-09-13 PROCEDURE — 82565 ASSAY OF CREATININE: CPT | Mod: ZL | Performed by: OBSTETRICS & GYNECOLOGY

## 2019-09-13 PROCEDURE — 84450 TRANSFERASE (AST) (SGOT): CPT | Mod: ZL | Performed by: OBSTETRICS & GYNECOLOGY

## 2019-09-13 PROCEDURE — 85027 COMPLETE CBC AUTOMATED: CPT | Mod: ZL | Performed by: OBSTETRICS & GYNECOLOGY

## 2019-09-13 ASSESSMENT — PAIN SCALES - GENERAL: PAINLEVEL: MILD PAIN (2)

## 2019-09-13 NOTE — NURSING NOTE
"Chief Complaint   Patient presents with     RECHECK     hypertension, f/u labs       Initial /80 (BP Location: Right arm, Patient Position: Sitting, Cuff Size: Adult Large)   Pulse 108   Temp 98.3  F (36.8  C) (Tympanic)   Wt 77.6 kg (171 lb 1 oz)   LMP 12/11/2018   BMI 28.47 kg/m   Estimated body mass index is 28.47 kg/m  as calculated from the following:    Height as of 8/24/19: 1.651 m (5' 5\").    Weight as of this encounter: 77.6 kg (171 lb 1 oz).  Medication Reconciliation: complete    Stella Boyd LPN  "

## 2019-09-13 NOTE — PROGRESS NOTES
Follow-Up Visit    S: Ms. Nicky Hart is a 24 year old  PPD#7 s/p  c/b PPH and preeclampsia without severe features here for blood pressure check. She is feeling better. Still has a mild headache but improving and hasn't taken anything for it. Her feet are much less swollen.     O:  /80 (BP Location: Right arm, Patient Position: Sitting, Cuff Size: Adult Large)   Pulse 108   Temp 98.3  F (36.8  C) (Tympanic)   Wt 77.6 kg (171 lb 1 oz)   LMP 2018   BMI 28.47 kg/m    Gen: Well-appearing, NAD  Pulm: nonlabored  Psych: appropriate mood and affect    Component      Latest Ref Rng & Units 2019   WBC      4.0 - 11.0 10e9/L 9.8 10.2   RBC Count      3.8 - 5.2 10e12/L 3.52 (L) 4.10   Hemoglobin      11.7 - 15.7 g/dL 10.1 (L) 11.8   Hematocrit      35.0 - 47.0 % 31.6 (L) 36.3   MCV      78 - 100 fl 90 89   MCH      26.5 - 33.0 pg 28.7 28.8   MCHC      31.5 - 36.5 g/dL 32.0 32.5   RDW      10.0 - 15.0 % 13.6 13.3   Platelet Count      150 - 450 10e9/L 437 593 (H)   Creatinine      0.60 - 1.20 mg/dL 0.88 0.85   GFR Estimate      >60 mL/min/1.73:m2 80 84   GFR Estimate If Black      >60 mL/min/1.73:m2 >90 >90   ALT      7 - 52 U/L 27 39   AST      13 - 39 U/L 29 49 (H)       A/P:  Ms. Nicky Hart is a 24 year old  here for BP check, labs. Weight down 5 lbs in 2 days, BP improved and patient is feeling better. AST slightly elevated but not twice the upper limit of normal. Recommend continued monitoring with repeat BP check and labs on     Zaira Moffett MD  OB/GYN  2019 9:36 AM

## 2019-09-14 DIAGNOSIS — K21.00 GASTROESOPHAGEAL REFLUX DISEASE WITH ESOPHAGITIS: ICD-10-CM

## 2019-09-16 ENCOUNTER — OFFICE VISIT (OUTPATIENT)
Dept: OBGYN | Facility: OTHER | Age: 24
End: 2019-09-16
Attending: OBSTETRICS & GYNECOLOGY
Payer: COMMERCIAL

## 2019-09-16 VITALS
DIASTOLIC BLOOD PRESSURE: 78 MMHG | HEART RATE: 92 BPM | WEIGHT: 170.6 LBS | BODY MASS INDEX: 28.39 KG/M2 | SYSTOLIC BLOOD PRESSURE: 118 MMHG

## 2019-09-16 DIAGNOSIS — O16.9 HYPERTENSION IN DELIVERED PREGNANCY: Primary | ICD-10-CM

## 2019-09-16 LAB
ALT SERPL W P-5'-P-CCNC: 43 U/L (ref 7–52)
AST SERPL W P-5'-P-CCNC: 40 U/L (ref 13–39)
CREAT SERPL-MCNC: 0.9 MG/DL (ref 0.6–1.2)
ERYTHROCYTE [DISTWIDTH] IN BLOOD BY AUTOMATED COUNT: 13 % (ref 10–15)
GFR SERPL CREATININE-BSD FRML MDRD: 78 ML/MIN/{1.73_M2}
HCT VFR BLD AUTO: 37.4 % (ref 35–47)
HGB BLD-MCNC: 12.2 G/DL (ref 11.7–15.7)
MCH RBC QN AUTO: 28.5 PG (ref 26.5–33)
MCHC RBC AUTO-ENTMCNC: 32.6 G/DL (ref 31.5–36.5)
MCV RBC AUTO: 87 FL (ref 78–100)
PLATELET # BLD AUTO: 633 10E9/L (ref 150–450)
RBC # BLD AUTO: 4.28 10E12/L (ref 3.8–5.2)
WBC # BLD AUTO: 9.1 10E9/L (ref 4–11)

## 2019-09-16 PROCEDURE — 36415 COLL VENOUS BLD VENIPUNCTURE: CPT | Mod: ZL | Performed by: OBSTETRICS & GYNECOLOGY

## 2019-09-16 PROCEDURE — 85027 COMPLETE CBC AUTOMATED: CPT | Mod: ZL | Performed by: OBSTETRICS & GYNECOLOGY

## 2019-09-16 PROCEDURE — 99024 POSTOP FOLLOW-UP VISIT: CPT | Performed by: OBSTETRICS & GYNECOLOGY

## 2019-09-16 PROCEDURE — 84460 ALANINE AMINO (ALT) (SGPT): CPT | Mod: ZL | Performed by: OBSTETRICS & GYNECOLOGY

## 2019-09-16 PROCEDURE — 82565 ASSAY OF CREATININE: CPT | Mod: ZL | Performed by: OBSTETRICS & GYNECOLOGY

## 2019-09-16 PROCEDURE — 84450 TRANSFERASE (AST) (SGOT): CPT | Mod: ZL | Performed by: OBSTETRICS & GYNECOLOGY

## 2019-09-16 RX ORDER — FAMOTIDINE 20 MG/1
20 TABLET, FILM COATED ORAL 2 TIMES DAILY
Qty: 60 TABLET | Refills: 1 | Status: SHIPPED | OUTPATIENT
Start: 2019-09-16 | End: 2019-10-10

## 2019-09-16 ASSESSMENT — PAIN SCALES - GENERAL: PAINLEVEL: MILD PAIN (2)

## 2019-09-16 NOTE — PROGRESS NOTES
CC: postpartum exam at 2 weeks from delivery    HPI: Nicky Hart presents for postpartum exam. Baby was born by vaginal delivery. Complications included postpartum hemorrhage requiring Bakri balloon placement, and severe preeclampsia with elevated LFT's postpartum. No headaches or visual change today. No RUQ pain.    Mood:OK    Breastfeeding:yes  Incision(s): none  Bleeding: no  Birthcontrol: considering micronor    Past Medical History:   Diagnosis Date     Chronic cough 2002    resolved     Closed fracture of lower end of radius 2006    L arm     History of early menarche 2007     Pregnancy          Past Surgical History:   Procedure Laterality Date     DILATION AND CURETTAGE N/A 2017    Dr. Hazel; [facility] - retained placenta/endometritis     Current Outpatient Medications   Medication     citalopram (CELEXA) 10 MG tablet     EPINEPHrine (EPIPEN/ADRENACLICK/OR ANY BX GENERIC EQUIV) 0.3 MG/0.3ML injection 2-pack     ibuprofen (ADVIL/MOTRIN) 600 MG tablet     Prenatal Vit-Fe Fumarate-FA (PRENATAL VITAMIN) 27-0.8 MG TABS     triamcinolone (KENALOG) 0.1 % external ointment     No current facility-administered medications for this visit.       Allergies   Allergen Reactions     Bermuda Grass Itching     Dust Mite Extract Itching     Mold Itching     Pollen Extract Itching     Tree Nuts  [Nuts] Itching       REVIEW OF SYSTEMS:  Neg for bowel, bladder, and breast    O: /78 (BP Location: Right arm)   Pulse 92   Wt 77.4 kg (170 lb 9.6 oz)   LMP 2018   Breastfeeding? Yes   BMI 28.39 kg/m    Body mass index is 28.39 kg/m .    Exam:  Constitutional: healthy, alert, active and no distress    PHQ-9 score:    PHQ-9 SCORE 2019   PHQ-9 Total Score 1       Results for orders placed or performed in visit on 19   CBC W PLT No Diff   Result Value Ref Range    WBC 9.1 4.0 - 11.0 10e9/L    RBC Count 4.28 3.8 - 5.2 10e12/L    Hemoglobin 12.2 11.7 - 15.7 g/dL    Hematocrit 37.4 35.0 - 47.0  %    MCV 87 78 - 100 fl    MCH 28.5 26.5 - 33.0 pg    MCHC 32.6 31.5 - 36.5 g/dL    RDW 13.0 10.0 - 15.0 %    Platelet Count 633 (H) 150 - 450 10e9/L   Creatinine   Result Value Ref Range    Creatinine 0.90 0.60 - 1.20 mg/dL    GFR Estimate 78 >60 mL/min/[1.73_m2]    GFR Estimate If Black >90 >60 mL/min/[1.73_m2]   ALT (SGPT)   Result Value Ref Range    ALT 43 7 - 52 U/L   AST (SGOT)   Result Value Ref Range    AST 40 (H) 13 - 39 U/L         I/P:  Postpartum visit for BP check  LFT's are trending to normal and she feels well.  Suspect her PPH may have been secondary to low lying placentation.  Recheck in one month    Jeremías Hazel MD FACOG  11:36 AM 9/16/2019

## 2019-10-02 DIAGNOSIS — K21.00 GASTROESOPHAGEAL REFLUX DISEASE WITH ESOPHAGITIS: ICD-10-CM

## 2019-10-02 NOTE — TELEPHONE ENCOUNTER
Patient is requesting 90 day supply of her famotidine 20 mg.  Elisa Moffett LPN  10/2/2019 4:52 PM

## 2019-10-08 RX ORDER — FAMOTIDINE 20 MG/1
20 TABLET, FILM COATED ORAL 2 TIMES DAILY
Qty: 60 TABLET | Refills: 1 | OUTPATIENT
Start: 2019-10-08

## 2019-10-08 NOTE — TELEPHONE ENCOUNTER
Pepcid refilled on 9/16/19 #60 x 1 refill to [facility]   Esme Moffett RN on 10/8/2019 at 4:15 PM

## 2019-10-10 DIAGNOSIS — K21.00 GASTROESOPHAGEAL REFLUX DISEASE WITH ESOPHAGITIS: ICD-10-CM

## 2019-10-10 RX ORDER — FAMOTIDINE 20 MG/1
20 TABLET, FILM COATED ORAL 2 TIMES DAILY
Qty: 90 TABLET | Refills: 1 | Status: SHIPPED | OUTPATIENT
Start: 2019-10-10 | End: 2019-10-15

## 2019-10-10 NOTE — TELEPHONE ENCOUNTER
Patient is requesting a 90 day refill of famotidine. Elisa Pereira LPN .......................10/10/2019  9:17 AM

## 2019-10-15 ENCOUNTER — TELEPHONE (OUTPATIENT)
Dept: OBGYN | Facility: OTHER | Age: 24
End: 2019-10-15

## 2019-10-15 ENCOUNTER — PRENATAL OFFICE VISIT (OUTPATIENT)
Dept: OBGYN | Facility: OTHER | Age: 24
End: 2019-10-15
Attending: OBSTETRICS & GYNECOLOGY
Payer: COMMERCIAL

## 2019-10-15 VITALS
HEART RATE: 78 BPM | WEIGHT: 163.6 LBS | SYSTOLIC BLOOD PRESSURE: 108 MMHG | DIASTOLIC BLOOD PRESSURE: 68 MMHG | BODY MASS INDEX: 27.22 KG/M2

## 2019-10-15 DIAGNOSIS — Z23 NEEDS FLU SHOT: Primary | ICD-10-CM

## 2019-10-15 DIAGNOSIS — Z30.011 ENCOUNTER FOR INITIAL PRESCRIPTION OF CONTRACEPTIVE PILLS: ICD-10-CM

## 2019-10-15 PROCEDURE — 99207 ZZC POST-PARTUM 6 WK VISIT - GICH ONLY: CPT | Performed by: OBSTETRICS & GYNECOLOGY

## 2019-10-15 PROCEDURE — 90471 IMMUNIZATION ADMIN: CPT | Performed by: OBSTETRICS & GYNECOLOGY

## 2019-10-15 PROCEDURE — 90686 IIV4 VACC NO PRSV 0.5 ML IM: CPT | Performed by: OBSTETRICS & GYNECOLOGY

## 2019-10-15 RX ORDER — ACETAMINOPHEN AND CODEINE PHOSPHATE 120; 12 MG/5ML; MG/5ML
0.35 SOLUTION ORAL DAILY
Qty: 84 TABLET | Refills: 3 | Status: SHIPPED | OUTPATIENT
Start: 2019-10-15 | End: 2019-10-15

## 2019-10-15 RX ORDER — ACETAMINOPHEN AND CODEINE PHOSPHATE 120; 12 MG/5ML; MG/5ML
0.35 SOLUTION ORAL DAILY
Qty: 84 TABLET | Refills: 3 | Status: SHIPPED | OUTPATIENT
Start: 2019-10-15 | End: 2020-01-15

## 2019-10-15 ASSESSMENT — PAIN SCALES - GENERAL: PAINLEVEL: NO PAIN (0)

## 2019-10-15 NOTE — NURSING NOTE
"Chief Complaint   Patient presents with     Postpartum Care      19       Initial /68 (BP Location: Right arm)   Pulse 78   Wt 74.2 kg (163 lb 9.6 oz)   LMP 2018   Breastfeeding? Yes   BMI 27.22 kg/m   Estimated body mass index is 27.22 kg/m  as calculated from the following:    Height as of 19: 1.651 m (5' 5\").    Weight as of this encounter: 74.2 kg (163 lb 9.6 oz).  Medication Reconciliation: Completed    Tatyana Acevedo LPN............. 10/15/2019 11:39 AM     Immunization Documentation    Prior to Immunization administration, verified patients identity using patient's name and date of birth. Please see IMMUNIZATIONS  and order for additional information.  Patient / Parent instructed to remain in clinic for 15 minutes and report any adverse reaction to staff immediately.    Was entire vial of medication used? Yes  Vial/Syringe: Syringe    Tatyana Acevedo LPN  10/15/2019   11:39 AM    "

## 2019-10-15 NOTE — PROGRESS NOTES
CC: postpartum exam at 6 weeks from delivery    HPI: Nicky Hart presents for postpartum exam. Baby was born by vaginal delivery. Complications included postpartum hemorrhage. She has issues with urgency at times and concerns over the appearance of her vulva.  Mood:OK now    Breastfeeding:yes  Incision(s): no  Bleeding: no  Birthcontrol: micronor    Past Medical History:   Diagnosis Date     Chronic cough 2002    resolved     Closed fracture of lower end of radius 2006    L arm     History of early menarche 2007     Pregnancy          Past Surgical History:   Procedure Laterality Date     DILATION AND CURETTAGE N/A 2017    Dr. Hazel; [facility] - retained placenta/endometritis     Current Outpatient Medications   Medication     EPINEPHrine (EPIPEN/ADRENACLICK/OR ANY BX GENERIC EQUIV) 0.3 MG/0.3ML injection 2-pack     norethindrone (MICRONOR) 0.35 MG tablet     No current facility-administered medications for this visit.       Allergies   Allergen Reactions     Bermuda Grass Itching     Dust Mite Extract Itching     Mold Itching     Pollen Extract Itching     Tree Nuts  [Nuts] Itching       REVIEW OF SYSTEMS:  Neg for bowel, bladder, and breast    O: /68 (BP Location: Right arm)   Pulse 78   Wt 74.2 kg (163 lb 9.6 oz)   LMP 2018   Breastfeeding? Yes   BMI 27.22 kg/m    Body mass index is 27.22 kg/m .    Exam:  Constitutional: healthy, alert, active and no distress  Pelvic exam: normal vagina and vulva, gaping introitus, normal cervix without lesions or tenderness, uterus normal size anteverted, adenxa normal in size without tenderness, pap smear done, exam chaperoned by nurse. G2 cystocele with valsalva.    PHQ-9 score:    PHQ-9 SCORE 2019   PHQ-9 Total Score 1       Results for orders placed or performed in visit on 19   CBC W PLT No Diff   Result Value Ref Range    WBC 9.1 4.0 - 11.0 10e9/L    RBC Count 4.28 3.8 - 5.2 10e12/L    Hemoglobin 12.2 11.7 - 15.7 g/dL     Hematocrit 37.4 35.0 - 47.0 %    MCV 87 78 - 100 fl    MCH 28.5 26.5 - 33.0 pg    MCHC 32.6 31.5 - 36.5 g/dL    RDW 13.0 10.0 - 15.0 %    Platelet Count 633 (H) 150 - 450 10e9/L   Creatinine   Result Value Ref Range    Creatinine 0.90 0.60 - 1.20 mg/dL    GFR Estimate 78 >60 mL/min/[1.73_m2]    GFR Estimate If Black >90 >60 mL/min/[1.73_m2]   ALT (SGPT)   Result Value Ref Range    ALT 43 7 - 52 U/L   AST (SGOT)   Result Value Ref Range    AST 40 (H) 13 - 39 U/L         I/P:  Postpartum visit.    Resume annual preventive healthcare. Continue PNV's until done with childbearing.    (Z23) Needs flu shot  (primary encounter diagnosis)  Comment:   Plan: GH-IMM- FLU VAC PRESRV FREE QUAD SPLIT VIR > 6         MONTHS IM            (Z30.011) Encounter for initial prescription of contraceptive pills  Comment:   Plan: norethindrone (MICRONOR) 0.35 MG tablet          Discussed vaginal repairs after childbearing is complete for cystocele.  Discussed medical management of OAB    RTC prn    Jeremías Hazel MD FACOG  11:48 AM 10/15/2019

## 2019-10-15 NOTE — TELEPHONE ENCOUNTER
Prescription printed and faxed to pharmacy.    Left message to call back  ....................Francie Brewster RN  10/15/2019   12:48 PM

## 2019-12-19 ENCOUNTER — OFFICE VISIT (OUTPATIENT)
Dept: OBGYN | Facility: OTHER | Age: 24
End: 2019-12-19
Attending: OBSTETRICS & GYNECOLOGY
Payer: COMMERCIAL

## 2019-12-19 VITALS
BODY MASS INDEX: 26.89 KG/M2 | WEIGHT: 161.6 LBS | HEART RATE: 71 BPM | SYSTOLIC BLOOD PRESSURE: 100 MMHG | DIASTOLIC BLOOD PRESSURE: 80 MMHG | OXYGEN SATURATION: 99 %

## 2019-12-19 DIAGNOSIS — Z30.09 BIRTH CONTROL COUNSELING: ICD-10-CM

## 2019-12-19 DIAGNOSIS — F43.23 ADJUSTMENT DISORDER WITH MIXED ANXIETY AND DEPRESSED MOOD: Primary | ICD-10-CM

## 2019-12-19 PROCEDURE — 99214 OFFICE O/P EST MOD 30 MIN: CPT | Performed by: OBSTETRICS & GYNECOLOGY

## 2019-12-19 RX ORDER — ALPRAZOLAM 0.25 MG
0.25 TABLET ORAL 3 TIMES DAILY PRN
Qty: 10 TABLET | Refills: 1 | Status: SHIPPED | OUTPATIENT
Start: 2019-12-19 | End: 2021-03-17

## 2019-12-19 RX ORDER — BUPROPION HYDROCHLORIDE 150 MG/1
150 TABLET ORAL EVERY MORNING
Qty: 30 TABLET | Refills: 3 | Status: SHIPPED | OUTPATIENT
Start: 2019-12-19 | End: 2020-02-12

## 2019-12-19 ASSESSMENT — PATIENT HEALTH QUESTIONNAIRE - PHQ9: 5. POOR APPETITE OR OVEREATING: NEARLY EVERY DAY

## 2019-12-19 ASSESSMENT — ANXIETY QUESTIONNAIRES
2. NOT BEING ABLE TO STOP OR CONTROL WORRYING: NEARLY EVERY DAY
5. BEING SO RESTLESS THAT IT IS HARD TO SIT STILL: NEARLY EVERY DAY
3. WORRYING TOO MUCH ABOUT DIFFERENT THINGS: NEARLY EVERY DAY
GAD7 TOTAL SCORE: 21
1. FEELING NERVOUS, ANXIOUS, OR ON EDGE: NEARLY EVERY DAY
7. FEELING AFRAID AS IF SOMETHING AWFUL MIGHT HAPPEN: NEARLY EVERY DAY
6. BECOMING EASILY ANNOYED OR IRRITABLE: NEARLY EVERY DAY
IF YOU CHECKED OFF ANY PROBLEMS ON THIS QUESTIONNAIRE, HOW DIFFICULT HAVE THESE PROBLEMS MADE IT FOR YOU TO DO YOUR WORK, TAKE CARE OF THINGS AT HOME, OR GET ALONG WITH OTHER PEOPLE: VERY DIFFICULT

## 2019-12-19 ASSESSMENT — PAIN SCALES - GENERAL: PAINLEVEL: NO PAIN (0)

## 2019-12-19 NOTE — NURSING NOTE
"Chief Complaint   Patient presents with     Postpartum Care     3 month check up/ Anxiety Issues       Initial /80 (BP Location: Right arm, Patient Position: Sitting, Cuff Size: Adult Regular)   Pulse 71   Wt 73.3 kg (161 lb 9.6 oz)   LMP  (LMP Unknown)   SpO2 99%   Breastfeeding Yes   BMI 26.89 kg/m   Estimated body mass index is 26.89 kg/m  as calculated from the following:    Height as of 8/24/19: 1.651 m (5' 5\").    Weight as of this encounter: 73.3 kg (161 lb 9.6 oz).  Medication Reconciliation: complete    Blanche Luu LPN  "

## 2019-12-19 NOTE — PROGRESS NOTES
CC: Anxiety and Depression, birth control  HPI:  Can is a 24 year old female who presents for follow up with issues with anxiety and depression. She went off of celexa recently due to headaches. She is forgetting her birth control pills some times. She describes a sense of panic over leaving her baby with anyone. She trusts her mom and her sister, but not her mother in law. She feels depressed and anxious all the time.  She is not suicidal. She would like to try a different medication. She has not set up follow up with a counselor in mental health yet.  She has had sex once since baby and noted it was painful. She has two children under three.  She is breastfeeding but just barely keeping up with baby, and is with baby all the time to support nursing. She is anxious about failing at nursing.  Her bladder is no longer leaking with laugh or cough. She had concerns over a gaping introitus with her postpartum visit, and she has not looked at her vulva since then.    No LMP recorded (lmp unknown).      OB History    Para Term  AB Living   2 2 2 0 0 2   SAB TAB Ectopic Multiple Live Births   0 0 0 0 2      # Outcome Date GA Lbr Surinder/2nd Weight Sex Delivery Anes PTL Lv   2 Term 19 37w1d / 00:09 2.773 kg (6 lb 1.8 oz) F Vag-Spont EPI  NINA      Complications: Preeclampsia/Hypertension, Third-stage postpartum hemorrhage      Name: MICHAEL LIVE-CAN      Apgar1: 7  Apgar5: 8   1 Term 04/10/17 40w0d  3.289 kg (7 lb 4 oz) M IVD EPI N NINA     Past Medical History:   Diagnosis Date     Chronic cough 2002    resolved     Closed fracture of lower end of radius 2006    L arm     History of early menarche 2007     Pregnancy          Past Surgical History:   Procedure Laterality Date     DILATION AND CURETTAGE N/A 2017    Dr. Hazel; [facility] - retained placenta/endometritis     Social History     Socioeconomic History     Marital status:      Spouse name: Not on file     Number of  children: Not on file     Years of education: Not on file     Highest education level: Not on file   Occupational History     Not on file   Social Needs     Financial resource strain: Not on file     Food insecurity:     Worry: Not on file     Inability: Not on file     Transportation needs:     Medical: Not on file     Non-medical: Not on file   Tobacco Use     Smoking status: Never Smoker     Smokeless tobacco: Never Used   Substance and Sexual Activity     Alcohol use: No     Alcohol/week: 0.0 standard drinks     Frequency: Never     Comment: Alcoholic Drinks/day: rare     Drug use: No     Comment: Drug use: No     Sexual activity: Yes     Partners: Male     Birth control/protection: None   Lifestyle     Physical activity:     Days per week: Not on file     Minutes per session: Not on file     Stress: Not on file   Relationships     Social connections:     Talks on phone: Not on file     Gets together: Not on file     Attends Sabianist service: Not on file     Active member of club or organization: Not on file     Attends meetings of clubs or organizations: Not on file     Relationship status: Not on file     Intimate partner violence:     Fear of current or ex partner: Not on file     Emotionally abused: Not on file     Physically abused: Not on file     Forced sexual activity: Not on file   Other Topics Concern     Parent/sibling w/ CABG, MI or angioplasty before 65F 55M? Not Asked   Social History Narrative    Attends [employer] fall 2014.     Doni Mathew-stepfather. Now  from her mother and out of the home.    Minimal contact with her biological father in [place].     Kiley Hayes Mother    Only child    Worked at [employer] in [facility]; now at [employer].     Family History   Problem Relation Age of Onset     GERD Mother      GERD Maternal Grandmother      Ulcerative Colitis Maternal Grandfather      Current Outpatient Medications   Medication     ALPRAZolam (XANAX) 0.25 MG tablet      buPROPion (WELLBUTRIN XL) 150 MG 24 hr tablet     EPINEPHrine (EPIPEN/ADRENACLICK/OR ANY BX GENERIC EQUIV) 0.3 MG/0.3ML injection 2-pack     norethindrone (MICRONOR) 0.35 MG tablet     No current facility-administered medications for this visit.      Allergies   Allergen Reactions     Bermuda Grass Itching     Dust Mite Extract Itching     Mold Itching     Pollen Extract Itching     Tree Nuts  [Nuts] Itching     /80 (BP Location: Right arm, Patient Position: Sitting, Cuff Size: Adult Regular)   Pulse 71   Wt 73.3 kg (161 lb 9.6 oz)   LMP  (LMP Unknown)   SpO2 99%   Breastfeeding Yes   BMI 26.89 kg/m      REVIEW OF SYSTEMS  Neg except as above    Exam:  Constitutional: healthy, alert, active and no distress  PHQ-9 SCORE 12/3/2018 2/26/2019 9/11/2019   PHQ-9 Total Score 1 0 1     GAYLE-7 SCORE 7/25/2019 9/11/2019 12/19/2019   Total Score 16 13 21         Lab: No results found for any visits on 12/19/19.    ASSESSMENT/PLAN :  1. Adjustment disorder with mixed anxiety and depressed mood      Will trial Wellbutrin XR for the next three weeks. Recheck then, and plan for Mirena IUD placement under US guidance. She has history of expulsion of an IUD in the past. She was also given xanax as a rescue med prn for panic attacks.  She was given the numbers to our local mental health counselors as I believe she would benefit from CBT, and mindfullness meditation training for her anxiety.    TT:30 minutes with over half spent in discussion of management of depression and anxiety, and birth control management.    Jeremías Hazel MD FACOG  2:18 PM 12/19/2019

## 2019-12-20 ASSESSMENT — ANXIETY QUESTIONNAIRES: GAD7 TOTAL SCORE: 21

## 2020-01-11 ENCOUNTER — OFFICE VISIT (OUTPATIENT)
Dept: FAMILY MEDICINE | Facility: OTHER | Age: 25
End: 2020-01-11
Attending: PHYSICIAN ASSISTANT
Payer: COMMERCIAL

## 2020-01-11 ENCOUNTER — HOSPITAL ENCOUNTER (OUTPATIENT)
Dept: GENERAL RADIOLOGY | Facility: OTHER | Age: 25
Discharge: HOME | End: 2020-01-11
Attending: PHYSICIAN ASSISTANT | Admitting: PHYSICIAN ASSISTANT
Payer: COMMERCIAL

## 2020-01-11 VITALS
HEIGHT: 65 IN | SYSTOLIC BLOOD PRESSURE: 104 MMHG | RESPIRATION RATE: 16 BRPM | HEART RATE: 99 BPM | WEIGHT: 159.8 LBS | BODY MASS INDEX: 26.62 KG/M2 | OXYGEN SATURATION: 99 % | TEMPERATURE: 98.5 F | DIASTOLIC BLOOD PRESSURE: 80 MMHG

## 2020-01-11 DIAGNOSIS — M79.662 PAIN OF LEFT LOWER LEG: Primary | ICD-10-CM

## 2020-01-11 DIAGNOSIS — M79.662 PAIN OF LEFT LOWER LEG: ICD-10-CM

## 2020-01-11 PROCEDURE — 99213 OFFICE O/P EST LOW 20 MIN: CPT | Performed by: PHYSICIAN ASSISTANT

## 2020-01-11 PROCEDURE — 73590 X-RAY EXAM OF LOWER LEG: CPT | Mod: LT

## 2020-01-11 ASSESSMENT — ANXIETY QUESTIONNAIRES
6. BECOMING EASILY ANNOYED OR IRRITABLE: NOT AT ALL
3. WORRYING TOO MUCH ABOUT DIFFERENT THINGS: NOT AT ALL
IF YOU CHECKED OFF ANY PROBLEMS ON THIS QUESTIONNAIRE, HOW DIFFICULT HAVE THESE PROBLEMS MADE IT FOR YOU TO DO YOUR WORK, TAKE CARE OF THINGS AT HOME, OR GET ALONG WITH OTHER PEOPLE: NOT DIFFICULT AT ALL
GAD7 TOTAL SCORE: 0
5. BEING SO RESTLESS THAT IT IS HARD TO SIT STILL: NOT AT ALL
1. FEELING NERVOUS, ANXIOUS, OR ON EDGE: NOT AT ALL
7. FEELING AFRAID AS IF SOMETHING AWFUL MIGHT HAPPEN: NOT AT ALL
2. NOT BEING ABLE TO STOP OR CONTROL WORRYING: NOT AT ALL

## 2020-01-11 ASSESSMENT — PAIN SCALES - GENERAL: PAINLEVEL: MODERATE PAIN (5)

## 2020-01-11 ASSESSMENT — MIFFLIN-ST. JEOR: SCORE: 1485.73

## 2020-01-11 ASSESSMENT — PATIENT HEALTH QUESTIONNAIRE - PHQ9: 5. POOR APPETITE OR OVEREATING: NOT AT ALL

## 2020-01-11 NOTE — PROGRESS NOTES
"SUBJECTIVE:  Nicky Hart is a 24 year old female [facility] for evaluation of left leg pain  Onset about 6 days ago, course is gradually worsening  Associated symptoms: pain medial distal tibial on left leg  Mechanism of injury: she began running 2 weeks ago, running on treadmill 45 minutes 3-5 days a week, pain started about a week in. She is also using weights with legs but does not notice any pain with this.     Location - medial left leg  Quality - sharp pain, intermittent  Severity - 5/10  Aggravated by - bearing weight medial foot and running  Alleviated by - rest  Associated symptoms - denies redness, swelling, warmth, bruising  Treatments - nothing tried  Prior fracture or injury - none    She has a 4 month baby that she is breast feeding      Past Medical History:   Diagnosis Date     Chronic cough 2002    resolved     Closed fracture of lower end of radius 2006    L arm     History of early menarche 2007     Pregnancy          Current Outpatient Medications   Medication     ALPRAZolam (XANAX) 0.25 MG tablet     buPROPion (WELLBUTRIN XL) 150 MG 24 hr tablet     EPINEPHrine (EPIPEN/ADRENACLICK/OR ANY BX GENERIC EQUIV) 0.3 MG/0.3ML injection 2-pack     norethindrone (MICRONOR) 0.35 MG tablet     No current facility-administered medications for this visit.         Allergies   Allergen Reactions     Bermuda Grass Itching     Dust Mite Extract Itching     Mold Itching     Pollen Extract Itching     Tree Nuts  [Nuts] Itching         ROS  General: feels well, no fever  Musculoskeletal: injury per HPI      OBJECTIVE:  Vitals:    20 1356   BP: 104/80   BP Location: Right arm   Patient Position: Sitting   Cuff Size: Adult Regular   Pulse: 99   Resp: 16   Temp: 98.5  F (36.9  C)   TempSrc: Tympanic   SpO2: 99%   Weight: 72.5 kg (159 lb 12.8 oz)   Height: 1.651 m (5' 5\")     Vital signs as noted above.  Appearance: in no apparent distress.  Musculoskeletal: left leg  Inspection:  " No noted abnormality  Palpation: TTP medial distal tibia  Neurovascular: normal pulses, cap refill, sensation to soft touch, temperature  ROM: normal    Normal foot/ankle    Recent Results (from the past 24 hour(s))   XR Tibia & Fibula Left 2 Views    Narrative    EXAM:XR TIBIA & FIBULA LT 2 VW     CLINICAL HISTORY: Patient Age  24 years Additional clinical info: pain  distal tibial, sharp pain with ambulating/running; Pain of left lower  leg     COMPARISON: None      TECHNIQUE: 2 views      Impression    IMPRESSION: Normal left tibia and fibula. No appreciable soft tissue  swelling.    SHAILA STOCKTON MD         ASSESSMENT:  (M54.163) Pain of left lower leg  (primary encounter diagnosis)  Plan: XR Tibia & Fibula Left 2 Views, SPORTS MEDICINE        REFERRAL    Medial distal tibia pain after initiating running program  No abnormality on exam  XR tib/fib no fracture, image independently reviewed  Discussed symptomatic treatments  Rest - avoid running until symptoms resolve or you are seen by sports medicine  Can do non weight bearing activity such as bicycle if you don't have any pain with this  Ibuprofen 600 mg every 6-8 hours, as needed for pain/inflammation  Ice 10-20 minutes every 1-2 hours  Topical icy hot take as directed  Referral to sports medicine they will call you to arrange an appointment  Seek immediate care for increased pain, swelling, warmth, fever  Patient received verbal and written instruction including review of warning signs    Lashawn Bledsoe PA-C on 1/11/2020 at 4:07 PM

## 2020-01-11 NOTE — NURSING NOTE
Patient has not been feeling well for 6 days.  Their symptoms are left lower leg pain.   Radha Guevara LPN LPN....................  1/11/2020   1:55 PM

## 2020-01-11 NOTE — PATIENT INSTRUCTIONS
Medial distal tibia pain  XR tib/fib no fracture  Treatment  Rest - avoid running until symptoms resolve or you are seen by sports medicine  Ibuprofen 600 mg every 6-8 hours, as needed for pain/inflammation  Ice 10-20 minutes every 1-2 hours  Topical icy hot take as directed  Referral to sports medicine they will call you to arrange an appointment  Seek immediate care for increased pain, swelling, warmth, fever

## 2020-01-12 ASSESSMENT — ANXIETY QUESTIONNAIRES: GAD7 TOTAL SCORE: 0

## 2020-01-15 ENCOUNTER — OFFICE VISIT (OUTPATIENT)
Dept: FAMILY MEDICINE | Facility: OTHER | Age: 25
End: 2020-01-15
Attending: FAMILY MEDICINE
Payer: COMMERCIAL

## 2020-01-15 DIAGNOSIS — M79.662 PAIN OF LEFT LOWER LEG: Primary | ICD-10-CM

## 2020-01-15 PROCEDURE — 99213 OFFICE O/P EST LOW 20 MIN: CPT | Performed by: FAMILY MEDICINE

## 2020-01-15 ASSESSMENT — MIFFLIN-ST. JEOR: SCORE: 1476.66

## 2020-01-15 ASSESSMENT — PATIENT HEALTH QUESTIONNAIRE - PHQ9: 5. POOR APPETITE OR OVEREATING: NOT AT ALL

## 2020-01-15 ASSESSMENT — ANXIETY QUESTIONNAIRES
5. BEING SO RESTLESS THAT IT IS HARD TO SIT STILL: NOT AT ALL
2. NOT BEING ABLE TO STOP OR CONTROL WORRYING: NOT AT ALL
GAD7 TOTAL SCORE: 0
1. FEELING NERVOUS, ANXIOUS, OR ON EDGE: NOT AT ALL
3. WORRYING TOO MUCH ABOUT DIFFERENT THINGS: NOT AT ALL
6. BECOMING EASILY ANNOYED OR IRRITABLE: NOT AT ALL
7. FEELING AFRAID AS IF SOMETHING AWFUL MIGHT HAPPEN: NOT AT ALL

## 2020-01-15 ASSESSMENT — PAIN SCALES - GENERAL: PAINLEVEL: NO PAIN (0)

## 2020-01-15 NOTE — NURSING NOTE
Patient here for pain in the left shin that started 2 weeks ago after running on the tread mill. Medication Reconciliation: complete.    Elisa Pereira LPN  1/15/2020 10:43 AM

## 2020-01-16 ASSESSMENT — ANXIETY QUESTIONNAIRES: GAD7 TOTAL SCORE: 0

## 2020-01-16 NOTE — PROGRESS NOTES
"Left leg pain  SUBJECTIVE:   Nicky Hart is a 24 year old female who presents to clinic today for the following health issues: Left leg pain    Patient arrives here for left leg pain.  States is been going on for 2 weeks after going on the treadmill a number of times.  She states that she spent some time running on the treadmill for up to 20 minutes.  With walking the pain is a 5 out of 10 sitting 0 out of 10.  She has stopped using the treadmill for the last week but reports the pain has not improved.  [facility] in [facility] x-[facility] were done which were negative for any fracture but she states she knows it is a \"stress fracture\".        Patient Active Problem List    Diagnosis Date Noted     Vaginal bleeding 2019     Priority: Medium     Preeclampsia 2019     Priority: Medium      (spontaneous vaginal delivery) 2019     Priority: Medium     Allergic rhinitis due to pollen 2018     Priority: Medium     Overview:   Seasonal allergy symptoms.       Subacute endomyometritis 2017     Priority: Medium     Depression with anxiety 2013     Priority: Medium     Allergic state 2011     Priority: Medium     Dysmenorrhea 06/10/2011     Priority: Medium     Idiopathic scoliosis 06/10/2011     Priority: Medium     Past Medical History:   Diagnosis Date     Chronic cough 2002    resolved     Closed fracture of lower end of radius 2006    L arm     History of early menarche 2007     Pregnancy           Past Surgical History:   Procedure Laterality Date     DILATION AND CURETTAGE N/A 2017    Dr. Hazel; [facility] - retained placenta/endometritis     Allergies   Allergen Reactions     Bermuda Grass Itching     Dust Mite Extract Itching     Mold Itching     Pollen Extract Itching     Tree Nuts  [Nuts] Itching       Review of Systems     OBJECTIVE:     BP (P) 108/68   Pulse (P) 68   Temp (P) 99.1  F (37.3  C)   Resp (P) 18   Ht (P) 1.651 m (5' 5\")   Wt (P) " 71.6 kg (157 lb 12.8 oz)   LMP  (LMP Unknown)   BMI (P) 26.26 kg/m    Body mass index is 26.26 kg/m  (pended).  Physical Exam  Constitutional:       Appearance: Normal appearance.      Comments: Her gait is unremarkable   Musculoskeletal:      Comments: She does have pain on palpation to the tibia extending medial into the subcutaneous tissue.  She has good dorsal pedis pulses.  Good capillary refill on her toes.   Neurological:      Mental Status: She is alert.         Diagnostic Test Results:  none     ASSESSMENT/PLAN:         1. Pain of left lower leg  Exam is fairly consistent with shin splints.  Although the pain does go into the subcutaneous tissue.  She is could have some inflammation to her compartment.  Exam is not consistent with a compartment syndrome.  Discussed ibuprofen ice.  Does have an appointment later in the week that she plans on keeping.  Advised her I doubt that this is stress fractures.  We discussed stress fractures mainly in current long distance runners.  But she is fairly adamant that she has him.        Morris Contreras MD  [facility] AND [facility]

## 2020-01-17 ENCOUNTER — OFFICE VISIT (OUTPATIENT)
Dept: FAMILY MEDICINE | Facility: OTHER | Age: 25
End: 2020-01-17
Attending: FAMILY MEDICINE
Payer: COMMERCIAL

## 2020-01-17 VITALS
HEART RATE: 84 BPM | SYSTOLIC BLOOD PRESSURE: 104 MMHG | WEIGHT: 157.6 LBS | BODY MASS INDEX: 26.26 KG/M2 | HEIGHT: 65 IN | TEMPERATURE: 97.8 F | OXYGEN SATURATION: 99 % | DIASTOLIC BLOOD PRESSURE: 68 MMHG | RESPIRATION RATE: 16 BRPM

## 2020-01-17 DIAGNOSIS — M86.9 PERIOSTITIS OF LOWER LEG (H): Primary | ICD-10-CM

## 2020-01-17 PROCEDURE — 99213 OFFICE O/P EST LOW 20 MIN: CPT | Performed by: FAMILY MEDICINE

## 2020-01-17 ASSESSMENT — MIFFLIN-ST. JEOR: SCORE: 1475.75

## 2020-01-17 ASSESSMENT — PAIN SCALES - GENERAL: PAINLEVEL: SEVERE PAIN (6)

## 2020-01-17 NOTE — PROGRESS NOTES
"SUBJECTIVE:  Nicky Hart is a 24 year old female here for left lower leg pain.  She reports that at the beginning of January she was trying to get in better shape so she started running on a treadmill.  She states that fairly quickly after she started running on a treadmill she developed pain in her medial left lower shin.  She has been seen both in [facility] and our [facility] clinic.  An x-ray was performed and did not show any bony abnormalities.  She uses ibuprofen occasionally, generally once a day.  She has not had problems like this previously.  She is frustrated that she is not seeing any improvement.    ROS:    As above otherwise ROS is unremarkable.    OBJECTIVE:  /68   Pulse 84   Temp 97.8  F (36.6  C)   Resp 16   Ht 1.651 m (5' 5\")   Wt 71.5 kg (157 lb 9.6 oz)   LMP  (LMP Unknown)   SpO2 99%   BMI 26.23 kg/m      EXAM:  General Appearance: Pleasant, alert, appropriate appearance for age. No acute distress  Musculoskeletal: Left leg shows a large range of tenderness to palpation along the medial border of her tibia, approximately 10 inches in length.  She has no tenderness over the lateral border of her tibia.  No tenderness over her fibula.  Skin: No erythema or warmth.  No swelling is noted.  Neurologic Exam: Normal distal sensation to light touch.    ASSESSEMENT AND PLAN:    1. Periostitis of lower leg (H)      Previous x-ray was reviewed and shows no significant abnormality.  Discussed that she likely has inflammation of her periosteum.  Would recommend taking Advil 3 times daily with meals for the next 7 to 10 days and icing frequently.  If she is having no significant provement would recommend physical therapy.  Ultimately if that is not helpful we could consider MRI of the area.  Reassurance was given that her symptoms and exam are not consistent with a stress fracture given the large area of tenderness.    Derick Gustafson MD    This document was prepared using voice generated " software.  While every attempt was made for accuracy, grammatical errors may exist.

## 2020-01-17 NOTE — NURSING NOTE
Patient presents today for follow up on left lower leg pain.   Medication Reconciliation Complete    Ai Barth LPN  1/17/2020 3:25 PM

## 2020-01-20 ENCOUNTER — MYC MEDICAL ADVICE (OUTPATIENT)
Dept: OBGYN | Facility: OTHER | Age: 25
End: 2020-01-20

## 2020-02-11 ASSESSMENT — ENCOUNTER SYMPTOMS
SORE THROAT: 0
WOUND: 0
COUGH: 0
HALLUCINATIONS: 0
FATIGUE: 0
ARTHRALGIAS: 0
SHORTNESS OF BREATH: 0
ACTIVITY CHANGE: 0
RHINORRHEA: 0
APPETITE CHANGE: 0
CHILLS: 0
DIFFICULTY URINATING: 0
SINUS PRESSURE: 0
NECK PAIN: 0
PALPITATIONS: 1

## 2020-02-12 ENCOUNTER — OFFICE VISIT (OUTPATIENT)
Dept: FAMILY MEDICINE | Facility: OTHER | Age: 25
End: 2020-02-12
Attending: FAMILY MEDICINE
Payer: COMMERCIAL

## 2020-02-12 VITALS
TEMPERATURE: 96.5 F | DIASTOLIC BLOOD PRESSURE: 68 MMHG | RESPIRATION RATE: 16 BRPM | HEART RATE: 64 BPM | SYSTOLIC BLOOD PRESSURE: 106 MMHG | BODY MASS INDEX: 25.71 KG/M2 | WEIGHT: 154.5 LBS

## 2020-02-12 DIAGNOSIS — S86.899A ANTERIOR SHIN SPLINTS: ICD-10-CM

## 2020-02-12 DIAGNOSIS — B07.0 PLANTAR WARTS: ICD-10-CM

## 2020-02-12 DIAGNOSIS — F43.23 ADJUSTMENT DISORDER WITH MIXED ANXIETY AND DEPRESSED MOOD: Primary | ICD-10-CM

## 2020-02-12 PROCEDURE — 99214 OFFICE O/P EST MOD 30 MIN: CPT | Performed by: FAMILY MEDICINE

## 2020-02-12 RX ORDER — BUPROPION HYDROCHLORIDE 300 MG/1
300 TABLET ORAL EVERY MORNING
Qty: 30 TABLET | Refills: 5 | Status: SHIPPED | OUTPATIENT
Start: 2020-02-12 | End: 2021-03-17

## 2020-02-12 ASSESSMENT — PAIN SCALES - GENERAL: PAINLEVEL: NO PAIN (0)

## 2020-02-12 ASSESSMENT — ANXIETY QUESTIONNAIRES
GAD7 TOTAL SCORE: 14
6. BECOMING EASILY ANNOYED OR IRRITABLE: MORE THAN HALF THE DAYS
7. FEELING AFRAID AS IF SOMETHING AWFUL MIGHT HAPPEN: NEARLY EVERY DAY
3. WORRYING TOO MUCH ABOUT DIFFERENT THINGS: NEARLY EVERY DAY
IF YOU CHECKED OFF ANY PROBLEMS ON THIS QUESTIONNAIRE, HOW DIFFICULT HAVE THESE PROBLEMS MADE IT FOR YOU TO DO YOUR WORK, TAKE CARE OF THINGS AT HOME, OR GET ALONG WITH OTHER PEOPLE: VERY DIFFICULT
1. FEELING NERVOUS, ANXIOUS, OR ON EDGE: MORE THAN HALF THE DAYS
2. NOT BEING ABLE TO STOP OR CONTROL WORRYING: MORE THAN HALF THE DAYS
5. BEING SO RESTLESS THAT IT IS HARD TO SIT STILL: SEVERAL DAYS

## 2020-02-12 ASSESSMENT — PATIENT HEALTH QUESTIONNAIRE - PHQ9
5. POOR APPETITE OR OVEREATING: SEVERAL DAYS
SUM OF ALL RESPONSES TO PHQ QUESTIONS 1-9: 5

## 2020-02-12 NOTE — LETTER
February 12, 2020      Nicky Hart  [address]  [place] [address]        To Whom It May Concern,     I am writing to support Nicky Hart; she is suffering from periosteal swelling of her shins and resulting pain with exercise.  Please consider refund of her gym membership at this time, as she allows for healing.    Please contact me at the above information with any questions or concerns.    Sincerely,          Arpita Carrington, DO  Family Practice

## 2020-02-12 NOTE — NURSING NOTE
"Chief Complaint   Patient presents with     Recheck Medication       Initial /68   Pulse 64   Temp 96.5  F (35.8  C) (Tympanic)   Resp 16   Wt 70.1 kg (154 lb 8 oz)   Breastfeeding Yes   BMI 25.71 kg/m   Estimated body mass index is 25.71 kg/m  as calculated from the following:    Height as of 1/17/20: 1.651 m (5' 5\").    Weight as of this encounter: 70.1 kg (154 lb 8 oz).  Medication Reconciliation: complete    Stella Boyd LPN  "
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
## 2020-02-13 ASSESSMENT — ANXIETY QUESTIONNAIRES: GAD7 TOTAL SCORE: 14

## 2020-03-06 ENCOUNTER — MEDICAL CORRESPONDENCE (OUTPATIENT)
Dept: HEALTH INFORMATION MANAGEMENT | Facility: OTHER | Age: 25
End: 2020-03-06

## 2020-03-11 ENCOUNTER — HEALTH MAINTENANCE LETTER (OUTPATIENT)
Age: 25
End: 2020-03-11

## 2020-04-28 ENCOUNTER — MYC MEDICAL ADVICE (OUTPATIENT)
Dept: FAMILY MEDICINE | Facility: OTHER | Age: 25
End: 2020-04-28

## 2020-07-28 ENCOUNTER — TELEPHONE (OUTPATIENT)
Dept: FAMILY MEDICINE | Facility: OTHER | Age: 25
End: 2020-07-28

## 2020-07-28 DIAGNOSIS — Z30.45 ENCOUNTER FOR SURVEILLANCE OF TRANSDERMAL PATCH HORMONAL CONTRACEPTIVE DEVICE: ICD-10-CM

## 2020-07-28 RX ORDER — NORELGESTROMIN AND ETHINYL ESTRADIOL 35; 150 UG/MG; UG/MG
PATCH TRANSDERMAL
Qty: 9 PATCH | Refills: 4 | Status: SHIPPED | OUTPATIENT
Start: 2020-07-28 | End: 2021-03-17

## 2020-07-28 NOTE — TELEPHONE ENCOUNTER
Rx for birth control patch sent to pharmacy - as long as she is done breastfeeding.  Arpita Carrington, DO on 7/28/2020 at 1:08 PM

## 2020-08-17 ENCOUNTER — ALLIED HEALTH/NURSE VISIT (OUTPATIENT)
Dept: FAMILY MEDICINE | Facility: OTHER | Age: 25
End: 2020-08-17
Payer: COMMERCIAL

## 2020-08-17 DIAGNOSIS — Z11.1 SCREENING EXAMINATION FOR PULMONARY TUBERCULOSIS: Primary | ICD-10-CM

## 2020-08-17 PROCEDURE — 86580 TB INTRADERMAL TEST: CPT | Performed by: REGISTERED NURSE

## 2020-08-17 NOTE — PROGRESS NOTES

## 2020-08-19 ENCOUNTER — ALLIED HEALTH/NURSE VISIT (OUTPATIENT)
Dept: FAMILY MEDICINE | Facility: OTHER | Age: 25
End: 2020-08-19
Payer: COMMERCIAL

## 2020-08-19 DIAGNOSIS — Z11.1 SCREENING EXAMINATION FOR PULMONARY TUBERCULOSIS: Primary | ICD-10-CM

## 2020-08-19 LAB
PPDINDURATION: 0 MM (ref 0–5)
PPDREDNESS: 0 MM

## 2020-08-19 PROCEDURE — 99207 ZZC NO CHARGE NURSE ONLY: CPT

## 2020-08-19 NOTE — PROGRESS NOTES
Mantoux results: No induration.  No swelling.  No redness.    Appointment slip given to patient to schedule step 2 mantoux in 1-3 weeks.      Katalina David RN, BSN on 8/19/2020 at 3:52 PM

## 2020-08-26 ENCOUNTER — ALLIED HEALTH/NURSE VISIT (OUTPATIENT)
Dept: FAMILY MEDICINE | Facility: OTHER | Age: 25
End: 2020-08-26
Attending: FAMILY MEDICINE
Payer: COMMERCIAL

## 2020-08-26 DIAGNOSIS — Z11.1 SCREENING EXAMINATION FOR PULMONARY TUBERCULOSIS: Primary | ICD-10-CM

## 2020-08-26 PROCEDURE — 86580 TB INTRADERMAL TEST: CPT | Performed by: REGISTERED NURSE

## 2020-08-26 NOTE — PROGRESS NOTES
The patient is asked the following questions today and these are her answers:    -Have you had a mantoux administered in the past 30 days?    Yes  -Have you had a previous positive Mantoux.  No  -Have you received BCG in the past.  No  -Have you had a live vaccine  (MMR, Varicella, OPV, Yellow Fever) in the last 6 weeks.  No  -Have you had and active  viral or bacterial infection in the past 6 weeks.  No  -Have you received corticosteroids or immunosuppressive agents in the past 6 weeks.  No  -Have you been diagnosed with HIV?  No  -Do you have a malignancy?  No    Mantoux Questionnaire: was positive for at least one answer. This is patient's second step mantoux. No contraindication with today's.      Katalina David RN, BSN on 8/26/2020 at 4:00 PM

## 2020-08-28 ENCOUNTER — ALLIED HEALTH/NURSE VISIT (OUTPATIENT)
Dept: FAMILY MEDICINE | Facility: OTHER | Age: 25
End: 2020-08-28
Attending: FAMILY MEDICINE
Payer: COMMERCIAL

## 2020-08-28 DIAGNOSIS — Z11.1 SCREENING EXAMINATION FOR PULMONARY TUBERCULOSIS: Primary | ICD-10-CM

## 2020-08-28 LAB
PPDINDURATION: 0 MM (ref 0–5)
PPDREDNESS: 0 MM

## 2020-08-28 PROCEDURE — 99207 ZZC NO CHARGE NURSE ONLY: CPT

## 2020-08-28 NOTE — PROGRESS NOTES
Mantoux results: No induration.  No swelling.  No redness.    Written documentation completed and given to patient.      Katalina David RN, BSN on 8/28/2020 at 4:27 PM

## 2020-09-25 ENCOUNTER — OFFICE VISIT (OUTPATIENT)
Dept: FAMILY MEDICINE | Facility: OTHER | Age: 25
End: 2020-09-25
Attending: FAMILY MEDICINE
Payer: COMMERCIAL

## 2020-09-25 VITALS
BODY MASS INDEX: 25.63 KG/M2 | RESPIRATION RATE: 18 BRPM | DIASTOLIC BLOOD PRESSURE: 68 MMHG | TEMPERATURE: 97.3 F | OXYGEN SATURATION: 97 % | HEART RATE: 84 BPM | WEIGHT: 154 LBS | SYSTOLIC BLOOD PRESSURE: 108 MMHG

## 2020-09-25 DIAGNOSIS — L98.9 SKIN LESION: Primary | ICD-10-CM

## 2020-09-25 PROCEDURE — 17110 DESTRUCTION B9 LES UP TO 14: CPT | Performed by: FAMILY MEDICINE

## 2020-09-25 ASSESSMENT — PATIENT HEALTH QUESTIONNAIRE - PHQ9
SUM OF ALL RESPONSES TO PHQ QUESTIONS 1-9: 2
5. POOR APPETITE OR OVEREATING: SEVERAL DAYS

## 2020-09-25 ASSESSMENT — ENCOUNTER SYMPTOMS
SHORTNESS OF BREATH: 0
ACTIVITY CHANGE: 0
FEVER: 0
CHILLS: 0
COUGH: 0
FATIGUE: 0
APPETITE CHANGE: 0
CHEST TIGHTNESS: 0

## 2020-09-25 ASSESSMENT — ANXIETY QUESTIONNAIRES
3. WORRYING TOO MUCH ABOUT DIFFERENT THINGS: NEARLY EVERY DAY
2. NOT BEING ABLE TO STOP OR CONTROL WORRYING: NEARLY EVERY DAY
6. BECOMING EASILY ANNOYED OR IRRITABLE: NEARLY EVERY DAY
7. FEELING AFRAID AS IF SOMETHING AWFUL MIGHT HAPPEN: NEARLY EVERY DAY
1. FEELING NERVOUS, ANXIOUS, OR ON EDGE: NEARLY EVERY DAY
GAD7 TOTAL SCORE: 16
IF YOU CHECKED OFF ANY PROBLEMS ON THIS QUESTIONNAIRE, HOW DIFFICULT HAVE THESE PROBLEMS MADE IT FOR YOU TO DO YOUR WORK, TAKE CARE OF THINGS AT HOME, OR GET ALONG WITH OTHER PEOPLE: SOMEWHAT DIFFICULT
5. BEING SO RESTLESS THAT IT IS HARD TO SIT STILL: NOT AT ALL

## 2020-09-25 ASSESSMENT — PAIN SCALES - GENERAL: PAINLEVEL: NO PAIN (0)

## 2020-09-25 NOTE — PROGRESS NOTES
SUBJECTIVE:   Nicky Hart is a 25 year old female who presents to clinic today for the following health issues:    HPI  Nicky is here for bumps on her thighs that she has noticed being there for a few weeks to months.  Skin changes: some redness surrounding them when they start; then just skin colored (oldest one).  Painful/Tender: only when touching directly on them or moving them around  Hasn't tried anything to get them to go away.    Patient Active Problem List    Diagnosis Date Noted     Anterior shin splints 2020     Priority: Medium     Plantar warts 2020     Priority: Medium     Adjustment disorder with mixed anxiety and depressed mood 2020     Priority: Medium     Vaginal bleeding 2019     Priority: Medium     Preeclampsia 2019     Priority: Medium      (spontaneous vaginal delivery) 2019     Priority: Medium     Allergic rhinitis due to pollen 2018     Priority: Medium     Overview:   Seasonal allergy symptoms.       Subacute endomyometritis 2017     Priority: Medium     Depression with anxiety 2013     Priority: Medium     Allergic state 2011     Priority: Medium     Dysmenorrhea 06/10/2011     Priority: Medium     Idiopathic scoliosis 06/10/2011     Priority: Medium     Past Medical History:   Diagnosis Date     Chronic cough 2002    resolved     Closed fracture of lower end of radius 2006    L arm     History of early menarche 2007     Pregnancy           Past Surgical History:   Procedure Laterality Date     DILATION AND CURETTAGE N/A 2017    Dr. Hazel; [facility] - retained placenta/endometritis     Family History   Problem Relation Age of Onset     GERD Mother      GERD Maternal Grandmother      Ulcerative Colitis Maternal Grandfather      Social History     Tobacco Use     Smoking status: Never Smoker     Smokeless tobacco: Never Used   Substance Use Topics     Alcohol use: No     Alcohol/week: 0.0 standard drinks      Frequency: Never     Comment: Alcoholic Drinks/day: rare     Social History     Social History Narrative    Attends [employer]-senior fall 2014.     Doni Mathew-stepfather. Now  from her mother and out of the home.    Minimal contact with her biological father in [place].     Kiley Hayes Mother    Only child    Worked at [employer] in [facility]; now at [employer] [place].     Current Outpatient Medications   Medication Sig Dispense Refill     ALPRAZolam (XANAX) 0.25 MG tablet Take 1 tablet (0.25 mg) by mouth 3 times daily as needed for anxiety or sleep 10 tablet 1     buPROPion (WELLBUTRIN XL) 300 MG 24 hr tablet Take 1 tablet (300 mg) by mouth every morning 30 tablet 5     EPINEPHrine (EPIPEN/ADRENACLICK/OR ANY BX GENERIC EQUIV) 0.3 MG/0.3ML injection 2-pack Inject 0.3 mg into the muscle once as needed for allergic reaction for up to 1 dose.       norelgestromin-ethinyl estradiol (ORTHO EVRA) 150-35 MCG/24HR patch Remove old patch and apply new patch onto the skin once a week for 3 weeks (21 days). Do not wear patch week 4 (days 22-28), then repeat.  Okay to skip placebo week intermittently. 9 patch 4     Allergies   Allergen Reactions     Bermuda Grass Itching     Dust Mite Extract Itching     Mold Itching     Pollen Extract Itching     Tree Nuts  [Nuts] Itching     Review of Systems   Constitutional: Negative for activity change, appetite change, chills, fatigue and fever.   Respiratory: Negative for cough, chest tightness and shortness of breath.    All other systems reviewed and are negative.     OBJECTIVE:     /68   Pulse 84   Temp 97.3  F (36.3  C) (Tympanic)   Resp 18   Wt 69.9 kg (154 lb)   LMP 09/21/2020 (Exact Date)   SpO2 97%   Breastfeeding Yes   BMI 25.63 kg/m    Body mass index is 25.63 kg/m .  Physical Exam  Vitals signs and nursing note reviewed.   Constitutional:       Appearance: Normal appearance.   HENT:      Head: Normocephalic and atraumatic.   Skin:     Comments: 2-3mm  round irregular flesh colored raised area with broad base attachment to normal skin surface at inner L thigh.  ~3-4cm distal is another raised flesh colored lesion with erythematous ring at base.  Similar lesion with the erythematous base on the R inner thigh.     Neurological:      Mental Status: She is alert.     Diagnostic Test Results:  none     ASSESSMENT/PLAN:     1. Skin lesion  Skin tag vs common wart.  Doesn't have appearance of condyloma however due to location this is not impossible.  Developing and early stage of skin lesions; therefore offered to keep watching, but Nicky is requesting removal today.  Reviewed returning for excision vs cryotherapy today as they have benign appearance.  Made aware I will not have definitive diagnosis with cryotherapy.  After discussion, elects cryotherapy due to improved healing potential afterward and lower risk of infection development.  Areas treated with cryotherapy x 3 freeze-thaw cycles.  Tolerated well despite her anxiety about procedure.  She is aware to return to clinic if they do not resolve or any other problems develop.  - DESTRUCT BENIGN LESION, UP TO 14      Arpita Carrington DO  [facility]

## 2020-09-25 NOTE — NURSING NOTE
"Chief Complaint   Patient presents with     Derm Problem     bumps in inner thigh area       Initial /68   Pulse 84   Temp 97.3  F (36.3  C) (Tympanic)   Resp 18   Wt 69.9 kg (154 lb)   LMP 09/21/2020 (Exact Date)   SpO2 97%   Breastfeeding Yes   BMI 25.63 kg/m   Estimated body mass index is 25.63 kg/m  as calculated from the following:    Height as of 1/17/20: 1.651 m (5' 5\").    Weight as of this encounter: 69.9 kg (154 lb).  Medication Reconciliation: complete    Stella Boyd LPN  "

## 2020-09-25 NOTE — LETTER
September 25, 2020      Nicky Hart  [address]  [place] [facility] [place] [facility] [id number]        To Whom It May Concern,      I am writing to support Nicky Hart; she is suffering from periosteal swelling of her shins and resulting pain with exercise.  Please consider refund of her gym membership at this time for that reason and amidst covid pandemic, as she allows for healing.     Please contact me at the above information with any questions or concerns.     Sincerely,              Arpita Carrington, DO  Family Practice          Sincerely,        Arpita Carrington, DO

## 2020-09-26 ASSESSMENT — ANXIETY QUESTIONNAIRES: GAD7 TOTAL SCORE: 16

## 2020-10-11 ENCOUNTER — ALLIED HEALTH/NURSE VISIT (OUTPATIENT)
Dept: FAMILY MEDICINE | Facility: OTHER | Age: 25
End: 2020-10-11
Payer: COMMERCIAL

## 2020-10-11 DIAGNOSIS — R51.9 HEADACHE: Primary | ICD-10-CM

## 2020-10-11 DIAGNOSIS — J02.9 SORE THROAT: ICD-10-CM

## 2020-10-11 PROCEDURE — C9803 HOPD COVID-19 SPEC COLLECT: HCPCS

## 2020-10-11 PROCEDURE — 99207 PR NO CHARGE NURSE ONLY: CPT

## 2020-10-11 PROCEDURE — U0003 INFECTIOUS AGENT DETECTION BY NUCLEIC ACID (DNA OR RNA); SEVERE ACUTE RESPIRATORY SYNDROME CORONAVIRUS 2 (SARS-COV-2) (CORONAVIRUS DISEASE [COVID-19]), AMPLIFIED PROBE TECHNIQUE, MAKING USE OF HIGH THROUGHPUT TECHNOLOGIES AS DESCRIBED BY CMS-2020-01-R: HCPCS | Mod: ZL

## 2020-10-11 NOTE — NURSING NOTE
Chief Complaint   Patient presents with     Covid 19 Testing     headache, sore throat, cough       Patient swabbed for COVID-19 testing.  Jason Napoles LPN on 10/11/2020 at 4:42 PM

## 2020-10-13 LAB
SARS-COV-2 RNA SPEC QL NAA+PROBE: NOT DETECTED
SPECIMEN SOURCE: NORMAL

## 2020-11-12 ENCOUNTER — NURSE TRIAGE (OUTPATIENT)
Dept: FAMILY MEDICINE | Facility: OTHER | Age: 25
End: 2020-11-12

## 2020-11-12 NOTE — TELEPHONE ENCOUNTER
"Patient calling and states woke up about one hour ago and has blurred vision in right eye, has tried everything and cant get it to clear it is cloudy.    States about 2 weeks ago her tongue became numb for about 4 hours.   States has a little headache but always does.    Patient advised to present to ED and verbalized understanding    Esme Moffett RN on 11/12/2020 at 10:59 AM      Additional Information    Negative: Weakness of the face, arm or leg on one side of the body    Negative: Followed getting substance in the eye    Negative: Foreign body or object is or was lodged in the eye    Negative: Followed an eye injury    Negative: Followed sun lamp or sun exposure (UV keratitis)    Negative: Yellow or green discharge (pus) in the eye    Negative: Pregnant    Negative: Complete loss of vision in 1 or both eyes    Negative: Severe eye pain    Negative: Severe headache    Negative: Double vision    Blurred vision or visual changes and present now and sudden onset or new (e.g., minutes, hours, days) (Exception: seeing floaters / black specks OR previously diagnosed migraine headaches with same symptoms)    Answer Assessment - Initial Assessment Questions  1. DESCRIPTION: \"What is the vision loss like? Describe it for me.\" (e.g., complete vision loss, blurred vision, double vision, floaters, etc.)      Blurry vision right eye  2. LOCATION: \"One or both eyes?\" If one, ask: \"Which eye?\"      Right eye  3. SEVERITY: \"Can you see anything?\" If so, ask: \"What can you see?\" (e.g., fine print)      Can see its all cloudy  4. ONSET: \"When did this begin?\" \"Did it start suddenly or has this been gradual?\"      Today about one hour  5. PATTERN: \"Does this come and go, or has it been constant since it started?\"      constant  6. PAIN: \"Is there any pain in your eye(s)?\"  (Scale 1-10; or mild, moderate, severe)      denies  7. CONTACTS-GLASSES: \"Do you wear contacts or glasses?\"      denies  8. CAUSE: \"What do you think is " "causing this visual problem?\"      unsure  9. OTHER SYMPTOMS: \"Do you have any other symptoms?\" (e.g., confusion, headache, arm or leg weakness, speech problems)      Little headache but always has headache problems  10. PREGNANCY: \"Is there any chance you are pregnant?\" \"When was your last menstrual period?\"        Small chance    Protocols used: VISION LOSS OR CHANGE-A-OH      "

## 2020-12-29 ENCOUNTER — ALLIED HEALTH/NURSE VISIT (OUTPATIENT)
Dept: FAMILY MEDICINE | Facility: OTHER | Age: 25
End: 2020-12-29
Attending: FAMILY MEDICINE
Payer: COMMERCIAL

## 2020-12-29 DIAGNOSIS — R51.9 HEADACHE: Primary | ICD-10-CM

## 2020-12-29 LAB
SARS-COV-2 RNA SPEC QL NAA+PROBE: NORMAL
SPECIMEN SOURCE: NORMAL

## 2020-12-29 PROCEDURE — U0003 INFECTIOUS AGENT DETECTION BY NUCLEIC ACID (DNA OR RNA); SEVERE ACUTE RESPIRATORY SYNDROME CORONAVIRUS 2 (SARS-COV-2) (CORONAVIRUS DISEASE [COVID-19]), AMPLIFIED PROBE TECHNIQUE, MAKING USE OF HIGH THROUGHPUT TECHNOLOGIES AS DESCRIBED BY CMS-2020-01-R: HCPCS | Mod: ZL | Performed by: FAMILY MEDICINE

## 2020-12-29 PROCEDURE — C9803 HOPD COVID-19 SPEC COLLECT: HCPCS

## 2020-12-29 NOTE — PROGRESS NOTES
Chief Complaint   Patient presents with     Covid 19 Testing     Sore throat  Medication Reconciliation: complete    Loida Dickens LPN

## 2020-12-30 LAB
LABORATORY COMMENT REPORT: NORMAL
SARS-COV-2 RNA SPEC QL NAA+PROBE: NEGATIVE
SPECIMEN SOURCE: NORMAL

## 2021-02-03 ENCOUNTER — VIRTUAL VISIT (OUTPATIENT)
Dept: FAMILY MEDICINE | Facility: OTHER | Age: 26
End: 2021-02-03
Attending: PHYSICIAN ASSISTANT
Payer: COMMERCIAL

## 2021-02-03 DIAGNOSIS — Z20.822 COVID-19 RULED OUT: Primary | ICD-10-CM

## 2021-02-03 NOTE — NURSING NOTE
Patient is requesting a COVID test as she was exposed by a classmate that tested positive yesterday and last exposure was Friday. She is having sore throat and cough. Due to symptoms she can schedule curb side test. Transferred to schedule to make appointment.  Gogo Early LPN ....................  2/3/2021   8:26 AM

## 2021-02-03 NOTE — PROGRESS NOTES
Patient is requesting a COVId test as she was exposed by a classmate that tested positive yesterday. Patient has symptoms- cough and sore throat. Transferred to scheduling to have appointment made.  Gogo Early LPN ....................  2/3/2021   8:31 AM

## 2021-02-04 ENCOUNTER — ALLIED HEALTH/NURSE VISIT (OUTPATIENT)
Dept: FAMILY MEDICINE | Facility: OTHER | Age: 26
End: 2021-02-04
Attending: FAMILY MEDICINE
Payer: COMMERCIAL

## 2021-02-04 DIAGNOSIS — R05.9 COUGH: Primary | ICD-10-CM

## 2021-02-04 LAB
LABORATORY COMMENT REPORT: NORMAL
SARS-COV-2 RNA RESP QL NAA+PROBE: NEGATIVE
SARS-COV-2 RNA RESP QL NAA+PROBE: NORMAL
SPECIMEN SOURCE: NORMAL
SPECIMEN SOURCE: NORMAL

## 2021-02-04 PROCEDURE — C9803 HOPD COVID-19 SPEC COLLECT: HCPCS

## 2021-02-04 PROCEDURE — U0005 INFEC AGEN DETEC AMPLI PROBE: HCPCS | Mod: ZL | Performed by: FAMILY MEDICINE

## 2021-02-04 PROCEDURE — U0003 INFECTIOUS AGENT DETECTION BY NUCLEIC ACID (DNA OR RNA); SEVERE ACUTE RESPIRATORY SYNDROME CORONAVIRUS 2 (SARS-COV-2) (CORONAVIRUS DISEASE [COVID-19]), AMPLIFIED PROBE TECHNIQUE, MAKING USE OF HIGH THROUGHPUT TECHNOLOGIES AS DESCRIBED BY CMS-2020-01-R: HCPCS | Mod: ZL | Performed by: FAMILY MEDICINE

## 2021-03-17 ENCOUNTER — OFFICE VISIT (OUTPATIENT)
Dept: FAMILY MEDICINE | Facility: OTHER | Age: 26
End: 2021-03-17
Attending: FAMILY MEDICINE
Payer: COMMERCIAL

## 2021-03-17 VITALS
HEART RATE: 80 BPM | OXYGEN SATURATION: 98 % | HEIGHT: 66 IN | WEIGHT: 153.25 LBS | DIASTOLIC BLOOD PRESSURE: 70 MMHG | SYSTOLIC BLOOD PRESSURE: 106 MMHG | RESPIRATION RATE: 16 BRPM | BODY MASS INDEX: 24.63 KG/M2 | TEMPERATURE: 97.4 F

## 2021-03-17 DIAGNOSIS — Z11.8 ENCOUNTER FOR SCREENING EXAMINATION FOR CHLAMYDIAL INFECTION: ICD-10-CM

## 2021-03-17 DIAGNOSIS — N81.11 MIDLINE CYSTOCELE: ICD-10-CM

## 2021-03-17 DIAGNOSIS — G44.219 EPISODIC TENSION-TYPE HEADACHE, NOT INTRACTABLE: ICD-10-CM

## 2021-03-17 DIAGNOSIS — Z12.4 CERVICAL CANCER SCREENING: ICD-10-CM

## 2021-03-17 DIAGNOSIS — B07.0 PLANTAR WARTS: ICD-10-CM

## 2021-03-17 DIAGNOSIS — Z00.00 ROUTINE HISTORY AND PHYSICAL EXAMINATION OF ADULT: Primary | ICD-10-CM

## 2021-03-17 DIAGNOSIS — N81.89 PELVIC FLOOR RELAXATION: ICD-10-CM

## 2021-03-17 DIAGNOSIS — Z30.011 ENCOUNTER FOR INITIAL PRESCRIPTION OF CONTRACEPTIVE PILLS: ICD-10-CM

## 2021-03-17 PROBLEM — N71.0: Status: RESOLVED | Noted: 2017-05-16 | Resolved: 2021-03-17

## 2021-03-17 PROBLEM — N93.9 VAGINAL BLEEDING: Status: RESOLVED | Noted: 2019-09-06 | Resolved: 2021-03-17

## 2021-03-17 PROBLEM — O14.90 PREECLAMPSIA: Status: RESOLVED | Noted: 2019-09-06 | Resolved: 2021-03-17

## 2021-03-17 PROCEDURE — 88142 CYTOPATH C/V THIN LAYER: CPT | Performed by: FAMILY MEDICINE

## 2021-03-17 PROCEDURE — G0123 SCREEN CERV/VAG THIN LAYER: HCPCS | Performed by: FAMILY MEDICINE

## 2021-03-17 PROCEDURE — 99395 PREV VISIT EST AGE 18-39: CPT | Performed by: FAMILY MEDICINE

## 2021-03-17 PROCEDURE — 99213 OFFICE O/P EST LOW 20 MIN: CPT | Mod: 25 | Performed by: FAMILY MEDICINE

## 2021-03-17 RX ORDER — DEXTROAMPHETAMINE SULFATE, DEXTROAMPHETAMINE SACCHARATE, AMPHETAMINE SULFATE AND AMPHETAMINE ASPARTATE 5; 5; 5; 5 MG/1; MG/1; MG/1; MG/1
1 CAPSULE, EXTENDED RELEASE ORAL EVERY MORNING
COMMUNITY
Start: 2021-03-01 | End: 2022-09-12

## 2021-03-17 RX ORDER — DEXTROAMPHETAMINE SACCHARATE, AMPHETAMINE ASPARTATE, DEXTROAMPHETAMINE SULFATE AND AMPHETAMINE SULFATE 3.75; 3.75; 3.75; 3.75 MG/1; MG/1; MG/1; MG/1
1 TABLET ORAL EVERY EVENING
COMMUNITY
Start: 2021-03-03 | End: 2021-08-09

## 2021-03-17 RX ORDER — DROSPIRENONE AND ETHINYL ESTRADIOL 0.02-3(28)
1 KIT ORAL DAILY
Qty: 84 TABLET | Refills: 1 | Status: SHIPPED | OUTPATIENT
Start: 2021-03-17 | End: 2021-10-06

## 2021-03-17 SDOH — HEALTH STABILITY: MENTAL HEALTH: HOW MANY STANDARD DRINKS CONTAINING ALCOHOL DO YOU HAVE ON A TYPICAL DAY?: NOT ASKED

## 2021-03-17 SDOH — HEALTH STABILITY: MENTAL HEALTH: HOW OFTEN DO YOU HAVE 6 OR MORE DRINKS ON ONE OCCASION?: NOT ASKED

## 2021-03-17 SDOH — HEALTH STABILITY: MENTAL HEALTH: HOW OFTEN DO YOU HAVE A DRINK CONTAINING ALCOHOL?: NOT ASKED

## 2021-03-17 ASSESSMENT — PATIENT HEALTH QUESTIONNAIRE - PHQ9
5. POOR APPETITE OR OVEREATING: SEVERAL DAYS
SUM OF ALL RESPONSES TO PHQ QUESTIONS 1-9: 1

## 2021-03-17 ASSESSMENT — ANXIETY QUESTIONNAIRES
2. NOT BEING ABLE TO STOP OR CONTROL WORRYING: NOT AT ALL
7. FEELING AFRAID AS IF SOMETHING AWFUL MIGHT HAPPEN: NOT AT ALL
6. BECOMING EASILY ANNOYED OR IRRITABLE: SEVERAL DAYS
GAD7 TOTAL SCORE: 3
IF YOU CHECKED OFF ANY PROBLEMS ON THIS QUESTIONNAIRE, HOW DIFFICULT HAVE THESE PROBLEMS MADE IT FOR YOU TO DO YOUR WORK, TAKE CARE OF THINGS AT HOME, OR GET ALONG WITH OTHER PEOPLE: NOT DIFFICULT AT ALL
1. FEELING NERVOUS, ANXIOUS, OR ON EDGE: SEVERAL DAYS
3. WORRYING TOO MUCH ABOUT DIFFERENT THINGS: NOT AT ALL
5. BEING SO RESTLESS THAT IT IS HARD TO SIT STILL: NOT AT ALL

## 2021-03-17 ASSESSMENT — MIFFLIN-ST. JEOR: SCORE: 1458.95

## 2021-03-17 ASSESSMENT — PAIN SCALES - GENERAL: PAINLEVEL: MILD PAIN (3)

## 2021-03-17 NOTE — NURSING NOTE
"Chief Complaint   Patient presents with     Physical       Initial /70   Pulse 80   Temp 97.4  F (36.3  C) (Tympanic)   Resp 16   Ht 1.664 m (5' 5.5\")   Wt 69.5 kg (153 lb 4 oz)   LMP 02/19/2021 (Exact Date)   SpO2 98%   BMI 25.11 kg/m   Estimated body mass index is 25.11 kg/m  as calculated from the following:    Height as of this encounter: 1.664 m (5' 5.5\").    Weight as of this encounter: 69.5 kg (153 lb 4 oz).  Medication Reconciliation: complete    Stella Boyd LPN  "

## 2021-03-17 NOTE — PATIENT INSTRUCTIONS
Patient Education     Prevention Guidelines, Women Ages 18 to 39  Screening tests and vaccines are an important part of managing your health. A screening test is done to find possible disorders or diseases in people who don't have any symptoms. The goal is to find a disease early so lifestyle changes can be made and you can be watched more closely to reduce the risk of disease, or to detect it early enough to treat it most effectively. Screening tests are not considered diagnostic, but are used to determine if more testing is needed. Health counseling is essential, too. Below are guidelines for these, for women ages 18 to 39. Talk with your healthcare provider to make sure you re up-to-date on what you need.   Screening Who needs it How often   Alcohol misuse All women in this age group  At routine exams   Blood pressure All women in this age group  Yearly checkup if your blood pressure is normal   Normal blood pressure is less than 120/80 mm Hg   If your blood pressure reading is higher than normal, follow the advice of your healthcare provider    Breast cancer All women in this age group should talk with their healthcare providers about the need for clinical breast exams (CBE)1  Clinical breast exam every 3 years1    Cervical cancer Women ages 21 and older  Women between ages 21 and 29 should have a Pap test every 3 years; women between ages 30 and 65 are advised to have a Pap test plus an HPV test every 5 years    Chlamydia Sexually active women ages 24 and younger, and women at increased risk for infection  Every 3 years if you're at risk or have symptoms    Depression All women in this age group  At routine exams   Diabetes mellitus, type 2  Adults with no symptoms who are overweight or obese and have 1 or more other risk factors for diabetes  At least every 3 years. Also, testing for diabetes during pregnancy after the 24th week.     Gonorrhea Sexually active women at increased risk for infection  At routine  exams   Hepatitis C Anyone at increased risk  At routine exams   HIV All women At routine exams3     Obesity All women in this age group  At routine exams   Syphilis Women at increased risk for infection should talk with their healthcare provider  At routine exams   Tuberculosis Women at increased risk for infection should talk with their healthcare provider  Ask your healthcare provider    Vision All women in this age group  At least 1 complete exam in your 20s, and 2 in your 30s    Vaccine Who needs it How often   Chickenpox (varicella)  All women in this age group who have no record of this infection or vaccine  2 doses; the second dose should be given 4 to 8 weeks after the first dose    Hepatitis A Women at increased risk for infection should talk with their healthcare provider  2 doses given at least 6 months apart    Hepatitis B Women at increased risk for infection should talk with their healthcare provider  3 doses over 6 months; second dose should be given 1 month after the first dose; the third dose should be given at least 2 months after the second dose and at least 4 months after the first dose    Haemophilus influenzae  Type B (HIB)  Women at increased risk for infection should talk with their healthcare provider  1 to 3 doses   Human papillomavirus (HPV)  All women in this age group up to age 26  3 doses; the second dose should be given 1 to 2 months after the first dose and the third dose given 6 months after the first dose    Influenza (flu) All women in this age group  Once a year   Measles, mumps, rubella (MMR)  All women in this age group who have no record of these infections or vaccines  1 or 2 doses   Meningococcal Women at increased risk for infection should talk with their healthcare provider  1 or more doses   Pneumococcal conjugate vaccine (PCV13) and pneumococcal polysaccharide vaccine (PPSV23)  Women at increased risk for infection should talk with their healthcare provider  PCV13: 1  dose ages 19 to 65 (protects against 13 types of pneumococcal bacteria)   PPSV23: 1 to 2 doses through age 64, or 1 dose at 65 or older (protects against 23 types of pneumococcal bacteria)    Tetanus/diphtheria/pertussis (Td/Tdap) booster All women in this age group  Td every 10 years, or a one-time dose of Tdap instead of a Td booster after age 18, then Td every 10 years    Counseling Who needs it How often   BRCA gene mutation testing for breast and ovarian cancer susceptibility  Women with increased risk for having gene mutation  When your risk is known    Breast cancer and chemoprevention  Women at high risk for breast cancer  When your risk is known    Diet and exercise Women who are overweight or obese  When diagnosed, and then at routine exams    Domestic violence Women at the age in which they are able to have children  At routine exams   Sexually transmitted infection prevention  Women who are sexually active  At routine exams   Skin cancer Prevention of skin cancer in fair-skinned adults  At routine exams   Use of tobacco and the health effects it can cause  All women in this age group  Every visit   1 According to the ACS, women ages 20 to 39 years should have a clinical breast exam (CBE) as part of their routine health exam every 3 years. Breast self-exams are an option for women starting in their 20s. But the U.S. Preventive Services Task Force (USPSTF) does not recommend CBE.   2 Those who are 18 years old and not up-to-date on their childhood vaccines should get all appropriate catch-up vaccines recommended by the CDC.   3 The USPSTF recommends that all people ages 15 to 65 years be screened for HIV and those younger or older people at increased risk. The CDC recommends that everyone between the ages of 13 and 64 get tested for HIV at least once as part of routine health care.   [employer] last reviewed this educational content on 10/1/2017    3697-3552 [employer]. All rights reserved.  This information is not intended as a substitute for professional medical care. Always follow your healthcare professional's instructions.         Patient Education     Rebound Headache     Overuse of pain medications can lead to rebound headaches.   You use pain medicines called analgesics to treat your headaches. You are now having more frequent or intense headaches (rebound headaches). This is your body s response to too much pain medicine. Prescription pain medicines can cause these headaches. But so can over-the-counter medicines like acetaminophen or ibuprofen. A drug that contains caffeine or butalbital is most likely to cause rebound headache.  Symptoms of rebound headache include:    Mild to moderate headache for 15 or more days each month in a person with pre-existing headache disorder    Headache when you wake up that continues most of the day    Headaches getting worse over time    Need for more and more medicine to treat headaches  Your healthcare provider can usually diagnose rebound headaches by your symptoms and medicine history. You may need tests to rule out other causes of your headaches. In the emergency room, you may be given a non-analgesic pain medicine to treat the pain or stop future headaches.  Home care  Treatment involves stopping use of your pain medicines. Your healthcare provider can tell you how to safely do this. You may be able to stop right away. Or you may need to take less and less over time (taper off). This will depend on the medicines you have been taking. To do this, follow the schedule that your provider gives you. If you are taking pain medicines for other types of pain at the same time, your healthcare provider may need other specialists to participate in your care.    For the first week or so after stopping, the headaches will likely get worse. You may also have withdrawal symptoms. These often include nausea, vomiting, and trouble sleeping. You may be given a medicine to  help relieve pain and withdrawal symptoms. Take this exactly as you have been told. It is vital to avoid taking daily pain medicine. If you do so, rebound headaches will continue.    Caffeine can make rebound headaches worse. If you have caffeinated drinks every day, slowly cut your intake.    Keep a written log of your headaches. This can help you and your healthcare provider track your progress.    Be patient. It can take about 2 to 6 months to stop having rebound headaches.    Once you have broken the headache cycle, be careful not to start it again. Work with your provider to make a treatment plan for headache pain that has low risk of causing rebound headaches.    Relaxation can help lower tension and relieve pain. Try a massage, meditation, yoga, or other relaxation techniques. Or make time for a relaxing hobby that you enjoy.  Follow-up care  Follow up with your healthcare provider, or as advised.  When to seek medical advice  Call your healthcare provider right away if any of these occur:    Fever of 100.4 F (38 C) or higher    Headaches that wake you from sleep    Repeated vomiting or visual problems that don't go away    Headache with a stiff neck, rash, confusion, weakness, numbness, seizure (convulsion), or trouble talking    Headache that starts after a head injury or fall    A type of headache you have never had before    Headache that gets worse despite rest and medicine  [employer] last reviewed this educational content on 4/1/2018 2000-2020 [employer]. All rights reserved. This information is not intended as a substitute for professional medical care. Always follow your healthcare professional's instructions.

## 2021-03-17 NOTE — PROGRESS NOTES
ANNUAL PHYSICAL - FEMALE    HPI: Nicky presents for a yearly exam.  Concerns include:  1. Has had medication changes since our last visit.  Has been diagnosed with ADHD by Mary Marks @ [facility].  Currently on Adderall.  Does complain of daily headaches worsening for the last few months.  Everyday; achy/throbbing.  All over.  Persistent, can last all day.  Taking Excedrin most days.  Mostly parietal/occipital, but can be generalized or involve behind the eyes.  No significant nausea/vomiting.    2. Warts.  On bottom of foot.  Spreading slightly.  Ones on heel are more bothersome/tender with walking or being on her feet longer.  Has tried OTC/home remedies without relief.     3. IBS symptoms. Random/intermittent symptoms.  Pressure/gas pain.  Then can have more urgent desire to defecate.  Symptoms can resolve with defecation.  Does not wake her from sleep.  No blood in stool.  Uncertain for food triggers.    4. Periods.  Longer than prior to having kids.  Hormones cause her difficulties with mood/weight gain.  She would like to avoid if possible.  Denies wanting to get TL/salpingectomy due to consideration of another child.  She would not oppose her  getting a vasectomy (as he doesn't want more children).  She has tried OrthoEvra, IUD, DepoProvera (worried about weight gain).  Considering OCPs.    5. Has been told she has a cystocele by OB/GYN.  Would like me to evaluate today.      Patient's last menstrual period was 02/19/2021 (exact date).   Contraception: OrthoEvra - caused cramping.  ?conception.  Risk for STI?: No  Last pap: 8/2/2018, normal.  DUE soon.  Any hx of abnormal paps:  No  FH of early CA?: No  Cholesterol/DM concerns/screening: No  Tobacco?: No  Calcium intake: No  DEXA: NA  Last mammo: @ 42  Colonoscopy: @ 45-50  Immunizations: Tdap 6/25/2019; flu yearly; Shingrix @ 52; PNA @ 65/68.  Completed Hep B, HPV, MMR, Varicella.  Has had Covid-19 #1 (Moderna on 2/25/2021).    Patient Active  Problem List   Diagnosis     Allergic rhinitis due to pollen     Depression with anxiety     Dysmenorrhea     Idiopathic scoliosis     Anterior shin splints     Plantar warts     Adjustment disorder with mixed anxiety and depressed mood       Past Medical History:   Diagnosis Date     Chronic cough 2002    resolved     Closed fracture of lower end of radius 2006    L arm     History of early menarche 2007     Preeclampsia 2019     Pregnancy          Subacute endomyometritis 2017       Past Surgical History:   Procedure Laterality Date     DILATION AND CURETTAGE N/A 2017    Dr. Hazel; [facility] - retained placenta/endometritis       Social History     Socioeconomic History     Marital status:      Spouse name: Not on file     Number of children: Not on file     Years of education: Not on file     Highest education level: Not on file   Occupational History     Not on file   Social Needs     Financial resource strain: Not on file     Food insecurity     Worry: Not on file     Inability: Not on file     Transportation needs     Medical: Not on file     Non-medical: Not on file   Tobacco Use     Smoking status: Never Smoker     Smokeless tobacco: Never Used   Substance and Sexual Activity     Alcohol use: No     Alcohol/week: 0.0 standard drinks     Frequency: Never     Comment: Alcoholic Drinks/day: rare     Drug use: No     Comment: Drug use: No     Sexual activity: Yes     Partners: Male     Birth control/protection: None   Lifestyle     Physical activity     Days per week: Not on file     Minutes per session: Not on file     Stress: Not on file   Relationships     Social connections     Talks on phone: Not on file     Gets together: Not on file     Attends Gnosticist service: Not on file     Active member of club or organization: Not on file     Attends meetings of clubs or organizations: Not on file     Relationship status: Not on file     Intimate partner violence     Fear of current  "or ex partner: Not on file     Emotionally abused: Not on file     Physically abused: Not on file     Forced sexual activity: Not on file   Other Topics Concern     Parent/sibling w/ CABG, MI or angioplasty before 65F 55M? Not Asked   Social History Narrative    Attends [employer]-senior fall 2014.     Doni Mathew-stepfather. Now  from her mother and out of the home.    Minimal contact with her biological father in [place].     Kiley Hayes Mother    Only child    Worked at [employer] in [employer]; now at [employer].       Family History   Problem Relation Age of Onset     GERD Mother      GERD Maternal Grandmother      Ulcerative Colitis Maternal Grandfather        Current Outpatient Medications   Medication Sig Dispense Refill     ADDERALL XR 15 MG 24 hr capsule Take 1 capsule by mouth every morning       amphetamine-dextroamphetamine (ADDERALL) 15 MG tablet Take 1 tablet by mouth every evening       EPINEPHrine (EPIPEN/ADRENACLICK/OR ANY BX GENERIC EQUIV) 0.3 MG/0.3ML injection 2-pack Inject 0.3 mg into the muscle once as needed for allergic reaction for up to 1 dose.          REVIEW OF SYSTEMS:  Refer to HPI; all other systems reviewed and negative.    PHYSICAL EXAM:  /70   Pulse 80   Temp 97.4  F (36.3  C) (Tympanic)   Resp 16   Ht 1.664 m (5' 5.5\")   Wt 69.5 kg (153 lb 4 oz)   LMP 02/19/2021 (Exact Date)   SpO2 98%   BMI 25.11 kg/m    CONSTITUTIONAL:  Alert, cooperative, NAD.  EYES: No scleral icterus.  PERRLA.  Conjunctiva clear.  ENT/MOUTH: External ears and nose normal.  TMs normal.  Moist mucous membranes. Oropharynx clear.    ENDO: No thyromegaly or thyroid nodules.  LYMPH:  No cervical or supraclavicular LA.    BREASTS: No skin abnormalities, no erythema.  No discrete masses.  No nipple discharge, no axillary, supra- or infraclavicular LA.   CARDIOVASCULAR: Regular, S1, S2.  No S3 or S4.  No murmur/gallop/rub.  No peripheral edema.  RESPIRATORY: CTA bilaterally, no wheezes, rhonchi " or rales.  GI: Bowel sounds wnl.  Soft, nontender, non-distended.  No masses or HSM.  No rebound or guarding.  : Vulva: normal, no lesions or discharge  Urethral meatus: normal size and location, no lesions or discharge  Urethra: no tenderness or masses  Bladder: no fullness or tenderness  Vagina: normal appearance, no abnormal discharge, no lesions.  No obvious rectocele; mild cystocele.  Cervix: normal appearance, no lesions, no abnormal discharge.  Uterus: normal size and position, mobile, non-tender  Adnexa: nopalpable masses bilaterally. No cervical motion tenderness.  Pap smear obtained: No  MSKEL: Grossly normal ROM.  No clubbing.  INTEGUMENTARY:Warm, dry.  No rash noted on exposed skin.  + indurated/callous skin on bottom of R foot.  Consistent with plantar wart.  NEUROLOGIC: Facies symmetric.  Grossly normal movement and tone.  No tremor.  PSYCHIATRIC: Affect normal.  Speech fluent.      PHQ 2/12/2020 9/25/2020 3/17/2021   PHQ-9 Total Score 5 2 1   Q9: Thoughts of better off dead/self-harm past 2 weeks Not at all Not at all Not at all     GAYLE-7 SCORE 2/12/2020 9/25/2020 3/17/2021   Total Score 14 16 3     No results found for any visits on 03/17/21.    ASSESSMENT/PLAN:  1. Routine history and physical examination of adult  - Pap Screen Thin Prep    2. Cervical cancer screening  - Pap Screen Thin Prep    3. Encounter for screening examination for chlamydial infection  Denies need; will list low risk on Health Maintenance today.  Monogamous relationship.    4. Episodic tension-type headache, not intractable  Concern for overuse of Excedrin contributing to Chronic Daily Headache syndrome.  Stop use for minimum 2 weeks up to 4 weeks.  Will replace with tizanadine, supportive care (heat pad to neck/head), hydrate, eat healthy, exercise regularly.  Ok for tylenol up to 2x/week for breakthrough symptoms.  If not helpful after one month; would consider daily preventative: Topamax vs Nortriptyline.  - tiZANidine  (ZANAFLEX) 4 MG tablet; Take 1 tablet (4 mg) by mouth 3 times daily as needed for muscle spasms  Dispense: 45 tablet; Refill: 0    5. Plantar warts  Chronic; 5+ years with deep invasion.  Referred to Dermatology for more definitive and hopefully more successful removal.  - DERMATOLOGY ADULT REFERRAL; Future    6. Encounter for initial prescription of contraceptive pills  Will consider trial of OCPs to help control menses and avoid pregnancy.  Instructed to start after menses if she desires to.  Will continue to discuss possible vasectomy with her .  - drospirenone-ethinyl estradiol (KYM) 3-0.02 MG tablet; Take 1 tablet by mouth daily  Dispense: 84 tablet; Refill: 1    7. Pelvic floor relaxation  Mild cystocele seen on exam; more visually concerning to patient, but did have some incontinence issues after delivery of her daughter.  She will be referred to PT for pelvic floor therapy/strengthening.  - PHYSICAL THERAPY REFERRAL; Future    8. Midline cystocele  See #7.  - PHYSICAL THERAPY REFERRAL; Future    9. IBS  Stress reduction.  Discussed selective serotonin reuptake inhibitor which she has had side effects to in the past.  Low FODMAP diet, increased regular exercise to help with gut motility.  Suspect things will improve slightly as school is completed this spring.  Can consider referral/colonoscopy as next step.    Follow up with phone visit in 1 month to determine need for headache treatment - can review diet/IBS symptoms at that time as well.    Relevant cancer screening discussed.    Counseled on healthy diet, Calcium and vitamin D intake, and exercise.    Arpita Carrington, DO  Family Practice

## 2021-03-18 ASSESSMENT — ANXIETY QUESTIONNAIRES: GAD7 TOTAL SCORE: 3

## 2021-03-22 LAB
COPATH REPORT: NORMAL
PAP: NORMAL

## 2021-03-29 DIAGNOSIS — G44.219 EPISODIC TENSION-TYPE HEADACHE, NOT INTRACTABLE: Primary | ICD-10-CM

## 2021-03-29 NOTE — TELEPHONE ENCOUNTER
[facility] in #[id number] in [facility] [place] sent Rx request for the following:      Requested Prescriptions   Pending Prescriptions Disp Refills   tiZANidine (ZANAFLEX) 4 MG tablet 45 tablet 0    Sig: Take 1 tablet (4 mg) by mouth 3 times daily as needed for muscle spasms   Last Prescription Date:   3/17/21  Last Fill Qty/Refills:         45, R-0    Last Office Visit:              3/17/21  Future Office visit:           4/20/21  Routing refill request to provider for review/approval because:  Drug not on the G, P or [facility] refill protocol or controlled substance    Unable to complete prescription refill per RN Medication Refill Policy. Chyna Ojeda RN .............. 3/29/2021  2:59 PM

## 2021-03-31 DIAGNOSIS — G44.219 EPISODIC TENSION-TYPE HEADACHE, NOT INTRACTABLE: ICD-10-CM

## 2021-03-31 NOTE — TELEPHONE ENCOUNTER
tiZANidine (ZANAFLEX) 4 MG tablet 45 tablet 0 3/30/2021  No   Sig - Route: Take 1 tablet (4 mg) by mouth 3 times daily as needed for muscle spasms      To [facility]  Esme FANNY Moffett RN on 3/31/2021 at 12:04 PM

## 2021-04-12 ENCOUNTER — HOSPITAL ENCOUNTER (EMERGENCY)
Dept: CARDIOLOGY | Facility: OTHER | Age: 26
End: 2021-04-12
Attending: PHYSICIAN ASSISTANT
Payer: COMMERCIAL

## 2021-04-12 ENCOUNTER — HOSPITAL ENCOUNTER (EMERGENCY)
Facility: OTHER | Age: 26
Discharge: HOME | End: 2021-04-12
Attending: PHYSICIAN ASSISTANT
Payer: COMMERCIAL

## 2021-04-12 ENCOUNTER — APPOINTMENT (OUTPATIENT)
Dept: GENERAL RADIOLOGY | Facility: OTHER | Age: 26
End: 2021-04-12
Attending: PHYSICIAN ASSISTANT
Payer: COMMERCIAL

## 2021-04-12 VITALS
RESPIRATION RATE: 17 BRPM | HEIGHT: 65 IN | SYSTOLIC BLOOD PRESSURE: 106 MMHG | TEMPERATURE: 97.7 F | WEIGHT: 153 LBS | OXYGEN SATURATION: 98 % | BODY MASS INDEX: 25.49 KG/M2 | HEART RATE: 88 BPM | DIASTOLIC BLOOD PRESSURE: 75 MMHG

## 2021-04-12 DIAGNOSIS — I49.1 PAC (PREMATURE ATRIAL CONTRACTION): ICD-10-CM

## 2021-04-12 LAB
ALBUMIN UR-MCNC: NEGATIVE MG/DL
AMPHETAMINES UR QL SCN: ABNORMAL
ANION GAP SERPL CALCULATED.3IONS-SCNC: 8 MMOL/L (ref 3–14)
APPEARANCE UR: CLEAR
BARBITURATES UR QL: NOT DETECTED
BASOPHILS # BLD AUTO: 0.1 10E9/L (ref 0–0.2)
BASOPHILS NFR BLD AUTO: 0.8 %
BENZODIAZ UR QL: NOT DETECTED
BILIRUB UR QL STRIP: NEGATIVE
BUN SERPL-MCNC: 16 MG/DL (ref 7–25)
BUPRENORPHINE UR QL: NOT DETECTED NG/ML
CALCIUM SERPL-MCNC: 9.7 MG/DL (ref 8.6–10.3)
CANNABINOIDS UR QL: NOT DETECTED NG/ML
CHLORIDE SERPL-SCNC: 104 MMOL/L (ref 98–107)
CO2 SERPL-SCNC: 24 MMOL/L (ref 21–31)
COCAINE UR QL: NOT DETECTED
COLOR UR AUTO: YELLOW
CREAT SERPL-MCNC: 1.02 MG/DL (ref 0.6–1.2)
D DIMER PPP FEU-MCNC: 0.5 UG/ML FEU (ref 0–0.5)
D-METHAMPHET UR QL: NOT DETECTED NG/ML
DIFFERENTIAL METHOD BLD: ABNORMAL
EOSINOPHIL # BLD AUTO: 0.3 10E9/L (ref 0–0.7)
EOSINOPHIL NFR BLD AUTO: 2.5 %
ERYTHROCYTE [DISTWIDTH] IN BLOOD BY AUTOMATED COUNT: 12.3 % (ref 10–15)
GFR SERPL CREATININE-BSD FRML MDRD: 67 ML/MIN/{1.73_M2}
GLUCOSE SERPL-MCNC: 85 MG/DL (ref 70–105)
GLUCOSE UR STRIP-MCNC: NEGATIVE MG/DL
HCG UR QL: NEGATIVE
HCT VFR BLD AUTO: 43.4 % (ref 35–47)
HGB BLD-MCNC: 14.6 G/DL (ref 11.7–15.7)
HGB UR QL STRIP: ABNORMAL
IMM GRANULOCYTES # BLD: 0 10E9/L (ref 0–0.4)
IMM GRANULOCYTES NFR BLD: 0.3 %
KETONES UR STRIP-MCNC: NEGATIVE MG/DL
LEUKOCYTE ESTERASE UR QL STRIP: NEGATIVE
LYMPHOCYTES # BLD AUTO: 3 10E9/L (ref 0.8–5.3)
LYMPHOCYTES NFR BLD AUTO: 25.5 %
MAGNESIUM SERPL-MCNC: 2.3 MG/DL (ref 1.9–2.7)
MCH RBC QN AUTO: 29.7 PG (ref 26.5–33)
MCHC RBC AUTO-ENTMCNC: 33.6 G/DL (ref 31.5–36.5)
MCV RBC AUTO: 88 FL (ref 78–100)
METHADONE UR QL SCN: NOT DETECTED
MONOCYTES # BLD AUTO: 0.7 10E9/L (ref 0–1.3)
MONOCYTES NFR BLD AUTO: 6 %
NEUTROPHILS # BLD AUTO: 7.7 10E9/L (ref 1.6–8.3)
NEUTROPHILS NFR BLD AUTO: 64.9 %
NITRATE UR QL: NEGATIVE
OPIATES UR QL SCN: NOT DETECTED
OXYCODONE UR QL: NOT DETECTED NG/ML
PCP UR QL SCN: NOT DETECTED
PH UR STRIP: 7.5 PH (ref 5–9)
PLATELET # BLD AUTO: 362 10E9/L (ref 150–450)
POTASSIUM SERPL-SCNC: 3.8 MMOL/L (ref 3.5–5.1)
PROPOXYPH UR QL: NOT DETECTED NG/ML
RBC # BLD AUTO: 4.92 10E12/L (ref 3.8–5.2)
SODIUM SERPL-SCNC: 136 MMOL/L (ref 134–144)
SOURCE: ABNORMAL
SP GR UR STRIP: 1.02 (ref 1–1.03)
TRICYCLICS UR QL SCN: NOT DETECTED NG/ML
TROPONIN I SERPL-MCNC: <2.3 PG/ML
TSH SERPL DL<=0.05 MIU/L-ACNC: 2.63 IU/ML (ref 0.34–5.6)
UROBILINOGEN UR STRIP-ACNC: 0.2 EU/DL (ref 0.2–1)
WBC # BLD AUTO: 11.9 10E9/L (ref 4–11)

## 2021-04-12 PROCEDURE — 99284 EMERGENCY DEPT VISIT MOD MDM: CPT | Performed by: PHYSICIAN ASSISTANT

## 2021-04-12 PROCEDURE — 85379 FIBRIN DEGRADATION QUANT: CPT | Performed by: PHYSICIAN ASSISTANT

## 2021-04-12 PROCEDURE — 71045 X-RAY EXAM CHEST 1 VIEW: CPT | Mod: TC

## 2021-04-12 PROCEDURE — 36415 COLL VENOUS BLD VENIPUNCTURE: CPT | Performed by: PHYSICIAN ASSISTANT

## 2021-04-12 PROCEDURE — 84443 ASSAY THYROID STIM HORMONE: CPT | Performed by: PHYSICIAN ASSISTANT

## 2021-04-12 PROCEDURE — 81025 URINE PREGNANCY TEST: CPT | Performed by: PHYSICIAN ASSISTANT

## 2021-04-12 PROCEDURE — 85025 COMPLETE CBC W/AUTO DIFF WBC: CPT | Performed by: PHYSICIAN ASSISTANT

## 2021-04-12 PROCEDURE — 999N000157 HC STATISTIC RCP TIME EA 10 MIN

## 2021-04-12 PROCEDURE — 80307 DRUG TEST PRSMV CHEM ANLYZR: CPT | Performed by: PHYSICIAN ASSISTANT

## 2021-04-12 PROCEDURE — 84484 ASSAY OF TROPONIN QUANT: CPT | Performed by: PHYSICIAN ASSISTANT

## 2021-04-12 PROCEDURE — 93010 ELECTROCARDIOGRAM REPORT: CPT | Performed by: INTERNAL MEDICINE

## 2021-04-12 PROCEDURE — 80048 BASIC METABOLIC PNL TOTAL CA: CPT | Performed by: PHYSICIAN ASSISTANT

## 2021-04-12 PROCEDURE — 83735 ASSAY OF MAGNESIUM: CPT | Performed by: PHYSICIAN ASSISTANT

## 2021-04-12 PROCEDURE — 81003 URINALYSIS AUTO W/O SCOPE: CPT | Mod: XU | Performed by: PHYSICIAN ASSISTANT

## 2021-04-12 PROCEDURE — 99285 EMERGENCY DEPT VISIT HI MDM: CPT | Mod: 25 | Performed by: PHYSICIAN ASSISTANT

## 2021-04-12 PROCEDURE — 93242 EXT ECG>48HR<7D RECORDING: CPT

## 2021-04-12 PROCEDURE — 93244 EXT ECG>48HR<7D REV&INTERPJ: CPT | Performed by: INTERNAL MEDICINE

## 2021-04-12 PROCEDURE — 93005 ELECTROCARDIOGRAM TRACING: CPT | Mod: XU | Performed by: PHYSICIAN ASSISTANT

## 2021-04-12 ASSESSMENT — MIFFLIN-ST. JEOR: SCORE: 1449.88

## 2021-04-12 ASSESSMENT — ENCOUNTER SYMPTOMS
WOUND: 0
ADENOPATHY: 0
ABDOMINAL PAIN: 0
SHORTNESS OF BREATH: 0
PALPITATIONS: 1
CONFUSION: 0
HEMATURIA: 0
CHILLS: 0
BRUISES/BLEEDS EASILY: 0
FEVER: 0
BACK PAIN: 0
CHEST TIGHTNESS: 0

## 2021-04-13 ENCOUNTER — TELEPHONE (OUTPATIENT)
Dept: FAMILY MEDICINE | Facility: OTHER | Age: 26
End: 2021-04-13

## 2021-04-13 LAB — INTERPRETATION ECG - MUSE: NORMAL

## 2021-04-13 NOTE — ED PROVIDER NOTES
History     Chief Complaint   Patient presents with     Palpitations     HPI  Nicky Hart is a 25 year old female who presents to the ED for evaluation of palpitations.  She reports that for quite some time she has been noticing some palpitations but that they they seem to be occurring quite more often and with little bit of discomfort in her chest.  She denies any shortness of breath, abdominal pain, dysuria, fevers, cough.  She does take Adderall, which she started 4 months ago, and says that she smokes marijuana from time to time.    Allergies:  Allergies   Allergen Reactions     Bermuda Grass Itching     Dust Mite Extract Itching     Mold Itching     Pollen Extract Itching     Tree Nuts  [Nuts] Itching       Problem List:    Patient Active Problem List    Diagnosis Date Noted     Anterior shin splints 02/12/2020     Priority: Medium     Plantar warts 02/12/2020     Priority: Medium     Adjustment disorder with mixed anxiety and depressed mood 02/12/2020     Priority: Medium     Allergic rhinitis due to pollen 02/28/2018     Priority: Medium     Overview:   Seasonal allergy symptoms.       Depression with anxiety 11/19/2013     Priority: Medium     Dysmenorrhea 06/10/2011     Priority: Medium     Idiopathic scoliosis 06/10/2011     Priority: Medium        Past Medical History:    Past Medical History:   Diagnosis Date     Chronic cough 09/19/2002     Closed fracture of lower end of radius 09/2006     History of early menarche 02/2007     Preeclampsia 9/6/2019     Pregnancy      Subacute endomyometritis 5/16/2017       Past Surgical History:    Past Surgical History:   Procedure Laterality Date     DILATION AND CURETTAGE N/A 05/17/2017    Dr. Hazel; [facility] - retained placenta/endometritis       Family History:    Family History   Problem Relation Age of Onset     GERD Mother      GERD Maternal Grandmother      Ulcerative Colitis Maternal Grandfather        Social History:  Marital Status:   [2]  Social  "History     Tobacco Use     Smoking status: Never Smoker     Smokeless tobacco: Never Used   Substance Use Topics     Alcohol use: Yes     Comment: Alcoholic Drinks/day: rare     Drug use: No     Comment: Drug use: No        Medications:    ADDERALL XR 15 MG 24 hr capsule  amphetamine-dextroamphetamine (ADDERALL) 15 MG tablet  drospirenone-ethinyl estradiol (KYM) 3-0.02 MG tablet  EPINEPHrine (EPIPEN/ADRENACLICK/OR ANY BX GENERIC EQUIV) 0.3 MG/0.3ML injection 2-pack  tiZANidine (ZANAFLEX) 4 MG tablet          Review of Systems   Constitutional: Negative for chills and fever.   HENT: Negative for congestion.    Eyes: Negative for visual disturbance.   Respiratory: Negative for chest tightness and shortness of breath.    Cardiovascular: Positive for palpitations. Negative for chest pain.   Gastrointestinal: Negative for abdominal pain.   Genitourinary: Negative for hematuria.   Musculoskeletal: Negative for back pain.   Skin: Negative for rash and wound.   Neurological: Negative for syncope.   Hematological: Negative for adenopathy. Does not bruise/bleed easily.   Psychiatric/Behavioral: Negative for confusion.       Physical Exam   BP: 125/81  Pulse: 114  Temp: 97.7  F (36.5  C)  Resp: 16  Height: 165.1 cm (5' 5\")  Weight: 69.4 kg (153 lb)  SpO2: 98 %      Physical Exam  Constitutional:       General: She is not in acute distress.     Appearance: She is well-developed. She is not diaphoretic.   HENT:      Head: Normocephalic and atraumatic.   Eyes:      General: No scleral icterus.     Conjunctiva/sclera: Conjunctivae normal.   Neck:      Musculoskeletal: Neck supple.   Cardiovascular:      Rate and Rhythm: Regular rhythm. Tachycardia present.   Pulmonary:      Effort: Pulmonary effort is normal.      Breath sounds: Normal breath sounds.   Abdominal:      Palpations: Abdomen is soft.      Tenderness: There is no abdominal tenderness.   Musculoskeletal:         General: No deformity.   Lymphadenopathy:      Cervical: " No cervical adenopathy.   Skin:     General: Skin is warm and dry.      Findings: No rash.   Neurological:      General: No focal deficit present.      Mental Status: She is alert and oriented to person, place, and time. Mental status is at baseline.      Cranial Nerves: No cranial nerve deficit.      Sensory: No sensory deficit.      Motor: No weakness.      Coordination: Coordination normal.   Psychiatric:         Mood and Affect: Mood normal.         Behavior: Behavior normal.         Thought Content: Thought content normal.         Judgment: Judgment normal.         ED Course        Procedures          EKG read at 1936.  Heart rate 106, sinus tachycardia with occasional PVC.    Critical Care time:  none               Results for orders placed or performed during the hospital encounter of 04/12/21 (from the past 24 hour(s))   EKG 12 lead   Result Value Ref Range    Interpretation ECG Click View Image link to view waveform and result    CBC with platelets differential   Result Value Ref Range    WBC 11.9 (H) 4.0 - 11.0 10e9/L    RBC Count 4.92 3.8 - 5.2 10e12/L    Hemoglobin 14.6 11.7 - 15.7 g/dL    Hematocrit 43.4 35.0 - 47.0 %    MCV 88 78 - 100 fl    MCH 29.7 26.5 - 33.0 pg    MCHC 33.6 31.5 - 36.5 g/dL    RDW 12.3 10.0 - 15.0 %    Platelet Count 362 150 - 450 10e9/L    Diff Method Automated Method     % Neutrophils 64.9 %    % Lymphocytes 25.5 %    % Monocytes 6.0 %    % Eosinophils 2.5 %    % Basophils 0.8 %    % Immature Granulocytes 0.3 %    Absolute Neutrophil 7.7 1.6 - 8.3 10e9/L    Absolute Lymphocytes 3.0 0.8 - 5.3 10e9/L    Absolute Monocytes 0.7 0.0 - 1.3 10e9/L    Absolute Eosinophils 0.3 0.0 - 0.7 10e9/L    Absolute Basophils 0.1 0.0 - 0.2 10e9/L    Abs Immature Granulocytes 0.0 0 - 0.4 10e9/L   D dimer quantitative   Result Value Ref Range    D Dimer 0.5 0.0 - 0.50 ug/ml FEU   Basic metabolic panel   Result Value Ref Range    Sodium 136 134 - 144 mmol/L    Potassium 3.8 3.5 - 5.1 mmol/L    Chloride  104 98 - 107 mmol/L    Carbon Dioxide 24 21 - 31 mmol/L    Anion Gap 8 3 - 14 mmol/L    Glucose 85 70 - 105 mg/dL    Urea Nitrogen 16 7 - 25 mg/dL    Creatinine 1.02 0.60 - 1.20 mg/dL    GFR Estimate 67 >60 mL/min/[1.73_m2]    GFR Estimate If Black 81 >60 mL/min/[1.73_m2]    Calcium 9.7 8.6 - 10.3 mg/dL   Troponin GH   Result Value Ref Range    Troponin <2.3 <34.0 pg/mL   Thyrotropin GH   Result Value Ref Range    Thyrotropin 2.63 0.34 - 5.60 IU/mL   Magnesium   Result Value Ref Range    Magnesium 2.3 1.9 - 2.7 mg/dL   HCG qualitative urine   Result Value Ref Range    HCG Qual Urine Negative NEG^Negative   Drug of Abuse Screen Urine GH   Result Value Ref Range    Amphetamine Qual Urine Presumptive positive-Unconfirmed result (A) NDET^Not Detected    Benzodiazepine Qual Urine Not Detected NDET^Not Detected    Cocaine Qual Urine Not Detected NDET^Not Detected    Methadone Qual Urine Not Detected NDET^Not Detected    PCP Qual Urine Not Detected NDET^Not Detected    Opiates Qualitative Urine Not Detected NDET^Not Detected    Oxycodone Qualitative Urine Not Detected NDET^Not Detected ng/mL    Propoxyphene Qualitative Urine Not Detected NDET^Not Detected ng/mL    Tricyclic Antidepressants Qual Urine Not Detected NDET^Not Detected ng/mL    Methamphetamine Qualitative Urine Not Detected NDET^Not Detected ng/mL    Barbiturates Qual Urine Not Detected NDET^Not Detected    Cannabinoids Qualitative Urine Not Detected NDET^Not Detected ng/mL    Buprenorphine Qualitative Urine Not Detected NDET^Not Detected ng/mL   *UA reflex to Microscopic   Result Value Ref Range    Color Urine Yellow     Appearance Urine Clear     Glucose Urine Negative NEG^Negative mg/dL    Bilirubin Urine Negative NEG^Negative    Ketones Urine Negative NEG^Negative mg/dL    Specific Gravity Urine 1.020 1.000 - 1.030    Blood Urine Trace (A) NEG^Negative    pH Urine 7.5 5.0 - 9.0 pH    Protein Albumin Urine Negative NEG^Negative mg/dL    Urobilinogen Urine  0.2 0.2 - 1.0 EU/dL    Nitrite Urine Negative NEG^Negative    Leukocyte Esterase Urine Negative NEG^Negative    Source Midstream Urine        Medications - No data to display    Assessments & Plan (with Medical Decision Making)   Nontoxic in NAD.     She appears to be having some intermittent extra beats, EKG showing PACs.    Lab work and CXR appear very well.     We will send home with a zio patch monitor and  F/u with her PCP for reassessment. In general, there is no specific treatment necessary. However, if it is found that these are occurring quite often, then adjusting adderall medication may be discussed.     Strict return precautions are given to the pt, they will return if symptoms are worsening or concerning. The pt understands and agrees with the plan and they are discharged.     Kike Blakely PA-C    I have reviewed the nursing notes.    I have reviewed the findings, diagnosis, plan and need for follow up with the patient.       New Prescriptions    No medications on file       Final diagnoses:   PAC (premature atrial contraction)       4/12/2021   [facility]     Kike Blakely PA  04/12/21 [id number]

## 2021-04-13 NOTE — DISCHARGE INSTRUCTIONS
Get plenty of fluids and rest.  As we discussed it looks like you are having some extra beats from time to time otherwise the remainder of your studies look very well.  In general there is not any emergent treatment for these extra beats however a referral was placed for you to follow-up with PCP as well as to wear a heart monitor to see how often these are happening sometimes there can be prevention of medication you can take.  Please return to the ED for worsening or concerning symptoms.

## 2021-04-13 NOTE — PATIENT INSTRUCTIONS
Date Placed on Pt:  04/12/2021    Patient instructed not to:   -take baths, swim, sauna   -remove device prior to 3 days   -use electric blankets   -shower or sweat excessively within first 24 hours of device application  Patient instructed to:   -shower as needed   -be carefull when toweling off and dressing   -press button when cardiac symptoms occur   -document symptoms in log book   -remove and return device (send via mail to [employer])   -Call [employer] with any questions

## 2021-04-13 NOTE — ED NOTES
Pt resting on cot. States continues to feel heart palpitations. HR in the 90's, Sinus rhythm on the monitor with occasional PVC noted. Denies chest pain or SOB.

## 2021-04-13 NOTE — TELEPHONE ENCOUNTER
Patient was seen in ED yesterday for premature atrial contraction. She states Dr. Blakely would like patient to be seen for a follow up this week. Patient did not want to see another provider.     Please call patient for a work in.     Lavinia Curry on 4/13/2021 at 9:27 AM

## 2021-04-13 NOTE — TELEPHONE ENCOUNTER
Left message to call back....................  4/13/2021   9:53 AM  Stella Boyd LPN, LPN  4/13/2021  9:53 AM

## 2021-04-20 ENCOUNTER — VIRTUAL VISIT (OUTPATIENT)
Dept: FAMILY MEDICINE | Facility: OTHER | Age: 26
End: 2021-04-20
Attending: FAMILY MEDICINE
Payer: COMMERCIAL

## 2021-04-20 DIAGNOSIS — G44.219 EPISODIC TENSION-TYPE HEADACHE, NOT INTRACTABLE: Primary | ICD-10-CM

## 2021-04-20 PROCEDURE — 99213 OFFICE O/P EST LOW 20 MIN: CPT | Mod: TEL | Performed by: FAMILY MEDICINE

## 2021-04-20 RX ORDER — TOPIRAMATE 50 MG/1
TABLET, FILM COATED ORAL
Qty: 60 TABLET | Refills: 1 | Status: SHIPPED | OUTPATIENT
Start: 2021-04-20 | End: 2021-05-13

## 2021-04-20 RX ORDER — ELETRIPTAN HYDROBROMIDE 20 MG/1
20 TABLET, FILM COATED ORAL
Qty: 30 TABLET | Refills: 2 | Status: SHIPPED | OUTPATIENT
Start: 2021-04-20 | End: 2021-12-29

## 2021-04-20 ASSESSMENT — ANXIETY QUESTIONNAIRES
GAD7 TOTAL SCORE: 5
3. WORRYING TOO MUCH ABOUT DIFFERENT THINGS: SEVERAL DAYS
1. FEELING NERVOUS, ANXIOUS, OR ON EDGE: SEVERAL DAYS
5. BEING SO RESTLESS THAT IT IS HARD TO SIT STILL: NOT AT ALL
2. NOT BEING ABLE TO STOP OR CONTROL WORRYING: SEVERAL DAYS
7. FEELING AFRAID AS IF SOMETHING AWFUL MIGHT HAPPEN: SEVERAL DAYS
IF YOU CHECKED OFF ANY PROBLEMS ON THIS QUESTIONNAIRE, HOW DIFFICULT HAVE THESE PROBLEMS MADE IT FOR YOU TO DO YOUR WORK, TAKE CARE OF THINGS AT HOME, OR GET ALONG WITH OTHER PEOPLE: NOT DIFFICULT AT ALL
6. BECOMING EASILY ANNOYED OR IRRITABLE: SEVERAL DAYS

## 2021-04-20 ASSESSMENT — PATIENT HEALTH QUESTIONNAIRE - PHQ9
5. POOR APPETITE OR OVEREATING: NOT AT ALL
SUM OF ALL RESPONSES TO PHQ QUESTIONS 1-9: 3

## 2021-04-20 ASSESSMENT — PAIN SCALES - GENERAL: PAINLEVEL: NO PAIN (0)

## 2021-04-20 NOTE — PROGRESS NOTES
"Nicky is a 25 year old who is being evaluated via a billable telephone visit.      What phone number would you like to be contacted at? [phone]  How would you like to obtain your AVS? MyChart    Assessment & Plan     Episodic tension-type headache, not intractable  Likely cause vs atypical migraines.  Continue headache diary.  As things are just under 8 days/month with the more shock type symptoms - would elect to trial eletriptan therapy prn.  Instructed on limited use.  If not helpful after ~1 month, would complete a trial of Topiramate.  Goal: 50% headache reduction.   Last imaging was CT head ~5 years ago and normal; but consider MRI if not improving.  - eletriptan (RELPAX) 20 MG tablet; Take 1 tablet (20 mg) by mouth at onset of headache for migraine May repeat in 2 hours. Max 4 tablets/24 hours.  - topiramate (TOPAMAX) 50 MG tablet; Take 1 tablet (50 mg) by mouth daily for 14 days, THEN 1 tablet (50 mg) 2 times daily for 14 days.    Return in about 2 months (around 6/20/2021) for Headache follow up.    Arpita Carrington, [facility]    Subjective   Nicky is a 25 year old who presents for the following health issues:    HPI   Nicky is being contacted in follow up for headaches.   Has stopped using as much Excedrin, estimating ~3 uses since our last visit approximately 1 month ago.  One was for Covid vaccine symptoms of: body aches/malaise.    She can also at times get headaches that feel like \"shocks\" type aches that come on rather suddenly and go away suddenly.  Can have some blurred vision at times with it; but resolves.  Typically occurs a few times throughout the day (lasting ~30 seconds to a couple minutes).   Recurring every ~4 days through the month.  Has had eyes checked and not contributing.    Review of Systems   CONSTITUTIONAL: NEGATIVE for fever, chills, change in weight  ENT/MOUTH: NEGATIVE for ear, mouth and throat problems  RESP: NEGATIVE for significant cough or " SOB  CV: NEGATIVE for chest pain, palpitations or peripheral edema      Objective    LMP 04/17/2021 (Exact Date)     Physical Exam   healthy, alert and no distress  PSYCH: Alert and oriented times 3; coherent speech, normal   rate and volume, able to articulate logical thoughts, able   to abstract reason, no tangential thoughts, no hallucinations   or delusions  Her affect is normal  RESP: No cough, no audible wheezing, able to talk in full sentences  Remainder of exam unable to be completed due to telephone visits    No new diagnostics        Phone call duration: 8 minutes  Start: 4:04 PM  Stop: 4:12 PM

## 2021-04-20 NOTE — NURSING NOTE
"Chief Complaint   Patient presents with     RECHECK     headaches       Initial LMP 04/17/2021 (Exact Date)  Estimated body mass index is 25.46 kg/m  as calculated from the following:    Height as of 4/12/21: 1.651 m (5' 5\").    Weight as of 4/12/21: 69.4 kg (153 lb).  Medication Reconciliation: complete    Stella Boyd LPN  "

## 2021-04-21 ASSESSMENT — ANXIETY QUESTIONNAIRES: GAD7 TOTAL SCORE: 5

## 2021-05-11 DIAGNOSIS — G44.219 EPISODIC TENSION-TYPE HEADACHE, NOT INTRACTABLE: ICD-10-CM

## 2021-05-12 NOTE — TELEPHONE ENCOUNTER
[facility] sent Rx request for the following:         topiramate (TOPAMAX) 50 MG tablet [Pharmacy Med Name: TOPIRAMATE 50 MG TABLET] 60 tablet 1     Sig: TAKE 1 TABLET (50 MG) BY MOUTH DAILY FOR 14 DAYS, THEN 1 TABLET (50 MG) 2 TIMES DAILY FOR 14 DAYS.      Last Prescription Date:   4/20/2021  Last Fill Qty/Refills:         60, R-1    Last Office Visit:              4/20/2021 VV Charissa   Future Office visit:           none    Routing refill request to provider for review/approval because:  Rx has end date.         Anti-Seizure Meds Protocol  Failed - 5/11/2021 10:31 AM        Failed - Review Authorizing provider's last note.      Refer to last progress notes: confirm request is for original authorizing provider (cannot be through other providers).          Failed - Normal CBC on file in past 26 months     Recent Labs   Lab Test 04/12/21 2058 05/18/17  0533 05/18/17  0533   WBC 11.9*   < >  --    G[facility]WBC  --   --  8.8   RBC 4.92   < >  --    GICHRBC  --   --  4.05   HGB 14.6   < > 12.2   HCT 43.4   < > 36.4      < > 289    < > = values in this interval not displayed.                 Failed - Normal ALT or AST on file in past 26 months     Recent Labs   Lab Test 09/16/19  1028 11/30/16  1152 11/30/16  1152   ALT 43   < >  --    GICHALT  --   --  19    < > = values in this interval not displayed.     Recent Labs   Lab Test 09/16/19  1028 11/30/16  1152 11/30/16  1152   AST 40*   < >  --    GICHAST  --   --  17    < > = values in this interval not displayed.        Unable to complete prescription refill per RN Medication Refill Policy.................... Allison Linn RN ....................  5/12/2021   9:34 AM

## 2021-05-13 RX ORDER — TOPIRAMATE 50 MG/1
TABLET, FILM COATED ORAL
Qty: 60 TABLET | Refills: 1 | Status: SHIPPED | OUTPATIENT
Start: 2021-05-13 | End: 2021-05-18

## 2021-05-18 DIAGNOSIS — G44.219 EPISODIC TENSION-TYPE HEADACHE, NOT INTRACTABLE: ICD-10-CM

## 2021-05-18 RX ORDER — TOPIRAMATE 50 MG/1
50 TABLET, FILM COATED ORAL 2 TIMES DAILY
Qty: 180 TABLET | Refills: 1 | Status: SHIPPED | OUTPATIENT
Start: 2021-05-18 | End: 2021-08-06

## 2021-05-18 NOTE — PROGRESS NOTES
Fax received with two different sigs on Rx; clarification sent to pharmacy.  Arpita Carrington DO on 5/18/2021 at 8:48 AM

## 2021-08-06 ENCOUNTER — VIRTUAL VISIT (OUTPATIENT)
Dept: FAMILY MEDICINE | Facility: OTHER | Age: 26
End: 2021-08-06
Attending: FAMILY MEDICINE
Payer: COMMERCIAL

## 2021-08-06 VITALS — BODY MASS INDEX: 22.47 KG/M2 | WEIGHT: 135 LBS

## 2021-08-06 DIAGNOSIS — G44.219 EPISODIC TENSION-TYPE HEADACHE, NOT INTRACTABLE: Primary | ICD-10-CM

## 2021-08-06 PROCEDURE — 99213 OFFICE O/P EST LOW 20 MIN: CPT | Mod: TEL | Performed by: FAMILY MEDICINE

## 2021-08-06 RX ORDER — TOPIRAMATE 25 MG/1
25 TABLET, FILM COATED ORAL DAILY
Qty: 90 TABLET | Refills: 1 | Status: SHIPPED | OUTPATIENT
Start: 2021-08-06 | End: 2021-09-17

## 2021-08-06 ASSESSMENT — PAIN SCALES - GENERAL: PAINLEVEL: MODERATE PAIN (4)

## 2021-08-06 NOTE — NURSING NOTE
"Chief Complaint   Patient presents with     Recheck Medication       Initial Wt 61.2 kg (135 lb)   LMP  (LMP Unknown)   BMI 22.47 kg/m   Estimated body mass index is 22.47 kg/m  as calculated from the following:    Height as of 4/12/21: 1.651 m (5' 5\").    Weight as of this encounter: 61.2 kg (135 lb).  Medication Reconciliation: complete    Stella Boyd LPN     FOOD SECURITY SCREENING QUESTIONS  Hunger Vital Signs:  Within the past 12 months we worried whether our food would run out before we got money to buy more. Never  Within the past 12 months the food we bought just didn't last and we didn't have money to get more. Never  Stella Boyd LPN 8/6/2021 8:54 AM    "

## 2021-08-06 NOTE — PROGRESS NOTES
"Nicky is a 26 year old who is being evaluated via a billable telephone visit.      What phone number would you like to be contacted at? [phone]  How would you like to obtain your AVS? Puneet    Assessment & Plan     Episodic tension-type headache, not intractable  Medication improved her headaches significantly but she is uncomfortable with her amount of weight loss in a short time frame.  She is reassured re: normal BMI range, and will try the lower dosage of 25mg daily to see if we can continue to capture control of her headaches, but reduce the amount of weight loss.  Follow up prn.  - topiramate (TOPAMAX) 25 MG tablet; Take 1 tablet (25 mg) by mouth daily    BMI:   Estimated body mass index is 25.46 kg/m  as calculated from the following:    Height as of 4/12/21: 1.651 m (5' 5\").    Weight as of 4/12/21: 69.4 kg (153 lb).     Arpita Carrington, [facility]    Subjective   Nicky is a 26 year old who presents for the following health issues:    HPI   Nicky was seen for tension vs atypical migraines on 4/20/2021; we attempted eletriptan at that time.  If was not noticing significant improvement - we discussed possible Topiramate and possible imaging.    Since our last visit: she had significant improvement in her headaches; but she has lost ~20# on the medication which makes her worried.  She is wondering about a lower dose.   No abdominal pain; just more anorexia.    Review of Systems   CONSTITUTIONAL: NEGATIVE for fever, chills, night sweats.  ENT/MOUTH: NEGATIVE for ear, mouth and throat problems  RESP: NEGATIVE for significant cough or SOB  CV: NEGATIVE for chest pain, palpitations or peripheral edema      Objective    Wt 61.2 kg (135 lb)   LMP  (LMP Unknown)   BMI 22.47 kg/m      Physical Exam   healthy, alert and no distress  PSYCH: Alert and oriented times 3; coherent speech, normal   rate and volume, able to articulate logical thoughts, able   to abstract reason, no " tangential thoughts, no hallucinations   or delusions  Her affect is normal  RESP: No cough, no audible wheezing, able to talk in full sentences  Remainder of exam unable to be completed due to telephone visits    No new diagnostics      Phone call duration: 5 minutes  Start: 10:41 AM  Stop: 10:

## 2021-08-09 ENCOUNTER — HOSPITAL ENCOUNTER (EMERGENCY)
Facility: OTHER | Age: 26
Discharge: HOME | End: 2021-08-09
Attending: PHYSICIAN ASSISTANT | Admitting: PHYSICIAN ASSISTANT
Payer: COMMERCIAL

## 2021-08-09 VITALS
HEIGHT: 65 IN | HEART RATE: 98 BPM | DIASTOLIC BLOOD PRESSURE: 77 MMHG | SYSTOLIC BLOOD PRESSURE: 119 MMHG | BODY MASS INDEX: 22.49 KG/M2 | OXYGEN SATURATION: 100 % | RESPIRATION RATE: 11 BRPM | TEMPERATURE: 98 F | WEIGHT: 135 LBS

## 2021-08-09 DIAGNOSIS — R55 SYNCOPE: ICD-10-CM

## 2021-08-09 LAB
ALBUMIN UR-MCNC: NEGATIVE MG/DL
AMPHETAMINES UR QL: ABNORMAL
ANION GAP SERPL CALCULATED.3IONS-SCNC: 10 MMOL/L (ref 3–14)
APPEARANCE UR: CLEAR
BACTERIA #/AREA URNS HPF: ABNORMAL /HPF
BARBITURATES UR QL SCN: NOT DETECTED
BASOPHILS # BLD AUTO: 0.1 10E3/UL (ref 0–0.2)
BASOPHILS NFR BLD AUTO: 1 %
BENZODIAZ UR QL SCN: NOT DETECTED
BILIRUB UR QL STRIP: NEGATIVE
BUN SERPL-MCNC: 14 MG/DL (ref 7–25)
BUPRENORPHINE UR QL: NOT DETECTED
CALCIUM SERPL-MCNC: 10 MG/DL (ref 8.6–10.3)
CANNABINOIDS UR QL: NOT DETECTED
CHLORIDE BLD-SCNC: 104 MMOL/L (ref 98–107)
CO2 SERPL-SCNC: 23 MMOL/L (ref 21–31)
COCAINE UR QL SCN: NOT DETECTED
COLOR UR AUTO: ABNORMAL
CREAT SERPL-MCNC: 1 MG/DL (ref 0.6–1.2)
D-METHAMPHET UR QL: NOT DETECTED
EOSINOPHIL # BLD AUTO: 0 10E3/UL (ref 0–0.7)
EOSINOPHIL NFR BLD AUTO: 0 %
ERYTHROCYTE [DISTWIDTH] IN BLOOD BY AUTOMATED COUNT: 12.9 % (ref 10–15)
GFR SERPL CREATININE-BSD FRML MDRD: 79 ML/MIN/1.73M2
GLUCOSE BLD-MCNC: 108 MG/DL (ref 70–105)
GLUCOSE UR STRIP-MCNC: NEGATIVE MG/DL
HCG UR QL: NEGATIVE
HCT VFR BLD AUTO: 39.5 % (ref 35–47)
HGB BLD-MCNC: 13.1 G/DL (ref 11.7–15.7)
HGB UR QL STRIP: ABNORMAL
HOLD SPECIMEN: NORMAL
HOLD SPECIMEN: NORMAL
HYALINE CASTS: 4 /LPF
IMM GRANULOCYTES # BLD: 0 10E3/UL
IMM GRANULOCYTES NFR BLD: 0 %
KETONES UR STRIP-MCNC: ABNORMAL MG/DL
LEUKOCYTE ESTERASE UR QL STRIP: NEGATIVE
LYMPHOCYTES # BLD AUTO: 1.4 10E3/UL (ref 0.8–5.3)
LYMPHOCYTES NFR BLD AUTO: 14 %
MAGNESIUM SERPL-MCNC: 2.4 MG/DL (ref 1.9–2.7)
MCH RBC QN AUTO: 29.5 PG (ref 26.5–33)
MCHC RBC AUTO-ENTMCNC: 33.2 G/DL (ref 31.5–36.5)
MCV RBC AUTO: 89 FL (ref 78–100)
METHADONE UR QL SCN: NOT DETECTED
MONOCYTES # BLD AUTO: 0.4 10E3/UL (ref 0–1.3)
MONOCYTES NFR BLD AUTO: 4 %
MUCOUS THREADS #/AREA URNS LPF: PRESENT /LPF
NEUTROPHILS # BLD AUTO: 7.8 10E3/UL (ref 1.6–8.3)
NEUTROPHILS NFR BLD AUTO: 81 %
NITRATE UR QL: NEGATIVE
NRBC # BLD AUTO: 0 10E3/UL
NRBC BLD AUTO-RTO: 0 /100
OPIATES UR QL SCN: NOT DETECTED
OXYCODONE UR QL SCN: NOT DETECTED
PCP UR QL SCN: NOT DETECTED
PH UR STRIP: 5 [PH] (ref 5–9)
PLATELET # BLD AUTO: 423 10E3/UL (ref 150–450)
POTASSIUM BLD-SCNC: 3.6 MMOL/L (ref 3.5–5.1)
PROPOXYPH UR QL: NOT DETECTED
RBC # BLD AUTO: 4.44 10E6/UL (ref 3.8–5.2)
RBC URINE: 2 /HPF
SODIUM SERPL-SCNC: 137 MMOL/L (ref 134–144)
SP GR UR STRIP: 1.02 (ref 1–1.03)
TRICYCLICS UR QL SCN: NOT DETECTED
TROPONIN I SERPL-MCNC: <2.4 PG/ML (ref 0–34)
TSH SERPL DL<=0.005 MIU/L-ACNC: 3.17 MU/L (ref 0.4–4)
UROBILINOGEN UR STRIP-MCNC: NORMAL MG/DL
WBC # BLD AUTO: 9.7 10E3/UL (ref 4–11)
WBC URINE: 1 /HPF

## 2021-08-09 PROCEDURE — 99283 EMERGENCY DEPT VISIT LOW MDM: CPT | Performed by: PHYSICIAN ASSISTANT

## 2021-08-09 PROCEDURE — 84443 ASSAY THYROID STIM HORMONE: CPT | Performed by: PHYSICIAN ASSISTANT

## 2021-08-09 PROCEDURE — 81001 URINALYSIS AUTO W/SCOPE: CPT | Performed by: PHYSICIAN ASSISTANT

## 2021-08-09 PROCEDURE — 83735 ASSAY OF MAGNESIUM: CPT | Performed by: PHYSICIAN ASSISTANT

## 2021-08-09 PROCEDURE — 36415 COLL VENOUS BLD VENIPUNCTURE: CPT | Performed by: PHYSICIAN ASSISTANT

## 2021-08-09 PROCEDURE — 84484 ASSAY OF TROPONIN QUANT: CPT | Performed by: PHYSICIAN ASSISTANT

## 2021-08-09 PROCEDURE — 81025 URINE PREGNANCY TEST: CPT | Performed by: PHYSICIAN ASSISTANT

## 2021-08-09 PROCEDURE — 93005 ELECTROCARDIOGRAM TRACING: CPT | Performed by: PHYSICIAN ASSISTANT

## 2021-08-09 PROCEDURE — 85025 COMPLETE CBC W/AUTO DIFF WBC: CPT | Performed by: PHYSICIAN ASSISTANT

## 2021-08-09 PROCEDURE — 93010 ELECTROCARDIOGRAM REPORT: CPT | Performed by: INTERNAL MEDICINE

## 2021-08-09 PROCEDURE — 80306 DRUG TEST PRSMV INSTRMNT: CPT | Performed by: PHYSICIAN ASSISTANT

## 2021-08-09 PROCEDURE — 80048 BASIC METABOLIC PNL TOTAL CA: CPT | Performed by: PHYSICIAN ASSISTANT

## 2021-08-09 PROCEDURE — 99284 EMERGENCY DEPT VISIT MOD MDM: CPT | Performed by: PHYSICIAN ASSISTANT

## 2021-08-09 ASSESSMENT — ENCOUNTER SYMPTOMS
BRUISES/BLEEDS EASILY: 0
HEMATURIA: 0
SHORTNESS OF BREATH: 0
ABDOMINAL PAIN: 0
CHEST TIGHTNESS: 0
WOUND: 0
CONFUSION: 0
FEVER: 0
ADENOPATHY: 0
BACK PAIN: 0
CHILLS: 0

## 2021-08-09 ASSESSMENT — MIFFLIN-ST. JEOR: SCORE: 1363.24

## 2021-08-09 NOTE — DISCHARGE INSTRUCTIONS
Get plenty of fluids and rest.  As we discussed all of your lab work, vital signs, EKG and physical exam appear very well today.  Is difficult to say what caused her symptoms.  Is possible this may been a vasovagal type syncopal/near syncopal event.  Be sure you are eating plenty of good food.  Please follow-up with PCP as needed, return ED if there are worsening or concerning symptoms.

## 2021-08-09 NOTE — ED TRIAGE NOTES
"Pt here with mother, pt reports that about 1 hour ago she was at work, she developed lightheadedness, narrowed vision and nausea, pt walked out into the hallway and passed out on the ground, pt reports no injuries when she fell, pt reports a lot of stress recently and had palpitations and wore a holter monitor last month, pt into [facility] ambulatory, monitors on  ED Nursing Triage Note (General)   ________________________________    Nickybeka Hart is a 26 year old Female that presents to triage private car  With history of  Syncopal episode reported by patient   Significant symptoms had onset 1 hour(s) ago.  /79   Pulse 86   Temp 98  F (36.7  C) (Tympanic)   Resp 16   Ht 1.651 m (5' 5\")   Wt 61.2 kg (135 lb)   LMP  (LMP Unknown)   SpO2 100%   BMI 22.47 kg/m  t  Patient appears alert  and oriented, in no acute distress., and cooperative and pleasant behavior.    GCS Total = 15  Airway: intact  Breathing noted as Normal  Circulation Normal  Skin:  Normal  Action taken:  Triage to critical care immediately      PRE HOSPITAL [facility] LIVING SITUATION Spouse and Children    "

## 2021-08-09 NOTE — ED PROVIDER NOTES
History     Chief Complaint   Patient presents with     Syncope     HPI  Nicky Hart is a 26 year old female who presents to the ED for evaluation of syncope. pt reports that about 1 hour ago she was at work, she developed lightheadedness, narrowed vision and nausea, pt walked out into the hallway and passed out on the ground, pt reports no injuries when she fell, pt reports a lot of stress recently and had palpitations and wore a holter monitor last month. She denies chest pain, sob, abd pain, dysuria, diarrhea.     Allergies:  Allergies   Allergen Reactions     [place] Grass Itching     Dust Mite Extract Itching     Mold Itching     Pollen Extract Itching     Tree Nuts  [Nuts] Itching       Problem List:    Patient Active Problem List    Diagnosis Date Noted     Anterior shin splints 02/12/2020     Priority: Medium     Plantar warts 02/12/2020     Priority: Medium     Adjustment disorder with mixed anxiety and depressed mood 02/12/2020     Priority: Medium     Allergic rhinitis due to pollen 02/28/2018     Priority: Medium     Overview:   Seasonal allergy symptoms.       Depression with anxiety 11/19/2013     Priority: Medium     Dysmenorrhea 06/10/2011     Priority: Medium     Idiopathic scoliosis 06/10/2011     Priority: Medium        Past Medical History:    Past Medical History:   Diagnosis Date     Chronic cough 09/19/2002     Closed fracture of lower end of radius 09/2006     History of early menarche 02/2007     Preeclampsia 9/6/2019     Pregnancy      Subacute endomyometritis 5/16/2017       Past Surgical History:    Past Surgical History:   Procedure Laterality Date     DILATION AND CURETTAGE N/A 05/17/2017    Dr. Hazel; [facility] - retained placenta/endometritis       Family History:    Family History   Problem Relation Age of Onset     GERD Mother      GERD Maternal Grandmother      Ulcerative Colitis Maternal Grandfather        Social History:  Marital Status:   [2]  Social History     Tobacco  "Use     Smoking status: Never Smoker     Smokeless tobacco: Never Used   Substance Use Topics     Alcohol use: Yes     Comment: Alcoholic Drinks/day: rare     Drug use: No     Comment: Drug use: No        Medications:    ADDERALL XR 30 MG 24 hr capsule  drospirenone-ethinyl estradiol (KYM) 3-0.02 MG tablet  eletriptan (RELPAX) 20 MG tablet  EPINEPHrine (EPIPEN/ADRENACLICK/OR ANY BX GENERIC EQUIV) 0.3 MG/0.3ML injection 2-pack  tiZANidine (ZANAFLEX) 4 MG tablet  topiramate (TOPAMAX) 25 MG tablet          Review of Systems   Constitutional: Negative for chills and fever.   HENT: Negative for congestion.    Eyes: Negative for visual disturbance.   Respiratory: Negative for chest tightness and shortness of breath.    Cardiovascular: Negative for chest pain.   Gastrointestinal: Negative for abdominal pain.   Genitourinary: Negative for hematuria.   Musculoskeletal: Negative for back pain.   Skin: Negative for rash and wound.   Neurological: Positive for syncope.   Hematological: Negative for adenopathy. Does not bruise/bleed easily.   Psychiatric/Behavioral: Negative for confusion.       Physical Exam   BP: 110/79  Pulse: 86  Temp: 98  F (36.7  C)  Resp: 16  Height: 165.1 cm (5' 5\")  Weight: 61.2 kg (135 lb)  SpO2: 100 %      Physical Exam  Constitutional:       General: She is not in acute distress.     Appearance: She is well-developed. She is not diaphoretic.   HENT:      Head: Normocephalic and atraumatic.   Eyes:      General: No scleral icterus.     Extraocular Movements: Extraocular movements intact.      Conjunctiva/sclera: Conjunctivae normal.      Pupils: Pupils are equal, round, and reactive to light.   Cardiovascular:      Rate and Rhythm: Normal rate and regular rhythm.   Pulmonary:      Effort: Pulmonary effort is normal.      Breath sounds: Normal breath sounds.   Abdominal:      Palpations: Abdomen is soft.      Tenderness: There is no abdominal tenderness.   Musculoskeletal:         General: No " deformity. Normal range of motion.      Cervical back: Neck supple.   Lymphadenopathy:      Cervical: No cervical adenopathy.   Skin:     General: Skin is warm and dry.      Findings: No rash.   Neurological:      General: No focal deficit present.      Mental Status: She is alert and oriented to person, place, and time. Mental status is at baseline.      Cranial Nerves: No cranial nerve deficit.      Sensory: No sensory deficit.      Motor: No weakness.      Coordination: Coordination normal.   Psychiatric:         Mood and Affect: Mood normal.         Behavior: Behavior normal.         Thought Content: Thought content normal.         Judgment: Judgment normal.         ED Course        Procedures         EKG read at 1204. HR 75, NSR with sinus arrhythmia, no ST changes.     Critical Care time:  none               Results for orders placed or performed during the hospital encounter of 08/09/21 (from the past 24 hour(s))   CBC with platelets differential    Narrative    The following orders were created for panel order CBC with platelets differential.  Procedure                               Abnormality         Status                     ---------                               -----------         ------                     CBC with platelets and d...[[id number]]                      Final result                 Please view results for these tests on the individual orders.   Basic metabolic panel   Result Value Ref Range    Sodium 137 134 - 144 mmol/L    Potassium 3.6 3.5 - 5.1 mmol/L    Chloride 104 98 - 107 mmol/L    Carbon Dioxide (CO2) 23 21 - 31 mmol/L    Anion Gap 10 3 - 14 mmol/L    Urea Nitrogen 14 7 - 25 mg/dL    Creatinine 1.00 0.60 - 1.20 mg/dL    Calcium 10.0 8.6 - 10.3 mg/dL    Glucose 108 (H) 70 - 105 mg/dL    GFR Estimate 79 >60 mL/min/1.73m2   Troponin I   Result Value Ref Range    Troponin I <2.4 0.0 - 34.0 pg/mL   Thyrotropin GH   Result Value Ref Range    TSH 3.17 0.40 - 4.00 mU/L   Magnesium    Result Value Ref Range    Magnesium 2.4 1.9 - 2.7 mg/dL   Extra Tube    Narrative    The following orders were created for panel order Extra Tube.  Procedure                               Abnormality         Status                     ---------                               -----------         ------                     Extra Blue Top Tube[[id number]]                              Final result               Extra Green Top (Lithium...[[id number]]                      Final result                 Please view results for these tests on the individual orders.   Extra Blue Top Tube   Result Value Ref Range    Hold Specimen JIC    Extra Green Top (Lithium Heparin) ON ICE   Result Value Ref Range    Hold Specimen JIC    CBC with platelets and differential   Result Value Ref Range    WBC Count 9.7 4.0 - 11.0 10e3/uL    RBC Count 4.44 3.80 - 5.20 10e6/uL    Hemoglobin 13.1 11.7 - 15.7 g/dL    Hematocrit 39.5 35.0 - 47.0 %    MCV 89 78 - 100 fL    MCH 29.5 26.5 - 33.0 pg    MCHC 33.2 31.5 - 36.5 g/dL    RDW 12.9 10.0 - 15.0 %    Platelet Count 423 150 - 450 10e3/uL    % Neutrophils 81 %    % Lymphocytes 14 %    % Monocytes 4 %    % Eosinophils 0 %    % Basophils 1 %    % Immature Granulocytes 0 %    NRBCs per 100 WBC 0 <1 /100    Absolute Neutrophils 7.8 1.6 - 8.3 10e3/uL    Absolute Lymphocytes 1.4 0.8 - 5.3 10e3/uL    Absolute Monocytes 0.4 0.0 - 1.3 10e3/uL    Absolute Eosinophils 0.0 0.0 - 0.7 10e3/uL    Absolute Basophils 0.1 0.0 - 0.2 10e3/uL    Absolute Immature Granulocytes 0.0 <=0.0 10e3/uL    Absolute NRBCs 0.0 10e3/uL   UA reflex to Microscopic   Result Value Ref Range    Color Urine Light Yellow Colorless, Straw, Light Yellow, Yellow    Appearance Urine Clear Clear    Glucose Urine Negative Negative mg/dL    Bilirubin Urine Negative Negative    Ketones Urine Trace (A) Negative mg/dL    Specific Gravity Urine 1.016 1.000 - 1.030    Blood Urine Small (A) Negative    pH Urine 5.0 5.0 - 9.0    Protein Albumin Urine  Negative Negative mg/dL    Urobilinogen Urine Normal Normal, 2.0 mg/dL    Nitrite Urine Negative Negative    Leukocyte Esterase Urine Negative Negative    Bacteria Urine Few (A) None Seen /HPF    RBC Urine 2 <=2 /HPF    WBC Urine 1 <=5 /HPF    Mucus Urine Present (A) None Seen /LPF    Hyaline Casts Urine 4 (H) <=2 /LPF   Urine Drugs of Abuse Screen    Narrative    The following orders were created for panel order Urine Drugs of Abuse Screen.  Procedure                               Abnormality         Status                     ---------                               -----------         ------                     Urine Drugs of Abuse Scr...[[id number]]  Abnormal            Final result                 Please view results for these tests on the individual orders.   Urine Drugs of Abuse Screen Panel 13   Result Value Ref Range    Cannabinoids (34-zpe-4-carboxy-9-THC) Not Detected Not Detected    Phencyclidine Not Detected Not Detected    Cocaine (Benzoylecgonine) Not Detected Not Detected    Methamphetamine (d-Methamphetamine) Not Detected Not Detected    Opiates (Morphine) Not Detected Not Detected    Amphetamine (d-Amphetamine) Presumptive positive-Unconfirmed result (A) Not Detected    Benzodiazepines (Nordiazepam) Not Detected Not Detected    Tricyclic Antidepressants (Desipramine) Not Detected Not Detected    Methadone Not Detected Not Detected    Barbiturates (Butalbital) Not Detected Not Detected    Oxycodone Not Detected Not Detected    Propoxyphene (Norpropoxyphene) Not Detected Not Detected    Buprenorphine Not Detected Not Detected   HCG qualitative urine (UPT)   Result Value Ref Range    hCG Urine Qualitative Negative Negative       Medications - No data to display    Assessments & Plan (with Medical Decision Making)   IMPRESSION:   The patient presents to the ED for evaluation of syncope. The patient arrives asymptomatic. At present, given the patient's history and my examination, the etiology for the  syncopal episode is most likely vasovagal. While less likely, other possible etiologies for the patient's symptoms includes neurocardiogenic syncope, cardiac arrhythmias, blood loss, hypovolemia, ACS, tamponade, pulmonary embolism, aortic dissection, and subarachnoid hemorrhage. Additionally, possible mimics of syncope include seizure and stroke also the history seems most consistent with a true syncopal episode. Ultimately, I do not think the patient has a PE or aortic dissection and we will not pursue further workup at this time.    She has no leukocytosis, normal hemoglobin, BMP unremarkable.  She has a negative troponin, normal TSH, magnesium is normal.  She is hCG negative, positive for amphetamines but she is on Adderall.  Urinalysis shows trace ketones, no signs of infection.    [place] syncope score: -3, very low risk, 0.4% risk of 30-day serious adverse event.     Strict return precautions are given to the pt, they will return if symptoms are worsening or concerning. The pt understands and agrees with the plan and they are discharged.     Kike Blakely PA-C      I have reviewed the nursing notes.    I have reviewed the findings, diagnosis, plan and need for follow up with the patient.       Discharge Medication List as of 8/9/2021  1:42 PM          Final diagnoses:   Syncope       8/9/2021   [facility] AND Kike Roth PA  08/09/21 2712

## 2021-08-10 LAB
ATRIAL RATE - MUSE: 75 BPM
DIASTOLIC BLOOD PRESSURE - MUSE: NORMAL MMHG
INTERPRETATION ECG - MUSE: NORMAL
P AXIS - MUSE: 69 DEGREES
PR INTERVAL - MUSE: 142 MS
QRS DURATION - MUSE: 78 MS
QT - MUSE: 382 MS
QTC - MUSE: 426 MS
R AXIS - MUSE: 68 DEGREES
SYSTOLIC BLOOD PRESSURE - MUSE: NORMAL MMHG
T AXIS - MUSE: 60 DEGREES
VENTRICULAR RATE- MUSE: 75 BPM

## 2021-08-18 ENCOUNTER — ALLIED HEALTH/NURSE VISIT (OUTPATIENT)
Dept: FAMILY MEDICINE | Facility: OTHER | Age: 26
End: 2021-08-18
Attending: FAMILY MEDICINE
Payer: COMMERCIAL

## 2021-08-18 ENCOUNTER — TELEPHONE (OUTPATIENT)
Dept: EMERGENCY MEDICINE | Facility: CLINIC | Age: 26
End: 2021-08-18

## 2021-08-18 DIAGNOSIS — Z20.822 EXPOSURE TO COVID-19 VIRUS: Primary | ICD-10-CM

## 2021-08-18 LAB — SARS-COV-2 RNA RESP QL NAA+PROBE: POSITIVE

## 2021-08-18 PROCEDURE — U0005 INFEC AGEN DETEC AMPLI PROBE: HCPCS | Mod: ZL

## 2021-08-18 PROCEDURE — C9803 HOPD COVID-19 SPEC COLLECT: HCPCS

## 2021-08-18 NOTE — TELEPHONE ENCOUNTER
Coronavirus (COVID-19) Notification    Reason for call  Notify of POSITIVE  COVID-19 lab result, assess symptoms,  review [facility] recommendations    Lab Result   Lab test for 2019-nCoV rRt-PCR or SARS-COV-2 PCR  Oropharyngeal AND/OR nasopharyngeal swabs were POSITIVE for 2019-nCoV RNA [OR] SARS-COV-2 RNA (COVID-19) RNA     We have been unable to reach Patient by phone at this time to notify of their Positive COVID-19 result.  Left voicemail message requesting a call back to [phone] [facility] for results.        POSITIVE COVID-19 Letter sent.    Sepideh aB LPN

## 2021-08-18 NOTE — TELEPHONE ENCOUNTER
"-Coronavirus (COVID-19) Notification    Caller Name (Patient, parent, daughter/son, grandparent, etc)  Patient     Reason for call  Notify of Positive Coronavirus (COVID-19) lab results, assess symptoms,  review  [employer] recommendations    Lab Result    Lab test:  2019-nCoV rRt-PCR or SARS-CoV-2 PCR    Oropharyngeal AND/OR nasopharyngeal swabs is POSITIVE for 2019-nCoV RNA/SARS-COV-2 PCR (COVID-19 virus)    RN Recommendations/Instructions per [facility] Coronavirus COVID-19 recommendations    Brief introduction script  Introduce self then review script:  \"I am calling on behalf of [employer].  We were notified that your Coronavirus test (COVID-19) for was POSITIVE for the virus.  I have some information to relay to you but first I wanted to mention that the [place] Dept of Health will be contacting you shortly [it's possible MD already called Patient] to talk to you more about how you are feeling and other people you have had contact with who might now also have the virus.  Also,  [facility] is Partnering with the [facility] for Covid-19 research, you may be contacted directly by research staff.\"    Assessment (Inquire about Patient's current symptoms)   Assessment   Current Symptoms at time of phone call: (if no symptoms, document No symptoms] Congestion    Symptoms onset (if applicable) 8/16/2021     If at time of call, Patients symptoms hare worsened, the Patient should contact 911 or have someone drive them to Emergency Dept promptly:      If Patient calling 911, inform 911 personal that you have tested positive for the Coronavirus (COVID-19).  Place mask on and await 911 to arrive.    If Emergency Dept, If possible, please have another adult drive you to the Emergency Dept but you need to wear mask when in contact with other people.      Monoclonal Antibody Administration    You may be eligible to receive a new treatment with a monoclonal antibody for preventing hospitalization in " "patients at high risk for complications from COVID-19.   This medication is still experimental and available on a limited basis; it is given through an IV and must be given at an infusion center. Please note that not all people who are eligible will receive the medication since it is in limited supply.     Are you interested in being considered for this medication?  No.   Does the patient fit the criteria: Patient declined    If patient qualifies based on above criteria:  \"You will be contacted if you are selected to receive this treatment in the next 1-2 business days.   This is time sensitive and if you are not selected in the next 1-2 business days, you will not receive the medication.  If you do not receive a call to schedule, you have not been selected.\"      Review information with Patient    Your result was positive. This means you have COVID-19 (coronavirus).  We have sent you a letter that reviews the information that I'll be reviewing with you now.    How can I protect others?    If you have symptoms: stay home and away from others (self-isolate) until:    You've had no fever--and no medicine that reduces fever--for 1 full day (24 hours). And       Your other symptoms have gotten better. For example, your cough or breathing has improved. And     At least 10 days have passed since your symptoms started. (If you've been told by a doctor that you have a weak immune system, wait 20 days.)     If you don't have symptoms: Stay home and away from others (self-isolate) until at least 10 days have passed since your first positive COVID-19 test. (Date test collected)    During this time:    Stay in your own room, including for meals. Use your own bathroom if you can.    Stay away from others in your home. No hugging, kissing or shaking hands. No visitors.     Don't go to work, school or anywhere else.     Clean  high touch  surfaces often (doorknobs, counters, handles, etc.). Use a household cleaning spray or wipes. " You'll find a full list on the EPA website at www.epa.gov/pesticide-registration/list-n-disinfectants-use-against-sars-cov-2.     Cover your mouth and nose with a mask, tissue or other face covering to avoid spreading germs.    Wash your hands and face often with soap and water.    Make a list of people you have been in close contact with recently, even if either of you wore a face covering.   ; Start your list from 2 days before you became ill or had a positive test.  ; Include anyone that was within 6 feet of you for a cumulative total of 15 minutes or more in 24 hours. (Example: if you sat next to Gennaro for 5 minutes in the morning and 10 minutes in the afternoon, then you were in close contact for 15 minutes total that day. Gennaro would be added to your list.)    A public health worker will call or text you. It is important that you answer. They will ask you questions about possible exposures to COVID-19, such as people you have been in direct contact with and places you have visited.    Tell the people on your list that you have COVID-19; they should stay away from others for 14 days starting from the last time they were in contact with you (unless you are told something different from a public health worker).     Caregivers in these groups are at risk for severe illness due to COVID-19:  o People 65 years and older  o People who live in a nursing home or long-term care facility  o People with chronic disease (lung, heart, cancer, diabetes, kidney, liver, immunologic)  o People who have a weakened immune system, including those who:  - Are in cancer treatment  - Take medicine that weakens the immune system, such as corticosteroids  - Had a bone marrow or organ transplant  - Have an immune deficiency  - Have poorly controlled HIV or AIDS  - Are obese (body mass index of 40 or higher)  - Smoke regularly    Caregivers should wear gloves while washing dishes, handling laundry and cleaning bedrooms and  bathrooms.    Wash and dry laundry with special caution. Don't shake dirty laundry, and use the warmest water setting you can.    If you have a weakened immune system, ask your doctor about other actions you should take.    For more tips, go to www.cdc.gov/coronavirus/2019-ncov/downloads/10Things.pdf.    You should not go back to work until you meet the guidelines above for ending your home isolation. You don't need to be retested for COVID-19 before going back to work--studies show that you won't spread the virus if it's been at least 10 days since your symptoms started (or 20 days, if you have a weak immune system).    Employers: This document serves as formal notice of your employee's medical guidelines for going back to work. They must meet the above guidelines before going back to work in person.    How can I take care of myself?    1. Get lots of rest. Drink extra fluids (unless a doctor has told you not to).    2. Take Tylenol (acetaminophen) for fever or pain. If you have liver or kidney problems, ask your family doctor if it's okay to take Tylenol.     Take either:     650 mg (two 325 mg pills) every 4 to 6 hours, or     1,000 mg (two 500 mg pills) every 8 hours as needed.     Note: Don't take more than 3,000 mg in one day. Acetaminophen is found in many medicines (both prescribed and over-the-counter medicines). Read all labels to be sure you don't take too much.    For children, check the Tylenol bottle for the right dose (based on their age or weight).    3. If you have other health problems (like cancer, heart failure, an organ transplant or severe kidney disease): Call your specialty clinic if you don't feel better in the next 2 days.    4. Know when to call 911: Emergency warning signs include:    Trouble breathing or shortness of breath    Pain or pressure in the chest that doesn't go away    Feeling confused like you haven't felt before, or not being able to wake up    Bluish-colored lips or  face    5. Sign up for [employer]. We know it's scary to hear that you have COVID-19. We want to track your symptoms to make sure you're okay over the next 2 weeks. Please look for an email from [employer]--this is a free, online program that we'll use to keep in touch. To sign up, follow the link in the email. Learn more at [email or web address].    Where can I get more information?    [facility]: [email or web address]    Coronavirus Basics: www.health.[facility].[place]./diseases/coronavirus/basics.html    What to Do If You're Sick: www.cdc.gov/coronavirus/2019-ncov/about/steps-when-sick.html    Ending Home Isolation: www.cdc.gov/coronavirus/2019-ncov/hcp/disposition-in-home-patients.html     Caring for Someone with COVID-19: www.cdc.gov/coronavirus/2019-ncov/if-you-are-sick/care-for-someone.html     [facility] clinical trials (COVID-19 research studies): clinicalaffairs.[facility]-clinical-trials     A Positive COVID-19 letter will be sent via [employer] or the mail. (Exception, no letters sent to Presurgerical/Preprocedure Patients)    Sepideh Ba LPN

## 2021-09-17 ENCOUNTER — OFFICE VISIT (OUTPATIENT)
Dept: FAMILY MEDICINE | Facility: OTHER | Age: 26
End: 2021-09-17
Attending: FAMILY MEDICINE
Payer: COMMERCIAL

## 2021-09-17 ENCOUNTER — HOSPITAL ENCOUNTER (OUTPATIENT)
Dept: GENERAL RADIOLOGY | Facility: OTHER | Age: 26
End: 2021-09-17
Attending: FAMILY MEDICINE
Payer: COMMERCIAL

## 2021-09-17 VITALS
HEART RATE: 98 BPM | TEMPERATURE: 99 F | WEIGHT: 137.8 LBS | OXYGEN SATURATION: 98 % | SYSTOLIC BLOOD PRESSURE: 102 MMHG | RESPIRATION RATE: 16 BRPM | DIASTOLIC BLOOD PRESSURE: 60 MMHG | BODY MASS INDEX: 22.93 KG/M2

## 2021-09-17 DIAGNOSIS — Z86.16 HISTORY OF COVID-19: ICD-10-CM

## 2021-09-17 DIAGNOSIS — R06.02 SOB (SHORTNESS OF BREATH): ICD-10-CM

## 2021-09-17 DIAGNOSIS — R06.02 SOB (SHORTNESS OF BREATH): Primary | ICD-10-CM

## 2021-09-17 LAB — D DIMER PPP FEU-MCNC: 0.28 UG/ML FEU (ref 0–0.5)

## 2021-09-17 PROCEDURE — 71046 X-RAY EXAM CHEST 2 VIEWS: CPT

## 2021-09-17 PROCEDURE — 85379 FIBRIN DEGRADATION QUANT: CPT | Mod: ZL | Performed by: FAMILY MEDICINE

## 2021-09-17 PROCEDURE — 36415 COLL VENOUS BLD VENIPUNCTURE: CPT | Mod: ZL | Performed by: FAMILY MEDICINE

## 2021-09-17 PROCEDURE — 99214 OFFICE O/P EST MOD 30 MIN: CPT | Performed by: FAMILY MEDICINE

## 2021-09-17 RX ORDER — DEXTROAMPHETAMINE SACCHARATE, AMPHETAMINE ASPARTATE, DEXTROAMPHETAMINE SULFATE AND AMPHETAMINE SULFATE 3.75; 3.75; 3.75; 3.75 MG/1; MG/1; MG/1; MG/1
15 TABLET ORAL DAILY
COMMUNITY
End: 2022-09-12

## 2021-09-17 ASSESSMENT — ENCOUNTER SYMPTOMS
PALPITATIONS: 0
CHOKING: 0
APNEA: 0
SHORTNESS OF BREATH: 1
CHEST TIGHTNESS: 1
COUGH: 0
FATIGUE: 0
STRIDOR: 0
WHEEZING: 0

## 2021-09-17 ASSESSMENT — PAIN SCALES - GENERAL: PAINLEVEL: NO PAIN (0)

## 2021-09-17 NOTE — PROGRESS NOTES
SUBJECTIVE:   Nicky Hart is a 26 year old female who presents to clinic today for the following health issues:  Nursing Notes:   Ade Schofield LPN  9/17/2021 10:59 AM  Sign at exiting of workspace  Patient is here for concerns of pain in her lungs. States she did have covid a month ago, not sure if it is from that. Recently her kids have had a cold so not sure if she is getting one also. She did have asthma as a child.     Patient's last menstrual period was 08/23/2021 (approximate).  Medication Reconciliation: complete    Ade Schofield LPN  9/17/2021 10:59 AM    FOOD SECURITY SCREENING QUESTIONS  Hunger Vital Signs:  Within the past 12 months we worried whether our food would run out before we got money to buy more. Never  Within the past 12 months the food we bought just didn't last and we didn't have money to get more. Never  Ade Schofield LPN 9/17/2021 10:59 AM        HPI  27 yo female presents with ongoing chest discomfort/burnign sensation.    Tested positive for Covid on August 18.  Fairly mild illness and feels that she recovered her baseline.  This discomfort has been present for the past few days.  When she takes a deep breath she has an burning sensation in her chest.  She reports a subjective feeling of shortness of breath.  Although does report that her exercise tolerance seems normal.  She denies cough wheezing hemoptysis lower extremity swelling.  No history of DVT PE.  She is on OCPs.  Patient Active Problem List    Diagnosis Date Noted     Anterior shin splints 02/12/2020     Priority: Medium     Plantar warts 02/12/2020     Priority: Medium     Adjustment disorder with mixed anxiety and depressed mood 02/12/2020     Priority: Medium     Allergic rhinitis due to pollen 02/28/2018     Priority: Medium     Overview:   Seasonal allergy symptoms.       Depression with anxiety 11/19/2013     Priority: Medium     Dysmenorrhea 06/10/2011     Priority: Medium     Idiopathic scoliosis  06/10/2011     Priority: Medium     Past Medical History:   Diagnosis Date     Chronic cough 2002    resolved     Closed fracture of lower end of radius 2006    L arm     History of early menarche 2007     Preeclampsia 2019     Pregnancy          Subacute endomyometritis 2017        Review of Systems   Constitutional: Negative for fatigue.   Respiratory: Positive for chest tightness and shortness of breath. Negative for apnea, cough, choking, wheezing and stridor.    Cardiovascular: Positive for chest pain. Negative for palpitations and peripheral edema.        OBJECTIVE:     /60 (BP Location: Right arm, Patient Position: Sitting, Cuff Size: Adult Regular)   Pulse 98   Temp 99  F (37.2  C) (Tympanic)   Resp 16   Wt 62.5 kg (137 lb 12.8 oz)   LMP 2021 (Approximate)   SpO2 98%   Breastfeeding No   BMI 22.93 kg/m    Body mass index is 22.93 kg/m .  Physical Exam  Cardiovascular:      Rate and Rhythm: Normal rate.      Heart sounds: No murmur heard.     Pulmonary:      Effort: Pulmonary effort is normal. No respiratory distress.      Breath sounds: No wheezing or rales.   Chest:      Chest wall: Tenderness present.   Musculoskeletal:         General: No swelling.      Cervical back: Normal range of motion.   Skin:     General: Skin is warm.   Neurological:      General: No focal deficit present.      Mental Status: She is alert.         Diagnostic Test Results:  Results for orders placed or performed during the hospital encounter of 21   XR Chest 2 Views     Status: None    Narrative    PROCEDURE: XR CHEST 2 VW 2021 11:35 AM    HISTORY: SOB (shortness of breath); History of COVID-19    COMPARISONS: 2021.    TECHNIQUE: 2 views.    FINDINGS: Heart and pulmonary vasculature are normal. Lungs are clear  and no pleural effusion is seen.         Impression    IMPRESSION: No acute infiltrate.    AMENA KRISHNAN MD         SYSTEM ID:  [id number]   Results for orders  placed or performed in visit on 09/17/21   D dimer quantitative     Status: Normal   Result Value Ref Range    D-Dimer Quantitative 0.28 0.00 - 0.50 ug/mL FEU    Narrative    This D-dimer assay is intended for use in conjunction with a clinical pretest probability assessment model to exclude pulmonary embolism (PE) and deep venous thrombosis (DVT) in outpatients suspected of PE or DVT. The cut-off value is 0.50 ug/mL FEU.       ASSESSMENT/PLAN:           ICD-10-CM    1. SOB (shortness of breath)  R06.02 D dimer quantitative     XR Chest 2 Views     D dimer quantitative   2. History of COVID-19  Z86.16 D dimer quantitative     XR Chest 2 Views     D dimer quantitative     With recent COVID-19 infection and combined use of OCPs, recommend ruling no PE.  D-dimer was negative.  Chest x-ray was reassuring.  Suspect she has pleurisy and recommend NSAIDs.  Should her symptoms worsen or do not improve over the next 5 to 7 days, recommend return visit and consideration of CT scan for further evaluation.  She is comfortable with this plan.    Stacey Eugene MD  [facility]

## 2021-09-17 NOTE — NURSING NOTE
Patient is here for concerns of pain in her lungs. States she did have covid a month ago, not sure if it is from that. Recently her kids have had a cold so not sure if she is getting one also. She did have asthma as a child.     Patient's last menstrual period was 08/23/2021 (approximate).  Medication Reconciliation: complete    Ade Schofield LPN  9/17/2021 10:59 AM    FOOD SECURITY SCREENING QUESTIONS  Hunger Vital Signs:  Within the past 12 months we worried whether our food would run out before we got money to buy more. Never  Within the past 12 months the food we bought just didn't last and we didn't have money to get more. Never  Ade Schofield LPN 9/17/2021 10:59 AM

## 2021-10-03 DIAGNOSIS — Z30.011 ENCOUNTER FOR INITIAL PRESCRIPTION OF CONTRACEPTIVE PILLS: ICD-10-CM

## 2021-10-06 RX ORDER — DROSPIRENONE AND ETHINYL ESTRADIOL 0.02-3(28)
KIT ORAL
Qty: 84 TABLET | Refills: 1 | Status: SHIPPED | OUTPATIENT
Start: 2021-10-06 | End: 2022-01-25

## 2021-10-06 NOTE — TELEPHONE ENCOUNTER
[facility]  sent Rx request for the following:     Requested Prescriptions   Pending Prescriptions Disp Refills     VESTURA 3-0.02 MG tablet [Pharmacy Med Name: VESTURA 3 MG-0.02 MG TABLET] 84 tablet 1     Sig: TAKE 1 TABLET BY MOUTH EVERY DAY     Last Prescription Date:   3/17/2021  Last Fill Qty/Refills:         84, R-1    Last Office Visit:              9/17/2021 TYP  Future Office visit:           none    Routing refill request to provider for review/approval because:  For pcp review.     Unable to complete prescription refill per RN Medication Refill Policy.................... Allison Linn RN ....................  10/6/2021   3:10 PM

## 2021-10-12 ENCOUNTER — HOSPITAL ENCOUNTER (OUTPATIENT)
Facility: OTHER | Age: 26
End: 2021-10-12
Attending: OBSTETRICS & GYNECOLOGY | Admitting: OBSTETRICS & GYNECOLOGY
Payer: COMMERCIAL

## 2021-10-12 ENCOUNTER — OFFICE VISIT (OUTPATIENT)
Dept: OBGYN | Facility: OTHER | Age: 26
End: 2021-10-12
Attending: OBSTETRICS & GYNECOLOGY
Payer: COMMERCIAL

## 2021-10-12 VITALS
SYSTOLIC BLOOD PRESSURE: 104 MMHG | WEIGHT: 137 LBS | BODY MASS INDEX: 22.8 KG/M2 | DIASTOLIC BLOOD PRESSURE: 70 MMHG | HEART RATE: 100 BPM

## 2021-10-12 DIAGNOSIS — Z30.09 STERILIZATION CONSULT: Primary | ICD-10-CM

## 2021-10-12 DIAGNOSIS — Z01.812 PRE-PROCEDURE LAB EXAM: ICD-10-CM

## 2021-10-12 DIAGNOSIS — Z30.09 STERILIZATION CONSULT: ICD-10-CM

## 2021-10-12 DIAGNOSIS — N81.11 CYSTOCELE, MIDLINE: ICD-10-CM

## 2021-10-12 PROCEDURE — 99214 OFFICE O/P EST MOD 30 MIN: CPT | Performed by: OBSTETRICS & GYNECOLOGY

## 2021-10-12 RX ORDER — ACETAMINOPHEN 325 MG/1
975 TABLET ORAL ONCE
Status: CANCELLED | OUTPATIENT
Start: 2021-10-12 | End: 2021-10-12

## 2021-10-12 ASSESSMENT — PAIN SCALES - GENERAL: PAINLEVEL: NO PAIN (1)

## 2021-10-12 NOTE — PROGRESS NOTES
Gynecology Visit- Preop H&P    CC: desire for sterilization, cystocele    HPI:    Can Hart is a 26 year old , here for the above concerns. She has been thinking about contraception, specifically sterilization, since her daughter was born. She had two traumatic births and does not want to go through that again. She is currently getting , has thought about the future and possibly meeting someone who would want kids, and she is certain she would not want to be pregnant again. She has tried OCPs but has issues with breakthrough bleeding. She had a Mirena spontaneously expel at the time of placement. It was also painful and she does not want to do that again. She is concerned about bothersome bleeding with Nexplanon as well.     She also has questions about a cystocele and intercourse. Since the birth of her daughter, she feels like she needs to bear down to completely empty when voiding. She had stress incontinence for 6-8 months after her birth but that has now resolved. She also has decreased sensation with intercourse, no pain.     OBHx  OB History    Para Term  AB Living   2 2 2 0 0 2   SAB TAB Ectopic Multiple Live Births   0 0 0 0 2      # Outcome Date GA Lbr Surinder/2nd Weight Sex Delivery Anes PTL Lv   2 Term 19 37w1d / 00:09 2.773 kg (6 lb 1.8 oz) F Vag-Spont EPI  NINA      Complications: Preeclampsia/Hypertension, Third-stage postpartum hemorrhage      Name: MARIA TFEMALE-CAN      Apgar1: 7  Apgar5: 8   1 Term 04/10/17 40w1d  3.311 kg (7 lb 4.8 oz) M Vag-Spont EPI N NINA      Birth Comments:  screen: pendingCCHD: passHearing screen: pass      Name: ROSE RUBY      Apgar1: 4  Apgar5: 6       PMHx:   Past Medical History:   Diagnosis Date     Chronic cough 2002    resolved     Closed fracture of lower end of radius 2006    L arm     History of early menarche 2007     Preeclampsia 2019     Pregnancy          Subacute endomyometritis 2017       PSHx:   Past Surgical History:   Procedure Laterality Date     DILATION AND CURETTAGE N/A 2017    Dr. Hazel; [facility] - retained placenta/endometritis      Meds:   Current Outpatient Medications   Medication     ADDERALL XR 30 MG 24 hr capsule     amphetamine-dextroamphetamine (ADDERALL) 15 MG tablet     eletriptan (RELPAX) 20 MG tablet     EPINEPHrine (EPIPEN/ADRENACLICK/OR ANY BX GENERIC EQUIV) 0.3 MG/0.3ML injection 2-pack     VESTURA 3-0.02 MG tablet     No current facility-administered medications for this visit.       Allergies:       Allergies   Allergen Reactions     Bermuda Grass Itching     Dust Mite Extract Itching     Mold Itching     Pollen Extract Itching     Tree Nuts  [Nuts] Itching       SocHx:   Social History     Tobacco Use     Smoking status: Never Smoker     Smokeless tobacco: Never Used   Vaping Use     Vaping Use: Never used   Substance Use Topics     Alcohol use: Yes     Comment: Alcoholic Drinks/day: rare - 3 times per year     Drug use: No     Comment: Drug use: No     Getting .    FamHx:   Family History   Problem Relation Age of Onset     GERD Mother      GERD Maternal Grandmother      Ulcerative Colitis Maternal Grandfather         ROS: 10-Point ROS negative except as noted in HPI    Physical Exam  /70 (BP Location: Right arm, Patient Position: Sitting, Cuff Size: Adult Regular)   Pulse 100   Wt 62.1 kg (137 lb)   Breastfeeding No   BMI 22.80 kg/m    Gen: Well-appearing, NAD  CV: RRR  Resp: CTAB  Psych: appropriate mood and affect    Pelvic:  Normal appearing external female genitalia. Normal hair distribution. Vagina is without lesions. Small white discharge. Cervix normal, no lesions, no cervical motion tenderness. Uterus is small, mobile, non-tender. No adnexal tenderness or masses. Grade 1 cystocele. No significant uterine prolapse or rectocele    PVR 64 mL    Assessment/Plan  Nicky Hart is a 26 year old  female here for sterilization consult,  with concerns about a cystocele as well. Discussed all reversible forms of contraception as well. Discussed risks of laparoscopic sterilization, including risk of bleeding, infection, injury to surrounding organs, contraception failure. Specifically discussed risk of regret given her young age and relationship factors. She still desires to proceed. preop completed today but will need to update day of surgery. Also discussed her prolapse, which is fairly minimal at this time. Discussed option of pelvic floor physical therapy, to which she was agreeable.     Total time spent 30 minutes     Zaira Moffett MD  OB/GYN  10/12/2021 2:58 PM

## 2021-10-12 NOTE — NURSING NOTE
"Date of Surgery: 11/24/21  Type of Surgery: Bilateral Salpingectomy   Surgeon: Dr. Moon Moffett     appropriate appointments were scheduled by the Unit [facility]  Patient was given surgical folder  which includes pre-operative bathing instructions related to the two packets of Hibiclens surgical prep provided.. Surgical forms were copied and kept for informative purposes. Originals were delivered to Day-surgery. Questions were answered to the best of this nurse's ability.        STOP BANG    Fever/Chills or other infectious symptoms in past month? no  >10 pound weight loss in the past 2 months? no  Health Care Directive on file? no  History of blood transfusions? Yes, 9/2019 no issues  Td up to date? yes  History of VRE/MRSA? no      Obstructive Sleep Apnea screening    Preoperative Evaluation: Obstructive Sleep Apnea screening    S: Snore -  Do you snore loudly? (louder than talking or loud enough to be heard through closed doors) No  T: Tired - Do you often feel tired, fatigued, or sleepy during the daytime?No  O: Observed - Has anyone ever observed you stop breathing during your sleep?No  P: Pressure - Do you have or are you being treated for high blood pressure?No  B: BMI - BMI greater than 35kg/m2?No  A: Age - Age over 50 years old?No  N: Neck - Neck circumference greater than 40 cm?No  G: Gender - Gender: Male?No    Total number of \"YES\" responses:  0    Scoring: Low risk of JULIETA 0-2  At Risk of JULIETA: >3 High Risk of JULIETA: 5-8      Total yes answers in JULIETA section:    Low risk 0-2  At risk 3-4  High risk 5-8    Francie Brewster RN............. 10/12/2021 2:57 PM     "

## 2021-10-12 NOTE — NURSING NOTE
Chief Complaint   Patient presents with     Consult     Tubal        Medication Reconciliation: completed   Ambika Agudelo LPN  10/12/2021 2:20 PM

## 2021-11-17 ENCOUNTER — TELEPHONE (OUTPATIENT)
Dept: OBGYN | Facility: OTHER | Age: 26
End: 2021-11-17
Payer: COMMERCIAL

## 2021-11-17 NOTE — TELEPHONE ENCOUNTER
Patient would like to cancel her surgery on 11/24 with Dr. Moffett.  She does not need a call back to reschedule.  Reasoning was she was to scared and doesn't want to proceed at this time.    Blanche Denise on 11/17/2021 at 4:08 PM

## 2021-11-17 NOTE — TELEPHONE ENCOUNTER
Fax sent with cancel notation to day surgery. Supriya Garcia RN ....................  11/17/2021   4:13 PM

## 2021-12-29 ENCOUNTER — OFFICE VISIT (OUTPATIENT)
Dept: FAMILY MEDICINE | Facility: OTHER | Age: 26
End: 2021-12-29
Attending: PHYSICIAN ASSISTANT
Payer: COMMERCIAL

## 2021-12-29 VITALS
BODY MASS INDEX: 24.3 KG/M2 | SYSTOLIC BLOOD PRESSURE: 112 MMHG | OXYGEN SATURATION: 98 % | WEIGHT: 146 LBS | RESPIRATION RATE: 16 BRPM | HEART RATE: 76 BPM | TEMPERATURE: 98 F | DIASTOLIC BLOOD PRESSURE: 76 MMHG

## 2021-12-29 DIAGNOSIS — Z91.030 BEE STING ALLERGY: ICD-10-CM

## 2021-12-29 DIAGNOSIS — G44.219 EPISODIC TENSION-TYPE HEADACHE, NOT INTRACTABLE: Primary | ICD-10-CM

## 2021-12-29 PROCEDURE — 99213 OFFICE O/P EST LOW 20 MIN: CPT | Performed by: PHYSICIAN ASSISTANT

## 2021-12-29 RX ORDER — EPINEPHRINE 0.3 MG/.3ML
0.3 INJECTION SUBCUTANEOUS
Qty: 2 EACH | Refills: 11 | Status: SHIPPED | OUTPATIENT
Start: 2021-12-29 | End: 2023-09-28

## 2021-12-29 RX ORDER — ELETRIPTAN HYDROBROMIDE 40 MG/1
20 TABLET, FILM COATED ORAL
Qty: 30 TABLET | Refills: 3 | Status: SHIPPED | OUTPATIENT
Start: 2021-12-29 | End: 2021-12-29

## 2021-12-29 RX ORDER — AMITRIPTYLINE HYDROCHLORIDE 10 MG/1
10 TABLET ORAL AT BEDTIME
Qty: 90 TABLET | Refills: 1 | Status: SHIPPED | OUTPATIENT
Start: 2021-12-29 | End: 2022-05-05

## 2021-12-29 RX ORDER — ELETRIPTAN HYDROBROMIDE 40 MG/1
40 TABLET, FILM COATED ORAL
Qty: 30 TABLET | Refills: 3 | Status: SHIPPED | OUTPATIENT
Start: 2021-12-29 | End: 2023-01-26

## 2021-12-29 ASSESSMENT — PAIN SCALES - GENERAL: PAINLEVEL: MILD PAIN (3)

## 2021-12-29 NOTE — PROGRESS NOTES
Nursing Notes:   Mariah Llamas CMA  2021  9:30 AM  Signed  Chief Complaint   Patient presents with     Headache     Daily headaches     Medication Reconciliation: complete    Mariah Llamas CMA ([facility])      HPI:    Nicky Hart is a 26 year old female who presents for daily headaches.  Patient has history of chronic migraines since childhood.  Has tried Topamax in the past for daily prevention however she ended up losing weight with the medication and had feet tingling sensation.  Currently uses eletriptan for acute migraine relief however she has to take this medication frequently.  Typically takes 1/day.  Works after approximately 3 to 4 hours.  Takes medication with ibuprofen.  History of tried Imitrex in the past however this did not help.  Patient is debating whether she should get a head MRI to rule out concerns with the recurrent migraines.  No problems walking or talking.  No memory concerns.  Eating and drinking well.  No chest pain, palpitations, problems breathing, GI or urinary symptoms.  No neurologic symptoms.  Patient has a lot of tension in her neck.     Past Medical History:   Diagnosis Date     Chronic cough 2002    resolved     Closed fracture of lower end of radius 2006    L arm     History of early menarche 2007     Preeclampsia 2019     Pregnancy          Subacute endomyometritis 2017       Past Surgical History:   Procedure Laterality Date     DILATION AND CURETTAGE N/A 2017    Dr. Hazel; [facility] - retained placenta/endometritis       Family History   Problem Relation Age of Onset     GERD Mother      GERD Maternal Grandmother      Ulcerative Colitis Maternal Grandfather        Social History     Tobacco Use     Smoking status: Never Smoker     Smokeless tobacco: Never Used   Substance Use Topics     Alcohol use: Yes     Comment: rare - 3 times per year       Current Outpatient Medications   Medication Sig Dispense Refill     ADDERALL XR 20 MG 24  hr capsule Take 1 capsule by mouth every morning        amitriptyline (ELAVIL) 10 MG tablet Take 1 tablet (10 mg) by mouth At Bedtime 90 tablet 1     amphetamine-dextroamphetamine (ADDERALL) 15 MG tablet Take 15 mg by mouth daily        eletriptan (RELPAX) 40 MG tablet Take 1 tablet (40 mg) by mouth at onset of headache for migraine May repeat in 2 hours. Max 2 tablets/24 hours.  Updated prescription instructions 30 tablet 3     EPINEPHrine (ANY BX GENERIC EQUIV) 0.3 MG/0.3ML injection 2-pack Inject 0.3 mLs (0.3 mg) into the muscle once as needed for anaphylaxis (Repeat in 5-15 minutes if needed) for allergic reaction for up to 1 dose. 2 each 11     VESTURA 3-0.02 MG tablet TAKE 1 TABLET BY MOUTH EVERY DAY 84 tablet 1       Allergies   Allergen Reactions     Bermuda Grass Itching     Dust Mite Extract Itching     Mold Itching     Pollen Extract Itching     Tree Nuts  [Nuts] Itching       REVIEW OF SYSTEMS:  Refer to HPI.    EXAM:   Vitals:    /76   Pulse 76   Temp 98  F (36.7  C) (Tympanic)   Resp 16   Wt 66.2 kg (146 lb)   LMP 12/20/2021 (Approximate)   SpO2 98%   BMI 24.30 kg/m      General Appearance: Pleasant, alert, appropriate appearance for age. No acute distress  SKIN: Normal, no rashes noted.  Head Exam: Normal. Normocephalic,atraumatic.  Eye Exam:  No scleral icterus. No conjuntival erythema.  Normal external eye, conjunctiva, lids, cornea. ANASTASIA. No foreign body noted in eye or beneath eyelids. No periorbital cellulitis or swelling. The corneas are clear. No drainage or pustule.  EOMI with no nystagmus noted.  Ear Exam: Normal TM's bilaterally, grey, translucent, bony landmarks appreciated.   Left/Right TM: Effusion is not present. TM is not bulging. There is no pus appreciated.  There is no hemotympanum.   Normal auditory canals and external ears. Non-tender.   Nose Exam: Normal external nose.  OroPharynx Exam:  Non-erythematous posterior pharynx with no exudates.    Neck: No throat masses or  thyroid enlargement.   NECK:  There is no spinous process tenderness.  There is no paraspinous tenderness.    LUNGS: Normal chest wall and respirations. Clear to auscultation. No retractions appreciated.  CV: There is a regular rate and rhythm with normal S1 and S2, without murmur.   Abdomen: soft, bowel sounds regular, no masses or organomegaly.  MUSCULOSKELETAL: Full ROM of UEs and LEs.  Motor function is also normal at those levels. Strong peripheral pulses and normal capillary refill of the nailbeds noted. Skin is normal in appearance as well.   NEURO: The cranial nerves II-XII are intact and symmetric. DTR's are normal and symmetric in the upper and lower extremities.  Rhomberg is negative.  There are no abnormal cerebellar signs. Negative nose to finger exam.     PHQ Depression Screen  PHQ-9 SCORE 9/25/2020 3/17/2021 4/20/2021   PHQ-9 Total Score 2 1 3       ASSESSMENT AND PLAN:      ICD-10-CM    1. Episodic tension-type headache, not intractable  G44.219 amitriptyline (ELAVIL) 10 MG tablet     Physical Therapy Referral     eletriptan (RELPAX) 40 MG tablet     DISCONTINUED: eletriptan (RELPAX) 40 MG tablet   2. Bee sting allergy  Z91.030 EPINEPHrine (ANY BX GENERIC EQUIV) 0.3 MG/0.3ML injection 2-pack         Patient was started on daily amitriptyline for migraine prevention.  Gave side effect profile.  Recheck in 1 to 2 months for monitoring.  Referred to physical therapy for consult.  Refill eletriptan.  Increased dose to 40 mg at the start of the migraine.  Maximum of 80 mg in 1 day.  Can take Tylenol ibuprofen as needed for symptomatic relief.    Bee sting allergy: Refilled EpiPen's.    Nany Valadez PA-C ..................12/29/2021 9:28 AM

## 2021-12-29 NOTE — NURSING NOTE
Chief Complaint   Patient presents with     Headache     Daily headaches     Medication Reconciliation: complete    Mariah Llamas CMA ([facility])

## 2022-01-25 ENCOUNTER — TELEPHONE (OUTPATIENT)
Dept: FAMILY MEDICINE | Facility: OTHER | Age: 27
End: 2022-01-25
Payer: COMMERCIAL

## 2022-01-25 DIAGNOSIS — Z30.011 ENCOUNTER FOR INITIAL PRESCRIPTION OF CONTRACEPTIVE PILLS: ICD-10-CM

## 2022-01-25 DIAGNOSIS — B37.31 YEAST VAGINITIS: Primary | ICD-10-CM

## 2022-01-25 RX ORDER — DROSPIRENONE AND ETHINYL ESTRADIOL 0.02-3(28)
1 KIT ORAL DAILY
Qty: 84 TABLET | Refills: 4 | Status: SHIPPED | OUTPATIENT
Start: 2022-01-25 | End: 2022-01-25

## 2022-01-25 RX ORDER — FLUCONAZOLE 150 MG/1
150 TABLET ORAL ONCE
Qty: 2 TABLET | Refills: 0 | Status: SHIPPED | OUTPATIENT
Start: 2022-01-25 | End: 2022-01-25

## 2022-01-25 RX ORDER — DROSPIRENONE AND ETHINYL ESTRADIOL 0.02-3(28)
1 KIT ORAL DAILY
Qty: 84 TABLET | Refills: 4 | Status: SHIPPED | OUTPATIENT
Start: 2022-01-25 | End: 2022-09-12

## 2022-01-25 NOTE — TELEPHONE ENCOUNTER
Patient notified is needing to have scripts sent to [facility] as [facility] does not take her insurance.   Ade Schofield LPN .............1/25/2022     3:54 PM

## 2022-03-23 ENCOUNTER — OFFICE VISIT (OUTPATIENT)
Dept: FAMILY MEDICINE | Facility: OTHER | Age: 27
End: 2022-03-23
Attending: PHYSICIAN ASSISTANT
Payer: COMMERCIAL

## 2022-03-23 VITALS
DIASTOLIC BLOOD PRESSURE: 68 MMHG | OXYGEN SATURATION: 97 % | RESPIRATION RATE: 12 BRPM | BODY MASS INDEX: 25.48 KG/M2 | TEMPERATURE: 97.1 F | WEIGHT: 153.1 LBS | HEART RATE: 104 BPM | SYSTOLIC BLOOD PRESSURE: 106 MMHG

## 2022-03-23 DIAGNOSIS — H65.91 OME (OTITIS MEDIA WITH EFFUSION), RIGHT: Primary | ICD-10-CM

## 2022-03-23 DIAGNOSIS — H60.391 INFECTIVE OTITIS EXTERNA, RIGHT: ICD-10-CM

## 2022-03-23 PROCEDURE — 99213 OFFICE O/P EST LOW 20 MIN: CPT | Performed by: PHYSICIAN ASSISTANT

## 2022-03-23 RX ORDER — OFLOXACIN 3 MG/ML
5 SOLUTION AURICULAR (OTIC) 2 TIMES DAILY
Qty: 4 ML | Refills: 0 | Status: SHIPPED | OUTPATIENT
Start: 2022-03-23 | End: 2022-03-30

## 2022-03-23 ASSESSMENT — PAIN SCALES - GENERAL: PAINLEVEL: MODERATE PAIN (4)

## 2022-03-23 NOTE — LETTER
[facility]  [address]  [place] [address]  Phone: [phone]  Fax: [phone]    March 23, 2022        Nicky Hart  [address]  [place] [address]          To whom it may concern:    RE: Nicky Hart    Patient was seen and treated today at our clinic and missed work. OK to return to work once fever free for 24 hours without fever reducers (Tylenol and/or Ibuprofen)    Please contact me for questions or concerns.      Sincerely,        Madalyn Hall PA-C

## 2022-03-23 NOTE — PATIENT INSTRUCTIONS
"You were prescribed an antibiotic, please take into consideration the following information:  - Take entire course of antibiotic even if you start to feel better.  - Antibiotics can cause stomach upset including nausea and diarrhea. Read your bottle or ask the pharmacist if antibiotic can be taken with food to help prevent nausea. If you have symptoms of diarrhea you can take an over-the-counter probiotic and/or increase foods with probiotics such as yogurt, [place], sauerkraut.  -Use caution in sunlight as can lead to increased risk of sunburn while on ABX (antibiotics).     Please refer to your AVS for follow up and pain/symptoms management recommendations (I.e.: medications, helpful conservative treatment modalities, appropriate follow up if need to a specialist or family practice, etc.). Please return to urgent care if your symptoms change or worsen.     Discharge instructions:  -If you were prescribed a medication(s), please take this as prescribed/directed  -Monitor your symptoms, if changing/worsening, return to UC/ER or PCP for follow up    - For ear infection. Take entire course of antibiotics to ensure this clears (even if feeling better).  - Tylenol or ibuprofen for pain and fevers.   - Eat yogurt, kefir or take over-the-counter \"probiotic\" at least 2 hours before or after a dose of antibiotic. This will replace good bacteria that may have been lost due to the antibiotic. (This may also help to prevent yeast infections and upset stomach during the course of antibiotic.)  - In the future at onset of congestion: Blow nose or use bulb syringe to keep nasal congestion cleared and use saline nasal spray/flush.  -Alternative ibuprofen and tylenol as needed.   -Rest/relaxation and keeping hydrated with clear liquids (ie: water or gatorade). Using a humidifier may be beneficial as well.     * Recheck with family practice as needed or ER sooner with worsening or concerns.     "

## 2022-03-23 NOTE — NURSING NOTE
"Chief Complaint   Patient presents with     Otalgia     right     Patient stated her ear has been hurting for about 3 days.    Initial /68 (BP Location: Right arm, Patient Position: Sitting, Cuff Size: Adult Regular)   Pulse 104   Temp 97.1  F (36.2  C) (Tympanic)   Resp 12   Wt 69.4 kg (153 lb 1.6 oz)   SpO2 97%   Breastfeeding No   BMI 25.48 kg/m   Estimated body mass index is 25.48 kg/m  as calculated from the following:    Height as of 8/9/21: 1.651 m (5' 5\").    Weight as of this encounter: 69.4 kg (153 lb 1.6 oz).  Medication Reconciliation: Completed     Advanced Care Directive Reviewed    Jason Napoles LPN  "

## 2022-03-23 NOTE — PROGRESS NOTES
ASSESSMENT/PLAN:    I have reviewed the nursing notes.  I have reviewed the findings, diagnosis, plan and need for follow up with the patient.    1. OME (otitis media with effusion), right  - amoxicillin-clavulanate (AUGMENTIN) 875-125 MG tablet; Take 1 tablet by mouth 2 times daily for 7 days  Dispense: 14 tablet; Refill: 0  - ofloxacin (FLOXIN) 0.3 % otic solution; Place 5 drops into the right ear 2 times daily for 7 days  Dispense: 4 mL; Refill: 0  - Vital signs stable. PE consistent with OME/ear infection of right ears. Treatment of choice includes antibiotic regimen (Azithromycin, alternating tylenol and ibuprofen every 4-6 hours as needed (if able, daily limits reviewed in AVS and verbally with patient), warm compresses, other symptomatic remedies. Avoid trauma to ear(s) such as Q-tips. If symptoms change or worsen, recommend follow up for reevaluation (high fevers, worsening pain, abnormal drainage or odor from ear, etc.). Discussed if ear infections become increasingly common, as this point, a referral to ENT can be made, typically by PCP. Patient is in agreement and understanding of the above treatment plan. All questions and concerns were addressed and answered to patient's satisfaction. AVS reviewed with patient.     2. Infective otitis externa, right  - ofloxacin (FLOXIN) 0.3 % otic solution; Place 5 drops into the right ear 2 times daily for 7 days  Dispense: 4 mL; Refill: 0  - Ear drops - Be sure to finish all the medicine, even if you feel better after a few days. When you use ear drops, you should:   Lie on your side or tilt your head, with the affected ear facing up   Make sure the ear drops go into the ear canal   Stay in this position for 20 minutes (after the ear drops are put in)     -Ibuprofen (with food) or acetaminophen as needed for pain  -You should not swim for 7 to 10 days after starting treatment. You can take a shower, but make sure to keep the ear dry. You can put some petroleum jelly  (sample brand name: Vaseline) on a cotton ball, and then put the cotton ball in your outer ear, covering the opening of your ear canal. Do not push the cotton ball into the ear canal.  -You should also avoid wearing hearing aids or headphones, or putting anything into the infected ear, until your symptoms improve.  -Avoid putting cotton swabs, fingers, or other things inside the ear   -Follow with primary provider if symptoms worsen or persist.      Discussed warning signs/symptoms indicative of need to f/u    Follow up if symptoms persist or worsen or concerns    I explained my diagnostic considerations and recommendations to the patient, who voiced understanding and agreement with the treatment plan. All questions were answered. We discussed potential side effects of any prescribed or recommended therapies, as well as expectations for response to treatments.    Madalyn Hall PA-C  3/23/2022  10:58 AM    HPI:    Nicky Hart is a 26 year old female  who presents to [facility] today for concerns of URI, x 3 days    Symptoms:  Yes fevers or chills. Fever, highest reported temperature: low grade  No sore throat/pharyngitis/tonsillitis.   Yes allergy/URI Symptoms  Yes Muffled Sounds/Change in Hearing  Yes Sensation of Fullness in Ear(s)  No Ringing in Ears/Tinnitus  No Balance Changes  No Dizziness  Yes Congestion (head/nasal/chest)  No Cough/Productive Cough  Yes Post Nasal Drip - color: clear  Yes Headache  No Sinus Pain/Pressure  No Myalgias  Yes Otalgia - Right  Activity Level Changes: No  Appetite/Liquid Intake Changes: No  Changes to Bowel Habits: No  Changes to Bladder Habits: No  Additional Symptoms to Report: No  History of similar symptoms: No  Prior workup: No    Treatments tried: Tylenol/Ibuprofen    Site of exposure: home  Type of exposure: not known    Vaccination status:   - Influenza: not immunized at this time per [facility]  - COVID: 2/25/21, 3/25/21    Allergies UTD in chart    PCP: Charissa  DO    Past Medical History:   Diagnosis Date     Chronic cough 2002    resolved     Closed fracture of lower end of radius 2006    L arm     History of early menarche 2007     Preeclampsia 2019     Pregnancy          Subacute endomyometritis 2017     Past Surgical History:   Procedure Laterality Date     DILATION AND CURETTAGE N/A 2017    Dr. Hazel; [facility] - retained placenta/endometritis     Social History     Tobacco Use     Smoking status: Never Smoker     Smokeless tobacco: Never Used   Substance Use Topics     Alcohol use: Yes     Comment: rare - 3 times per year     Current Outpatient Medications   Medication Sig Dispense Refill     ADDERALL XR 20 MG 24 hr capsule Take 1 capsule by mouth every morning        amitriptyline (ELAVIL) 10 MG tablet Take 1 tablet (10 mg) by mouth At Bedtime 90 tablet 1     amphetamine-dextroamphetamine (ADDERALL) 15 MG tablet Take 15 mg by mouth daily        drospirenone-ethinyl estradiol (VESTURA) 3-0.02 MG tablet Take 1 tablet by mouth daily 84 tablet 4     eletriptan (RELPAX) 40 MG tablet Take 1 tablet (40 mg) by mouth at onset of headache for migraine May repeat in 2 hours. Max 2 tablets/24 hours.  Updated prescription instructions 30 tablet 3     EPINEPHrine (ANY BX GENERIC EQUIV) 0.3 MG/0.3ML injection 2-pack Inject 0.3 mLs (0.3 mg) into the muscle once as needed for anaphylaxis (Repeat in 5-15 minutes if needed) for allergic reaction for up to 1 dose. 2 each 11     Allergies   Allergen Reactions     Bermuda Grass Itching     Dust Mite Extract Itching     Mold Itching     Pollen Extract Itching     Tree Nuts  [Nuts] Itching     Past medical history, past surgical history, current medications and allergies reviewed and accurate to the best of my knowledge.      ROS:  Refer to HPI    /68 (BP Location: Right arm, Patient Position: Sitting, Cuff Size: Adult Regular)   Pulse 104   Temp 97.1  F (36.2  C) (Tympanic)   Resp 12   Wt 69.4 kg (153  lb 1.6 oz)   SpO2 97%   Breastfeeding No   BMI 25.48 kg/m      EXAM:  General Appearance: Well appearing 26 year old female, appropriate appearance for age. No acute distress   Ears: Left TM intact, translucent with bony landmarks appreciated, no erythema, no effusion, no bulging, no purulence.  Right TM intact, erythematous, bulging, dull effusion.  Left auditory canal clear.  Erythema, inflammation, mild narrowing of right auditory canal.  Normal external ears, non tender.  Eyes: conjunctivae normal without erythema or irritation, corneas clear, no drainage or crusting, no eyelid swelling, pupils equal   Orophayrnx: moist mucous membranes, posterior pharynx without erythema, tonsils symmetric, no erythema, no exudates or petechiae, no post nasal drip seen, no trismus, voice clear.    Sinuses:  No sinus tenderness upon palpation of the frontal or maxillary sinuses  Nose:  Bilateral nares: no erythema, no edema, no drainage or congestion   Neck: supple without adenopathy  Respiratory: normal chest wall and respirations.  Normal effort.  Clear to auscultation bilaterally, no wheezing, crackles or rhonchi.  No increased work of breathing.  No cough appreciated.  Cardiac: RRR with no murmurs  Dermatological: no rashes noted of exposed skin  Psychological: normal affect, alert, oriented, and pleasant.     Labs:  None     Xray:  None

## 2022-04-04 ENCOUNTER — OFFICE VISIT (OUTPATIENT)
Dept: FAMILY MEDICINE | Facility: OTHER | Age: 27
End: 2022-04-04
Attending: PHYSICIAN ASSISTANT
Payer: COMMERCIAL

## 2022-04-04 VITALS
DIASTOLIC BLOOD PRESSURE: 84 MMHG | BODY MASS INDEX: 25.46 KG/M2 | RESPIRATION RATE: 16 BRPM | WEIGHT: 153 LBS | SYSTOLIC BLOOD PRESSURE: 122 MMHG | HEART RATE: 92 BPM | TEMPERATURE: 98.7 F | OXYGEN SATURATION: 99 %

## 2022-04-04 DIAGNOSIS — H92.03 OTALGIA OF BOTH EARS: ICD-10-CM

## 2022-04-04 DIAGNOSIS — J20.9 ACUTE BRONCHITIS, UNSPECIFIED ORGANISM: Primary | ICD-10-CM

## 2022-04-04 DIAGNOSIS — Z86.69 OTITIS MEDIA RESOLVED: ICD-10-CM

## 2022-04-04 PROCEDURE — 99213 OFFICE O/P EST LOW 20 MIN: CPT | Performed by: NURSE PRACTITIONER

## 2022-04-04 RX ORDER — AZITHROMYCIN 250 MG/1
TABLET, FILM COATED ORAL
Qty: 6 TABLET | Refills: 0 | Status: SHIPPED | OUTPATIENT
Start: 2022-04-04 | End: 2022-04-09

## 2022-04-04 ASSESSMENT — PAIN SCALES - GENERAL: PAINLEVEL: MILD PAIN (2)

## 2022-04-04 NOTE — PROGRESS NOTES
ASSESSMENT/PLAN:     I have reviewed the nursing notes.  I have reviewed the findings, diagnosis, plan and need for follow up with the patient.    1. Acute bronchitis, unspecified organism    - azithromycin (ZITHROMAX) 250 MG tablet; Take 2 tablets (500 mg) by mouth daily for 1 day, THEN 1 tablet (250 mg) daily for 4 days.  Dispense: 6 tablet; Refill: 0    Symptomatic treatment - Encouraged fluids, salt water gargles, honey, elevation, humidifier, saline nasal spray, sinus rinse/netti pot, lozenges, tea, soup, smoothies, popsicles, topical vapor rub, rest, etc     May continue Mucinex PRN.  May use OTC cough or cold medication PRN.    Discussed warning signs/symptoms indicative of need to f/u  Follow up if symptoms persist or worsen or concerns      2. Otalgia of both ears  3. Otitis media resolved    Continue Tylenol and Ibuprofen PRN  May use OTC ear pain drops PRN          I explained my diagnostic considerations and recommendations to the patient, who voiced understanding and agreement with the treatment plan. All questions were answered. We discussed potential side effects of any prescribed or recommended therapies, as well as expectations for response to treatments.    Radha Hodge NP  [facility]      SUBJECTIVE:   Nicky Hart is a 26 year old female who presents to clinic today for the following health issues:  Cough and ear pain    HPI  Persistent bilateral ear pain.  Treated with Augmentin x 7 days for AOM 3/23/22.  States she was only able to take 2 or 3 days of the antibiotics due to severe diarrhea.  Ears with   Cough for the past week.  Coughing up green chunks of phlegm.  Chest discomfort with coughing and deep breathing.  Mild chest tightness.  No chest heaviness.  Mild shortness of breath with coughing and activity, none at rest.  Headaches during coughing.  No fevers, highest temp of 99.9.  Intermittent sweats.  Appetite normal.  Diarrhea x 3 days, resolved after  stopping antibiotics.  No nausea or vomiting.    Clear nasal drainage.  Upper throat discomfort.    Tried Albuterol nebulizer without relief.  Taking tylenol and ibuprofen for ear pain and headache        Past Medical History:   Diagnosis Date     Chronic cough 2002    resolved     Closed fracture of lower end of radius 2006    L arm     History of early menarche 2007     Preeclampsia 2019     Pregnancy          Subacute endomyometritis 2017     Past Surgical History:   Procedure Laterality Date     DILATION AND CURETTAGE N/A 2017    Dr. Hazel; [facility] - retained placenta/endometritis     Social History     Tobacco Use     Smoking status: Never Smoker     Smokeless tobacco: Never Used   Substance Use Topics     Alcohol use: Yes     Comment: rare - 3 times per year     Current Outpatient Medications   Medication Sig Dispense Refill     ADDERALL XR 20 MG 24 hr capsule Take 1 capsule by mouth every morning        amitriptyline (ELAVIL) 10 MG tablet Take 1 tablet (10 mg) by mouth At Bedtime 90 tablet 1     amphetamine-dextroamphetamine (ADDERALL) 15 MG tablet Take 15 mg by mouth daily        drospirenone-ethinyl estradiol (VESTURA) 3-0.02 MG tablet Take 1 tablet by mouth daily 84 tablet 4     eletriptan (RELPAX) 40 MG tablet Take 1 tablet (40 mg) by mouth at onset of headache for migraine May repeat in 2 hours. Max 2 tablets/24 hours.  Updated prescription instructions 30 tablet 3     EPINEPHrine (ANY BX GENERIC EQUIV) 0.3 MG/0.3ML injection 2-pack Inject 0.3 mLs (0.3 mg) into the muscle once as needed for anaphylaxis (Repeat in 5-15 minutes if needed) for allergic reaction for up to 1 dose. 2 each 11     Allergies   Allergen Reactions     Bermuda Grass Itching     Dust Mite Extract Itching     Mold Itching     Pollen Extract Itching     Tree Nuts  [Nuts] Itching         Past medical history, past surgical history, current medications and allergies reviewed and accurate to the best of my  knowledge.        OBJECTIVE:     /84   Pulse 92   Temp 98.7  F (37.1  C) (Tympanic)   Resp 16   Wt 69.4 kg (153 lb)   SpO2 99%   BMI 25.46 kg/m    Body mass index is 25.46 kg/m .     Physical Exam  General Appearance: Miserable appearing adult female, non toxic, appropriate appearance for age. No acute distress  Ears: Left TM intact with bony landmarks appreciated, no erythema, serous effusion, mild bulging, no purulence.  Right TM intact with bony landmarks appreciated, no erythema, serous effusion, mild bulging, no purulence.  Left auditory canal clear without drainage or bleeding.  Right auditory canal clear without drainage or bleeding.  Normal external ears, non tender.  Eyes: conjunctivae normal without erythema or irritation, corneas clear, no drainage or crusting, no eyelid swelling, pupils equal   Orophayrnx: moist mucous membranes, pharynx with mild erythema, tonsils without hypertrophy, tonsils without erythema, no tonsillar exudates, no oral lesions, no palate petechiae, no post nasal drip seen, no trismus, voice clear.    Sinuses:  No sinus tenderness upon palpation of the frontal or maxillary sinuses  Nose:  Mild clear active drainage   Neck: supple without adenopathy  Respiratory: normal chest wall and respirations.  Normal effort.  Clear to auscultation bilaterally, no wheezing, crackles or rhonchi.  No increased work of breathing. Occasional mild bronchial cough appreciated.  Cardiac: RRR with no murmurs  Musculoskeletal:  Equal movement of bilateral upper extremities.  Equal movement of bilateral lower extremities.  Normal gait.    Psychological: normal affect, alert, oriented, and pleasant.

## 2022-05-05 ENCOUNTER — OFFICE VISIT (OUTPATIENT)
Dept: FAMILY MEDICINE | Facility: OTHER | Age: 27
End: 2022-05-05
Attending: NURSE PRACTITIONER
Payer: COMMERCIAL

## 2022-05-05 VITALS
HEART RATE: 72 BPM | OXYGEN SATURATION: 97 % | SYSTOLIC BLOOD PRESSURE: 104 MMHG | TEMPERATURE: 97.6 F | BODY MASS INDEX: 25.33 KG/M2 | WEIGHT: 152 LBS | HEIGHT: 65 IN | RESPIRATION RATE: 16 BRPM | DIASTOLIC BLOOD PRESSURE: 68 MMHG

## 2022-05-05 DIAGNOSIS — Z30.017 NEXPLANON INSERTION: Primary | ICD-10-CM

## 2022-05-05 DIAGNOSIS — Z97.5 NEXPLANON IN PLACE: ICD-10-CM

## 2022-05-05 LAB — HCG UR QL: NEGATIVE

## 2022-05-05 PROCEDURE — 250N000011 HC RX IP 250 OP 636: Performed by: NURSE PRACTITIONER

## 2022-05-05 PROCEDURE — 11981 INSERTION DRUG DLVR IMPLANT: CPT | Performed by: NURSE PRACTITIONER

## 2022-05-05 PROCEDURE — 81025 URINE PREGNANCY TEST: CPT | Mod: ZL | Performed by: NURSE PRACTITIONER

## 2022-05-05 RX ADMIN — ETONOGESTREL 68 MG: 68 IMPLANT SUBCUTANEOUS at 09:51

## 2022-05-05 ASSESSMENT — PAIN SCALES - GENERAL: PAINLEVEL: NO PAIN (0)

## 2022-05-05 NOTE — PROGRESS NOTES
"CC: 27 year old Female who presents for nexplanon placement    HPI Comments: Patient presents for Nexplanon  insertion/placement. Discussed options. Discussed risks and SEs previously. Consent form reviewed, questions answered, and signed by patient and myself.     REVIEW OF SYSTEMS:  ROS see HPI otherwise negative    OBJECTIVE:  /68   Pulse 72   Temp 97.6  F (36.4  C)   Resp 16   Ht 1.651 m (5' 5\")   Wt 68.9 kg (152 lb)   SpO2 97%   BMI 25.29 kg/m      EXAM:   Physical Exam   Constitutional: She is oriented to person, place, and time and well-developed, well-nourished, and in no distress.   Eyes: Conjunctivae are normal.   Cardiovascular: Normal rate.    Pulmonary/Chest: Effort normal.   Neurological: She is alert and oriented to person, place, and time.   Skin: No rash noted.   Psychiatric: Mood and affect normal.     L arm is  positioned. Landmarks marked. Anesthesized with 2ccs of lido with epi. Area is cleansed with chloroprep. Nexplanon obturator is introduced without difficulty and Nexplanon is placed according to the manufacturers directions. The Nexplanon lissette is palpated by myself and the patient after the procedure. Steri strip placed as well as a compression dressing.  Pt. tolerated procedure fine.     Results for orders placed or performed in visit on 05/05/22   Pregnancy, Urine (HCG)     Status: Normal   Result Value Ref Range    hCG Urine Qualitative Negative Negative       ASSESSMENT/PLAN:  1. Nexplanon insertion    2. Nexplanon in place        Plan:  Remove compression dressing in 2-3 hours. Limited lifting in the next 24 hours. Follow up as needed with questions and concerns. Discussed options for treating potential irregular bleeding on Nexplanon.       MIGDALIA Tan CNP       "

## 2022-05-05 NOTE — NURSING NOTE
Patient presents today for nexplanon insertion.    Medication Reconciliation Complete    Ai Barth LPN  5/5/2022 9:10 AM

## 2022-08-20 ENCOUNTER — OFFICE VISIT (OUTPATIENT)
Dept: FAMILY MEDICINE | Facility: OTHER | Age: 27
End: 2022-08-20
Attending: PHYSICIAN ASSISTANT
Payer: COMMERCIAL

## 2022-08-20 VITALS
HEART RATE: 78 BPM | TEMPERATURE: 97.4 F | WEIGHT: 165.4 LBS | SYSTOLIC BLOOD PRESSURE: 98 MMHG | RESPIRATION RATE: 16 BRPM | DIASTOLIC BLOOD PRESSURE: 64 MMHG | OXYGEN SATURATION: 98 % | BODY MASS INDEX: 27.52 KG/M2

## 2022-08-20 DIAGNOSIS — R30.0 DYSURIA: Primary | ICD-10-CM

## 2022-08-20 DIAGNOSIS — R10.2 PELVIC PAIN IN FEMALE: ICD-10-CM

## 2022-08-20 LAB
ALBUMIN UR-MCNC: NEGATIVE MG/DL
APPEARANCE UR: CLEAR
BILIRUB UR QL STRIP: NEGATIVE
COLOR UR AUTO: ABNORMAL
GLUCOSE UR STRIP-MCNC: NEGATIVE MG/DL
HCG UR QL: NEGATIVE
HGB UR QL STRIP: ABNORMAL
KETONES UR STRIP-MCNC: NEGATIVE MG/DL
LEUKOCYTE ESTERASE UR QL STRIP: NEGATIVE
NITRATE UR QL: NEGATIVE
PH UR STRIP: 7 [PH] (ref 5–9)
RBC URINE: 2 /HPF
SP GR UR STRIP: 1.02 (ref 1–1.03)
UROBILINOGEN UR STRIP-MCNC: NORMAL MG/DL
WBC URINE: <1 /HPF

## 2022-08-20 PROCEDURE — 81025 URINE PREGNANCY TEST: CPT | Mod: ZL | Performed by: FAMILY MEDICINE

## 2022-08-20 PROCEDURE — 99213 OFFICE O/P EST LOW 20 MIN: CPT | Performed by: FAMILY MEDICINE

## 2022-08-20 PROCEDURE — 81001 URINALYSIS AUTO W/SCOPE: CPT | Mod: ZL | Performed by: FAMILY MEDICINE

## 2022-08-20 ASSESSMENT — PAIN SCALES - GENERAL: PAINLEVEL: NO PAIN (0)

## 2022-08-20 NOTE — NURSING NOTE
"Chief Complaint   Patient presents with     Bladder Problems     Abdominal pain when urinating   started 3 days ago       Medication reconciliation completed.    FOOD SECURITY SCREENING QUESTIONS:    The next two questions are to help us understand your food security.  If you are feeling you need any assistance in this area, we have resources available to support you today.    Hunger Vital Signs:  Within the past 12 months we worried whether our food would run out before we got money to buy more. Never  Within the past 12 months the food we bought just didn't last and we didn't have money to get more. Never    Initial BP 98/64 (BP Location: Right arm, Patient Position: Sitting, Cuff Size: Adult Regular)   Pulse 78   Temp 97.4  F (36.3  C) (Temporal)   Resp 16   Wt 75 kg (165 lb 6.4 oz)   SpO2 98%   Breastfeeding No   BMI 27.52 kg/m   Estimated body mass index is 27.52 kg/m  as calculated from the following:    Height as of 5/5/22: 1.651 m (5' 5\").    Weight as of this encounter: 75 kg (165 lb 6.4 oz).       Yolie Ross LPN .......  8/20/2022  11:22 AM  "

## 2022-08-20 NOTE — PROGRESS NOTES
SUBJECTIVE:   Nicky Hart is a 27 year old female who presents to clinic today for the following health issues: Abdominal discomfort    Patient arrives here for abdominal discomfort.  Some urinary frequency.  She was recently treated with Diflucan x2 because of vaginal itching.  She denies any discharge.  She reports she has urinary frequency but no burning.  She also reports her urine was cloudy.  She is concerned about a urinary tract infection.  She also states that there is a little pain in the right lower quadrant.  More midline than the lateral.  She is concerned about a possible urinary tract infection            Review of Systems     OBJECTIVE:     BP 98/64 (BP Location: Right arm, Patient Position: Sitting, Cuff Size: Adult Regular)   Pulse 78   Temp 97.4  F (36.3  C) (Temporal)   Resp 16   Wt 75 kg (165 lb 6.4 oz)   SpO2 98%   Breastfeeding No   BMI 27.52 kg/m    Body mass index is 27.52 kg/m .  Physical Exam  Constitutional:       Appearance: Normal appearance.   Abdominal:      General: Abdomen is flat. There is no distension.      Palpations: There is no mass.      Tenderness: There is abdominal tenderness. There is no guarding.      Comments: Patient has a little tenderness just slightly right to midline.   Musculoskeletal:      Comments: No CVA tenderness on percussion   Neurological:      Mental Status: She is alert.         Diagnostic Test Results:  Results for orders placed or performed in visit on 08/20/22 (from the past 24 hour(s))   UA reflex to Microscopic and Culture    Specimen: Urine, Midstream   Result Value Ref Range    Color Urine Light Yellow Colorless, Straw, Light Yellow, Yellow    Appearance Urine Clear Clear    Glucose Urine Negative Negative mg/dL    Bilirubin Urine Negative Negative    Ketones Urine Negative Negative mg/dL    Specific Gravity Urine 1.016 1.000 - 1.030    Blood Urine Small (A) Negative    pH Urine 7.0 5.0 - 9.0    Protein Albumin Urine Negative Negative  mg/dL    Urobilinogen Urine Normal Normal, 2.0 mg/dL    Nitrite Urine Negative Negative    Leukocyte Esterase Urine Negative Negative    RBC Urine 2 <=2 /HPF    WBC Urine <1 <=5 /HPF    Narrative    Urine Culture not indicated   Pregnancy, Urine (HCG)   Result Value Ref Range    hCG Urine Qualitative Negative Negative       ASSESSMENT/PLAN:           ICD-10-CM    1. Dysuria  R30.0 UA reflex to Microscopic and Culture     GC/Chlamydia by PCR   2. Pelvic pain in female  R10.2 Pregnancy, Urine (HCG)     CANCELED: Pregnancy, Urine (HCG)   Urine essentially negative.  Also discussed STD.  Discussed either proceeding with pelvic or urine.  Advised she would need 2 hours prior to getting a urine STD test.  Patient will think about it.  Will come back later today if she would like to pursue this or early next week.  Follow-up if getting worse      Morris Contreras MD  [facility] AND [facility]

## 2022-08-22 DIAGNOSIS — R30.0 DYSURIA: Primary | ICD-10-CM

## 2022-08-22 DIAGNOSIS — R30.0 DYSURIA: ICD-10-CM

## 2022-08-22 LAB
ALBUMIN UR-MCNC: NEGATIVE MG/DL
APPEARANCE UR: CLEAR
BILIRUB UR QL STRIP: NEGATIVE
C TRACH DNA SPEC QL PROBE+SIG AMP: NEGATIVE
COLOR UR AUTO: ABNORMAL
GLUCOSE UR STRIP-MCNC: NEGATIVE MG/DL
HGB UR QL STRIP: ABNORMAL
KETONES UR STRIP-MCNC: NEGATIVE MG/DL
LEUKOCYTE ESTERASE UR QL STRIP: NEGATIVE
MUCOUS THREADS #/AREA URNS LPF: PRESENT /LPF
N GONORRHOEA DNA SPEC QL NAA+PROBE: NEGATIVE
NITRATE UR QL: NEGATIVE
PH UR STRIP: 6 [PH] (ref 5–9)
RBC URINE: 3 /HPF
SP GR UR STRIP: 1.02 (ref 1–1.03)
UROBILINOGEN UR STRIP-MCNC: NORMAL MG/DL
WBC URINE: 0 /HPF

## 2022-08-22 PROCEDURE — 87591 N.GONORRHOEAE DNA AMP PROB: CPT | Mod: ZL | Performed by: NURSE PRACTITIONER

## 2022-08-22 PROCEDURE — 81001 URINALYSIS AUTO W/SCOPE: CPT | Mod: ZL | Performed by: NURSE PRACTITIONER

## 2022-09-09 ENCOUNTER — TELEPHONE (OUTPATIENT)
Dept: FAMILY MEDICINE | Facility: OTHER | Age: 27
End: 2022-09-09

## 2022-09-09 NOTE — TELEPHONE ENCOUNTER
Patient would like to get in with you if not today by Monday. Thinks having isues with sharp pain and bleeding from Nexplanon. Please call today to go over.

## 2022-09-12 ENCOUNTER — OFFICE VISIT (OUTPATIENT)
Dept: FAMILY MEDICINE | Facility: OTHER | Age: 27
End: 2022-09-12
Attending: NURSE PRACTITIONER
Payer: COMMERCIAL

## 2022-09-12 VITALS
BODY MASS INDEX: 28.22 KG/M2 | OXYGEN SATURATION: 99 % | SYSTOLIC BLOOD PRESSURE: 110 MMHG | WEIGHT: 169.4 LBS | RESPIRATION RATE: 16 BRPM | HEART RATE: 84 BPM | HEIGHT: 65 IN | DIASTOLIC BLOOD PRESSURE: 70 MMHG | TEMPERATURE: 97.9 F

## 2022-09-12 DIAGNOSIS — N94.6 DYSMENORRHEA: ICD-10-CM

## 2022-09-12 DIAGNOSIS — Z30.46 ENCOUNTER FOR NEXPLANON REMOVAL: Primary | ICD-10-CM

## 2022-09-12 PROBLEM — Z97.5 NEXPLANON IN PLACE: Status: RESOLVED | Noted: 2022-05-05 | Resolved: 2022-09-12

## 2022-09-12 PROCEDURE — 11982 REMOVE DRUG IMPLANT DEVICE: CPT | Performed by: NURSE PRACTITIONER

## 2022-09-12 ASSESSMENT — PAIN SCALES - GENERAL: PAINLEVEL: NO PAIN (0)

## 2022-09-12 NOTE — PROGRESS NOTES
"  Assessment & Plan   Problem List Items Addressed This Visit        Nervous and Auditory    Dysmenorrhea      Other Visit Diagnoses     Encounter for Nexplanon removal    -  Primary    Relevant Orders    REMOVAL NEXPLANON (Completed)      Dysmenorrhea with use of Nexplanon.  She requested this to be removed.  Informed consent was completed.  Nexplanon was removed as noted below without difficulties.  We did discuss that her birth control plan is appropriate but there is a risk of becoming pregnant.  She understands this and would like to continue with this plan for now.  We will follow-up as needed.      22 minutes spent on the date of the encounter doing chart review, history and exam, documentation and further activities per the note       No follow-ups on file.    MIGDALIA Tan [facility] AND \[facility] [place] [facility]\""   Nicky is a 27 year old, presenting for the following health issues:  Contraception      HPI     She comes in today to discuss her Nexplanon.  She had this placed in May 2022.  She continues on oral contraceptives for the first months to help manage any heavy bleeding.  She stopped the pill and was having some mild spotting and then had a light period for 5 days.  4 days later she had a heavy period and has had frequent intermittent bleeding since.  She been having some right lower quadrant pelvic pain.  She was evaluated for STDs and UTI which have been negative.  She is also noted some weight gain.  She has been on multiple forms of birth control other than Depo-Provera.  OCP since she was 14 years old.  She has 2 children, does not desire to get pregnant at this time.  She has discussed with her significant other and they have opted to use ovulation tracking and condoms at this time.    Review of Systems   See above      Objective    /70   Pulse 84   Temp 97.9  F (36.6  C)   Resp 16   Ht 1.651 m (5' 5\")   Wt 76.8 kg (169 lb 6.4 oz)   SpO2 99%   BMI 28.19 kg/m  "   Body mass index is 28.19 kg/m .  Physical Exam   L arm is positioned. Nexplanon is palpated easily in the subcutaneous tissue. Distal end is anesthestized with 2mL of lidocaine with epinephrine after cleansing with ChloraPrep. Area is sterilized. A #11 blade scalpel is used to make a longitudinal stab incision of 6-7mm. Nexplanon is dissected out through this site without difficulty. Patient tolerated the procedure well.  Steri-Strips and a compression dressing was placed.

## 2022-09-12 NOTE — NURSING NOTE
Patient presents today for possible removal of nexplanon. Patient has had heavy bleeding off and on with right lower abdominal pain.    Medication Reconciliation Complete    Ai Barth LPN  9/12/2022 1:49 PM

## 2022-10-03 ENCOUNTER — TELEPHONE (OUTPATIENT)
Dept: FAMILY MEDICINE | Facility: OTHER | Age: 27
End: 2022-10-03

## 2022-10-03 DIAGNOSIS — Z30.011 ENCOUNTER FOR INITIAL PRESCRIPTION OF CONTRACEPTIVE PILLS: Primary | ICD-10-CM

## 2022-10-03 NOTE — TELEPHONE ENCOUNTER
Luiza Murillo just removed her Nexplanon. Patient would like to start a BC pill. She uses [facility]'s pharmacy. Please advise.    Cortney Scott on 10/3/2022 at 9:14 AM

## 2022-10-04 NOTE — TELEPHONE ENCOUNTER
Patient was transferred to scheduling to make lab only appointment.    Ai Barth LPN on 10/4/2022 at 8:51 AM

## 2022-10-04 NOTE — TELEPHONE ENCOUNTER
She will need to come in for urine pregnancy test before I can order birth control.  I will place an order for this, she can complete a lab only appointment and once I get a negative test, I will send in pills for her. MIGDALIA Tan CNP on 10/4/2022 at 8:30 AM

## 2022-11-03 ENCOUNTER — OFFICE VISIT (OUTPATIENT)
Dept: FAMILY MEDICINE | Facility: OTHER | Age: 27
End: 2022-11-03
Attending: NURSE PRACTITIONER
Payer: COMMERCIAL

## 2022-11-03 VITALS
DIASTOLIC BLOOD PRESSURE: 68 MMHG | SYSTOLIC BLOOD PRESSURE: 104 MMHG | BODY MASS INDEX: 28.39 KG/M2 | HEART RATE: 80 BPM | OXYGEN SATURATION: 98 % | WEIGHT: 170.4 LBS | TEMPERATURE: 98.6 F | RESPIRATION RATE: 16 BRPM | HEIGHT: 65 IN

## 2022-11-03 DIAGNOSIS — L30.9 ECZEMA, UNSPECIFIED TYPE: ICD-10-CM

## 2022-11-03 DIAGNOSIS — F90.2 ATTENTION DEFICIT HYPERACTIVITY DISORDER (ADHD), COMBINED TYPE: ICD-10-CM

## 2022-11-03 DIAGNOSIS — N94.6 DYSMENORRHEA: Primary | ICD-10-CM

## 2022-11-03 DIAGNOSIS — F41.8 DEPRESSION WITH ANXIETY: ICD-10-CM

## 2022-11-03 LAB
CREAT UR-MCNC: 198 MG/DL
HCG UR QL: NEGATIVE

## 2022-11-03 PROCEDURE — 81025 URINE PREGNANCY TEST: CPT | Mod: ZL | Performed by: NURSE PRACTITIONER

## 2022-11-03 PROCEDURE — 80307 DRUG TEST PRSMV CHEM ANLYZR: CPT | Mod: ZL | Performed by: NURSE PRACTITIONER

## 2022-11-03 PROCEDURE — 99214 OFFICE O/P EST MOD 30 MIN: CPT | Performed by: NURSE PRACTITIONER

## 2022-11-03 RX ORDER — FLUOXETINE 10 MG/1
CAPSULE ORAL
COMMUNITY
Start: 2022-09-29 | End: 2022-11-03

## 2022-11-03 RX ORDER — FLUOXETINE 10 MG/1
10 CAPSULE ORAL DAILY
Qty: 90 CAPSULE | Refills: 3 | Status: SHIPPED | OUTPATIENT
Start: 2022-11-03 | End: 2023-01-26 | Stop reason: DRUGHIGH

## 2022-11-03 RX ORDER — DEXTROAMPHETAMINE SACCHARATE, AMPHETAMINE ASPARTATE MONOHYDRATE, DEXTROAMPHETAMINE SULFATE AND AMPHETAMINE SULFATE 3.75; 3.75; 3.75; 3.75 MG/1; MG/1; MG/1; MG/1
15 CAPSULE, EXTENDED RELEASE ORAL DAILY
Qty: 30 CAPSULE | Refills: 0 | Status: SHIPPED | OUTPATIENT
Start: 2022-12-04 | End: 2023-01-03

## 2022-11-03 RX ORDER — DEXTROAMPHETAMINE SACCHARATE, AMPHETAMINE ASPARTATE MONOHYDRATE, DEXTROAMPHETAMINE SULFATE AND AMPHETAMINE SULFATE 3.75; 3.75; 3.75; 3.75 MG/1; MG/1; MG/1; MG/1
15 CAPSULE, EXTENDED RELEASE ORAL DAILY
Qty: 30 CAPSULE | Refills: 0 | Status: SHIPPED | OUTPATIENT
Start: 2023-01-04 | End: 2023-02-03

## 2022-11-03 RX ORDER — DEXTROAMPHETAMINE SACCHARATE, AMPHETAMINE ASPARTATE MONOHYDRATE, DEXTROAMPHETAMINE SULFATE AND AMPHETAMINE SULFATE 3.75; 3.75; 3.75; 3.75 MG/1; MG/1; MG/1; MG/1
15 CAPSULE, EXTENDED RELEASE ORAL DAILY
Qty: 30 CAPSULE | Refills: 0 | Status: SHIPPED | OUTPATIENT
Start: 2022-11-03 | End: 2022-12-03

## 2022-11-03 RX ORDER — TRIAMCINOLONE ACETONIDE 1 MG/G
CREAM TOPICAL 2 TIMES DAILY PRN
Qty: 80 G | Refills: 1 | Status: SHIPPED | OUTPATIENT
Start: 2022-11-03

## 2022-11-03 RX ORDER — DROSPIRENONE AND ETHINYL ESTRADIOL 0.02-3(28)
1 KIT ORAL DAILY
COMMUNITY
End: 2022-11-03

## 2022-11-03 ASSESSMENT — PATIENT HEALTH QUESTIONNAIRE - PHQ9
SUM OF ALL RESPONSES TO PHQ QUESTIONS 1-9: 5
10. IF YOU CHECKED OFF ANY PROBLEMS, HOW DIFFICULT HAVE THESE PROBLEMS MADE IT FOR YOU TO DO YOUR WORK, TAKE CARE OF THINGS AT HOME, OR GET ALONG WITH OTHER PEOPLE: NOT DIFFICULT AT ALL
SUM OF ALL RESPONSES TO PHQ QUESTIONS 1-9: 5

## 2022-11-03 ASSESSMENT — ANXIETY QUESTIONNAIRES
4. TROUBLE RELAXING: MORE THAN HALF THE DAYS
GAD7 TOTAL SCORE: 13
8. IF YOU CHECKED OFF ANY PROBLEMS, HOW DIFFICULT HAVE THESE MADE IT FOR YOU TO DO YOUR WORK, TAKE CARE OF THINGS AT HOME, OR GET ALONG WITH OTHER PEOPLE?: VERY DIFFICULT
5. BEING SO RESTLESS THAT IT IS HARD TO SIT STILL: SEVERAL DAYS
GAD7 TOTAL SCORE: 13
7. FEELING AFRAID AS IF SOMETHING AWFUL MIGHT HAPPEN: MORE THAN HALF THE DAYS
1. FEELING NERVOUS, ANXIOUS, OR ON EDGE: MORE THAN HALF THE DAYS
2. NOT BEING ABLE TO STOP OR CONTROL WORRYING: MORE THAN HALF THE DAYS
6. BECOMING EASILY ANNOYED OR IRRITABLE: MORE THAN HALF THE DAYS
7. FEELING AFRAID AS IF SOMETHING AWFUL MIGHT HAPPEN: MORE THAN HALF THE DAYS
3. WORRYING TOO MUCH ABOUT DIFFERENT THINGS: MORE THAN HALF THE DAYS
GAD7 TOTAL SCORE: 13
IF YOU CHECKED OFF ANY PROBLEMS ON THIS QUESTIONNAIRE, HOW DIFFICULT HAVE THESE PROBLEMS MADE IT FOR YOU TO DO YOUR WORK, TAKE CARE OF THINGS AT HOME, OR GET ALONG WITH OTHER PEOPLE: VERY DIFFICULT

## 2022-11-03 ASSESSMENT — PAIN SCALES - GENERAL: PAINLEVEL: NO PAIN (0)

## 2022-11-03 NOTE — NURSING NOTE
Patient is currently taking birth control and complains of break through bleeding.    Medication Reconciliation Complete    Ai Barth LPN  11/3/2022 3:08 PM

## 2022-11-03 NOTE — PROGRESS NOTES
Assessment & Plan   Problem List Items Addressed This Visit        Nervous and Auditory    Dysmenorrhea - Primary    Relevant Medications    norgestrel-ethinyl estradiol (LO/OVRAL) 0.3-30 MG-MCG tablet    Other Relevant Orders    Pregnancy, Urine (HCG) (Completed)       Behavioral    Depression with anxiety    Relevant Medications    FLUoxetine (PROZAC) 10 MG capsule   Other Visit Diagnoses     Attention deficit hyperactivity disorder (ADHD), combined type        Relevant Medications    amphetamine-dextroamphetamine (ADDERALL XR) 15 MG 24 hr capsule    amphetamine-dextroamphetamine (ADDERALL XR) 15 MG 24 hr capsule (Start on 12/4/2022)    amphetamine-dextroamphetamine (ADDERALL XR) 15 MG 24 hr capsule (Start on 1/4/2023)    Other Relevant Orders    Drug Confirmation Panel Urine with Creat    Eczema, unspecified type        Relevant Medications    triamcinolone (KENALOG) 0.1 % external cream      For her dysmenorrhea, will use low overall 4 pills daily for 5 days and then move right into the next pack.  We will try this for the next 1 to 2 months and if symptoms persist will refer to gynecology.  Urine pregnancy was negative.    Fluoxetine 10 mg daily was prescribed for depression with anxiety.  [facility] was reviewed and controlled substance agreement was put in place.  Adderall XR 50 mg daily was prescribed x3 months.  Adult ADHD self-report scale was completed.  Part a shows 5 positive answers, part B shows 10 positive answers.  She will need to follow-up every 3 months for medication refills.    Triamcinolone cream was ordered per request.    Review of the result(s) of each unique test - urine hcg  Ordering of each unique test  Prescription drug management  32 minutes spent on the date of the encounter doing chart review, history and exam, documentation and further activities per the note    No follow-ups on file.    MIGDALIA Tan [facility] AND [facility] is a 27  year old, presenting for the following health issues:  Contraception      Contraception    History of Present Illness       Reason for visit:  Birth control    She eats 2-3 servings of fruits and vegetables daily.She consumes 1 sweetened beverage(s) daily.She exercises with enough effort to increase her heart rate 10 to 19 minutes per day.  She exercises with enough effort to increase her heart rate 3 or less days per week. She is missing 7 dose(s) of medications per week.    Today's PHQ-9         PHQ-9 Total Score: 5    PHQ-9 Q9 Thoughts of better off dead/self-harm past 2 weeks :   Not at all    How difficult have these problems made it for you to do your work, take care of things at home, or get along with other people: Not difficult at all  Today's GAYLE-7 Score: 13     She comes in today initially to discuss birth control.  Nexplanon has been used in the past, this was removed as she did not like this.  She was trying to go without birth control but is worried about getting pregnant.  She started some birth control pills that she had leftover at home, she is needed take 2 tablets a day otherwise she is bleeding.  She has been doing this for the past month.  She has a history of using oral contraception, birth control patch which would follow-up early, minipill, Nexplanon and IUD.  She is not happy with any of these options and is looking for an alternative due to the abnormal uterine bleeding.    She also has been receiving mental health care through [facility].  She does have a diagnosis of ADHD.  She has used Adderall 15 mg which has been helpful.  She has been missing her appointments as she needs to be seen monthly and this is been difficult with her schedule.  Because she misses her appointments she is not able to get her medications.  She is also used fluoxetine 10 mg for anxiety.  She is wondering if I be willing to manage these for her.    Finally she does have eczema and is wondering if  "she can get a fill of triamcinolone cream    Review of Systems   See above      Objective    /68   Pulse 80   Temp 98.6  F (37  C)   Resp 16   Ht 1.651 m (5' 5\")   Wt 77.3 kg (170 lb 6.4 oz)   LMP  (LMP Unknown)   SpO2 98%   BMI 28.36 kg/m    Body mass index is 28.36 kg/m .  Physical Exam   GENERAL: healthy, alert and no distress  NEURO: Normal strength and tone, mentation intact and speech normal  PSYCH: mentation appears normal, affect normal/bright    Results for orders placed or performed in visit on 11/03/22 (from the past 24 hour(s))   Drug Confirmation Panel Urine with Creat    Narrative    The following orders were created for panel order Drug Confirmation Panel Urine with Creat.  Procedure                               Abnormality         Status                     ---------                               -----------         ------                     Urine Drug Confirmation ...[[id number]]                      In process                 Urine Creatinine for Myron...[[id number]]                      Final result                 Please view results for these tests on the individual orders.   Pregnancy, Urine (HCG)   Result Value Ref Range    hCG Urine Qualitative Negative Negative   Urine Creatinine for Drug Screen Panel   Result Value Ref Range    Creatinine Urine for Drug Screen 198 mg/dL                   "

## 2022-11-03 NOTE — LETTER
[facility]  11/03/22  Patient: Nicky Hart  YOB: 1995  Medical Record Number: [id number]                                                                                  Non-Opioid Controlled Substance Agreement    This is an agreement between you and your provider regarding safe and appropriate use of controlled substances prescribed by your care team. Controlled substances are?medicines that can cause physical and mental dependence (abuse).     There are strict laws about having and using these medicines. We here at [facility] are  committed to working with you in your efforts to get better. To support you in this work, we'll help you schedule regular office appointments for medicine refills. If we must cancel or change your appointment for any reason, we'll make sure you have enough medicine to last until your next appointment.     As a Provider, I will:     Listen carefully to your concerns while treating you with respect.     Recommend a treatment plan that I believe is in your best interest and may involve therapies other than medicine.      Talk with you often about the possible benefits and the risk of harm of any medicine that we prescribe for you.    Assess the safety of this medicine and check how well it works.      Provide a plan on how to taper (discontinue or go off) using this medicine if the decision is made to stop its use.      ::  As a Patient, I understand controlled substances:       Are prescribed by my care provider to help me function or work and manage my condition(s).?    Are strong medicines and can cause serious side effects.       Need to be taken exactly as prescribed.?Combining controlled substances with certain medicines or chemicals (such as illegal drugs, alcohol, sedatives, sleeping pills, and benzodiazepines) can be dangerous or even fatal.? If I stop taking my medicines suddenly, I may have severe withdrawal symptoms.     The risks,  benefits, and side effects of these medicine(s) were explained to me. I agree that:    1. I will take part in other treatments as advised by my care team. This may be psychiatry or counseling, physical therapy, behavioral therapy, group treatment or a referral to specialist.    2. I will keep all my appointments and understand this is part of the monitoring of controlled substances.?My care team may require an office visit for EVERY controlled substance refill. If I miss appointments or don t follow instructions, my care team may stop my medicine    3. I will take my medicines as prescribed. I will not change the dose or schedule unless my care team tells me to. There will be no refills if I run out early.      4. I may be asked to come to the clinic and complete a urine drug test or complete a pill count. If I don t give a urine sample or participate in a pill count, the care team may stop my medicine.    5. I will only receive controlled substance prescriptions from this clinic. If I am treated by another provider, I will tell them that I am taking controlled substances and that I have a treatment agreement with this provider. I will inform my [facility] care team within one business day if I am given a prescription for any controlled substance by another healthcare provider. My [facility] care team can contact other providers and pharmacists about my use of any medicines.    6. It is up to me to make sure that I don't run out of my medicines on weekends or holidays.?If my care team is willing to refill my prescription without a visit, I must request refills only during office hours. Refills may take up to 3 business days to process. I will use one pharmacy to fill all my controlled substance prescriptions. I will notify the clinic about any changes to my insurance or medicine availability.    7. I am responsible for my prescriptions. If the medicine/prescription is lost, stolen or destroyed, it will  not be replaced.?I also agree not to share controlled substance medicines with anyone.     8. I am aware I should not use any illegal or recreational drugs. I agree not to drink alcohol unless my care team says I can.     9. If I enroll in the [place] Medical Cannabis program, I will tell my care team before my next refill.    10. I will tell my care team right away if I become pregnant, have a new medical problem treated outside of my regular clinic, or have a change in my medicines.     11. I understand that this medicine can affect my thinking, judgment and reaction time.? Alcohol and drugs affect the brain and body, which can affect the safety of my driving. Being under the influence of alcohol or drugs can affect my decision-making, behaviors, personal safety and the safety of others. Driving while impaired (DWI) can occur if a person is driving, operating or in physical control of a car, motorcycle, boat, snowmobile, ATV, motorbike, off-road vehicle or any other motor vehicle ([place] Statute 169A.20). I understand the risk if I choose to drive or operate any vehicle or machinery.    I understand that if I do not follow any of the conditions above, my prescriptions or treatment may be stopped or changed.   I agree that my provider, clinic care team and pharmacy may work with any city, state or federal law enforcement agency that investigates the misuse, sale or other diversion of my controlled medicine. I will allow my provider to discuss my care with, or share a copy of, this agreement with any other treating provider, pharmacy or emergency room where I receive care.     I have read this agreement and have asked questions about anything I did not understand.    ________________________________________________________  Patient Signature - Nicky Hart     ___________________                   Date     ________________________________________________________  Provider Signature - MIGDALIA Tan CNP        ___________________                   Date     ________________________________________________________  Witness Signature (required if provider not present while patient signing)          ___________________                   Date

## 2022-11-10 ENCOUNTER — TELEPHONE (OUTPATIENT)
Dept: FAMILY MEDICINE | Facility: OTHER | Age: 27
End: 2022-11-10

## 2022-11-10 NOTE — TELEPHONE ENCOUNTER
DMO - On November 3rd a refill of prozac to [facility].  [facility] is stating they don't have it.  Patient is requesting the RX be resent.    Sadie Dumont on 11/10/2022 at 9:41 AM'

## 2022-11-10 NOTE — TELEPHONE ENCOUNTER
Medication was sent to [facility] pharmacy 11/3/22 but state they did not receive it. VO was given to phamacist with read back since medication was ordered/ prescribed by PCP and sent. Patient called and notified medication should be ready for pickup today and to call pharmacy with further questions or concerns    Corinne R Thayer, RN on 11/10/2022 at 10:12 AM

## 2022-12-04 ENCOUNTER — OFFICE VISIT (OUTPATIENT)
Dept: FAMILY MEDICINE | Facility: OTHER | Age: 27
End: 2022-12-04
Attending: FAMILY MEDICINE
Payer: COMMERCIAL

## 2022-12-04 VITALS
TEMPERATURE: 99.2 F | SYSTOLIC BLOOD PRESSURE: 112 MMHG | OXYGEN SATURATION: 97 % | BODY MASS INDEX: 28.29 KG/M2 | DIASTOLIC BLOOD PRESSURE: 74 MMHG | WEIGHT: 169.8 LBS | HEART RATE: 92 BPM | RESPIRATION RATE: 20 BRPM | HEIGHT: 65 IN

## 2022-12-04 DIAGNOSIS — R07.0 THROAT PAIN: Primary | ICD-10-CM

## 2022-12-04 DIAGNOSIS — R05.1 ACUTE COUGH: ICD-10-CM

## 2022-12-04 DIAGNOSIS — J20.9 ACUTE BRONCHITIS, UNSPECIFIED ORGANISM: ICD-10-CM

## 2022-12-04 LAB — GROUP A STREP BY PCR: NOT DETECTED

## 2022-12-04 PROCEDURE — 87651 STREP A DNA AMP PROBE: CPT | Mod: ZL | Performed by: NURSE PRACTITIONER

## 2022-12-04 PROCEDURE — 99213 OFFICE O/P EST LOW 20 MIN: CPT | Performed by: NURSE PRACTITIONER

## 2022-12-04 RX ORDER — BENZONATATE 200 MG/1
200 CAPSULE ORAL 3 TIMES DAILY PRN
Qty: 15 CAPSULE | Refills: 0 | Status: SHIPPED | OUTPATIENT
Start: 2022-12-04 | End: 2022-12-09

## 2022-12-04 ASSESSMENT — PAIN SCALES - GENERAL: PAINLEVEL: MILD PAIN (2)

## 2022-12-04 NOTE — PROGRESS NOTES
ASSESSMENT/PLAN:    I have reviewed the nursing notes.  I have reviewed the findings, diagnosis, plan and need for follow up with the patient.    1. Throat pain  - Group A Streptococcus PCR Throat Swab  Negative strep results.   Discussed with patient strep test is very accurate and suspect viral etiology of sore throat.    2. Acute cough  3. Acute bronchitis, unspecified organism  Exam/presentation and symptoms are most consistent with acute bronchitis which is most often caused by viral upper respiratory infection and is not treated with antibiotics.  I discussed with her if she is feels no signs of improvement or if the cough continues to be productive of thick phlegm for 2 weeks in duration or greater, would then recommend reevaluation.  Tessalon Perles prescribed for cough suppression.  Without fevers, body aches, etc. I have very low suspicion of influenza, and she has had negative covid tests.  Testing would not change the treatment plan.  May use over-the-counter medicine as needed if desired.  - benzonatate (TESSALON) 200 MG capsule; Take 1 capsule (200 mg) by mouth 3 times daily as needed for cough  Dispense: 15 capsule; Refill: 0  - Symptomatic treatment - Encouraged fluids, salt water gargles, honey (only if greater than 1 year in age due to risk of botulism), elevation, humidifier, sinus rinse/netti pot, lozenges, tea, topical vapor rub, popsicles, rest, etc   -May use over-the-counter Tylenol or ibuprofen PRN    Discussed warning signs/symptoms indicative of need to f/u    Follow up if symptoms persist or worsen or concerns    I explained my diagnostic considerations and recommendations to the patient, who voiced understanding and agreement with the treatment plan. All questions were answered. We discussed potential side effects of any prescribed or recommended therapies, as well as expectations for response to treatments.    Joanna Curry NP  12/4/2022  4:18 PM    HPI:  Nicky THEO Hart is a 27 year  "old female who presents to [facility] today for concerns of sore throat, cough that is occasionally productive of green phlegm, ear pain \"feel on fire\" x 6 days. Daughter has strep, wants to rule that out. Throat as pretty sore, but now not as bad as it has been. Past few days worse with the cough. Coughing fits when it is dry. No fevers. She is a hospice nurse. Denies body aches and fatigue. Wants to make sure she is ok to go to work. Coughing fits much worse at night.    Negative covid test at home.     ROS otherwise unremarkable.     Past Medical History:   Diagnosis Date     Chronic cough 2002    resolved     Closed fracture of lower end of radius 2006    L arm     History of early menarche 2007     Preeclampsia 2019     Pregnancy          Subacute endomyometritis 2017     Past Surgical History:   Procedure Laterality Date     DILATION AND CURETTAGE N/A 2017    Dr. Hazel; [facility] - retained placenta/endometritis     Social History     Tobacco Use     Smoking status: Never     Smokeless tobacco: Never   Substance Use Topics     Alcohol use: Not Currently     Comment: rare - 3 times per year     Current Outpatient Medications   Medication Sig Dispense Refill     amphetamine-dextroamphetamine (ADDERALL XR) 15 MG 24 hr capsule Take 1 capsule (15 mg) by mouth daily for 30 days 30 capsule 0     [START ON 2023] amphetamine-dextroamphetamine (ADDERALL XR) 15 MG 24 hr capsule Take 1 capsule (15 mg) by mouth daily for 30 days 30 capsule 0     benzonatate (TESSALON) 200 MG capsule Take 1 capsule (200 mg) by mouth 3 times daily as needed for cough 15 capsule 0     eletriptan (RELPAX) 40 MG tablet Take 1 tablet (40 mg) by mouth at onset of headache for migraine May repeat in 2 hours. Max 2 tablets/24 hours.  Updated prescription instructions 30 tablet 3     EPINEPHrine (ANY BX GENERIC EQUIV) 0.3 MG/0.3ML injection 2-pack Inject 0.3 mLs (0.3 mg) into the muscle once as needed for anaphylaxis " "(Repeat in 5-15 minutes if needed) for allergic reaction for up to 1 dose. 2 each 11     FLUoxetine (PROZAC) 10 MG capsule Take 1 capsule (10 mg) by mouth daily 90 capsule 3     norgestrel-ethinyl estradiol (LO/OVRAL) 0.3-30 MG-MCG tablet Take 4 tablets daily for 5 days then start new pack 1 tablet daily 84 tablet 3     triamcinolone (KENALOG) 0.1 % external cream Apply topically 2 times daily as needed for irritation 80 g 1     Allergies   Allergen Reactions     Bermuda Grass Itching     Dust Mite Extract Itching     Mold Itching     Pollen Extract Itching     Tree Nuts  [Nuts] Itching     Augmentin Diarrhea     Past medical history, past surgical history, current medications and allergies reviewed and accurate to the best of my knowledge.      ROS:  Refer to HPI    /74   Pulse 92   Temp 99.2  F (37.3  C) (Temporal)   Resp 20   Ht 1.645 m (5' 4.75\")   Wt 77 kg (169 lb 12.8 oz)   LMP  (LMP Unknown)   SpO2 97%   Breastfeeding No   BMI 28.47 kg/m      EXAM:  General Appearance: Well appearing 27 year old female, appropriate appearance for age. No acute distress   Ears: Left TM intact, translucent with bony landmarks appreciated, no erythema, no effusion, no bulging, no purulence.  Right TM intact, translucent with bony landmarks appreciated, no erythema, no effusion, no bulging, no purulence.  Left auditory canal clear.  Right auditory canal clear.  Normal external ears, non tender.  Eyes: conjunctivae normal without erythema or irritation, corneas clear, no drainage or crusting, no eyelid swelling, pupils equal   Oropharynx: moist mucous membranes, posterior pharynx with mild erythema, tonsils symmetric and 1+, + erythema, no exudates or petechiae, no post nasal drip seen, no trismus, voice clear.    Sinuses:  No sinus tenderness upon palpation of the frontal or maxillary sinuses  Nose:  Bilateral nares: no erythema, no edema, no drainage or congestion   Neck: supple without adenopathy  Respiratory: " normal chest wall and respirations.  Normal effort.  Clear to auscultation bilaterally, no wheezing, crackles or rhonchi.  No increased work of breathing.  No coughing throughout visit.   Cardiac: RRR with no murmurs  Psychological: normal affect, alert, oriented, and pleasant.     Results for orders placed or performed in visit on 12/04/22   Group A Streptococcus PCR Throat Swab     Status: Normal    Specimen: Throat; Swab   Result Value Ref Range    Group A strep by PCR Not Detected Not Detected    Narrative    The Xpert Xpress Strep A test, performed on the [employer], is a rapid, qualitative in vitro diagnostic test for the detection of Streptococcus pyogenes (Group A ß-hemolytic Streptococcus, Strep A) in throat swab specimens from patients with signs and symptoms of pharyngitis. The Xpert Xpress Strep A test can be used as an aid in the diagnosis of Group A Streptococcal pharyngitis. The assay is not intended to monitor treatment for Group A Streptococcus infections. The Xpert Xpress Strep A test utilizes an automated real-time polymerase chain reaction (PCR) to detect Streptococcus pyogenes DNA.

## 2023-01-26 ENCOUNTER — OFFICE VISIT (OUTPATIENT)
Dept: FAMILY MEDICINE | Facility: OTHER | Age: 28
End: 2023-01-26
Attending: NURSE PRACTITIONER
Payer: COMMERCIAL

## 2023-01-26 VITALS
DIASTOLIC BLOOD PRESSURE: 74 MMHG | HEART RATE: 72 BPM | RESPIRATION RATE: 16 BRPM | WEIGHT: 174 LBS | TEMPERATURE: 98.2 F | BODY MASS INDEX: 28.99 KG/M2 | HEIGHT: 65 IN | SYSTOLIC BLOOD PRESSURE: 110 MMHG

## 2023-01-26 DIAGNOSIS — F41.9 ANXIETY: ICD-10-CM

## 2023-01-26 DIAGNOSIS — F60.3 BORDERLINE PERSONALITY DISORDER (H): ICD-10-CM

## 2023-01-26 DIAGNOSIS — G44.219 EPISODIC TENSION-TYPE HEADACHE, NOT INTRACTABLE: ICD-10-CM

## 2023-01-26 DIAGNOSIS — F43.23 ADJUSTMENT DISORDER WITH MIXED ANXIETY AND DEPRESSED MOOD: Primary | ICD-10-CM

## 2023-01-26 PROCEDURE — 99214 OFFICE O/P EST MOD 30 MIN: CPT | Performed by: NURSE PRACTITIONER

## 2023-01-26 RX ORDER — BUSPIRONE HYDROCHLORIDE 5 MG/1
TABLET ORAL
Qty: 90 TABLET | Refills: 3 | Status: SHIPPED | OUTPATIENT
Start: 2023-01-26 | End: 2023-03-31

## 2023-01-26 RX ORDER — ELETRIPTAN HYDROBROMIDE 40 MG/1
40 TABLET, FILM COATED ORAL
Qty: 30 TABLET | Refills: 3 | Status: SHIPPED | OUTPATIENT
Start: 2023-01-26 | End: 2023-03-31

## 2023-01-26 RX ORDER — ALPRAZOLAM 0.5 MG
0.5 TABLET ORAL 3 TIMES DAILY PRN
Qty: 10 TABLET | Refills: 0 | Status: SHIPPED | OUTPATIENT
Start: 2023-01-26 | End: 2023-03-31

## 2023-01-26 ASSESSMENT — ANXIETY QUESTIONNAIRES
GAD7 TOTAL SCORE: 15
7. FEELING AFRAID AS IF SOMETHING AWFUL MIGHT HAPPEN: NEARLY EVERY DAY
1. FEELING NERVOUS, ANXIOUS, OR ON EDGE: MORE THAN HALF THE DAYS
7. FEELING AFRAID AS IF SOMETHING AWFUL MIGHT HAPPEN: NEARLY EVERY DAY
5. BEING SO RESTLESS THAT IT IS HARD TO SIT STILL: SEVERAL DAYS
6. BECOMING EASILY ANNOYED OR IRRITABLE: NEARLY EVERY DAY
IF YOU CHECKED OFF ANY PROBLEMS ON THIS QUESTIONNAIRE, HOW DIFFICULT HAVE THESE PROBLEMS MADE IT FOR YOU TO DO YOUR WORK, TAKE CARE OF THINGS AT HOME, OR GET ALONG WITH OTHER PEOPLE: SOMEWHAT DIFFICULT
3. WORRYING TOO MUCH ABOUT DIFFERENT THINGS: MORE THAN HALF THE DAYS
4. TROUBLE RELAXING: SEVERAL DAYS
2. NOT BEING ABLE TO STOP OR CONTROL WORRYING: NEARLY EVERY DAY
GAD7 TOTAL SCORE: 15
8. IF YOU CHECKED OFF ANY PROBLEMS, HOW DIFFICULT HAVE THESE MADE IT FOR YOU TO DO YOUR WORK, TAKE CARE OF THINGS AT HOME, OR GET ALONG WITH OTHER PEOPLE?: SOMEWHAT DIFFICULT

## 2023-01-26 NOTE — PROGRESS NOTES
Assessment & Plan   Problem List Items Addressed This Visit        Behavioral    Adjustment disorder with mixed anxiety and depressed mood - Primary    Relevant Medications    FLUoxetine (PROZAC) 20 MG capsule    busPIRone (BUSPAR) 5 MG tablet    Borderline personality disorder (H)   Other Visit Diagnoses     Anxiety        Relevant Medications    FLUoxetine (PROZAC) 20 MG capsule    busPIRone (BUSPAR) 5 MG tablet    ALPRAZolam (XANAX) 0.5 MG tablet    Episodic tension-type headache, not intractable        Relevant Medications    FLUoxetine (PROZAC) 20 MG capsule    eletriptan (RELPAX) 40 MG tablet      Will increase fluoxetine officially to 20 mg daily.  We will have her start buspirone 5 mg daily as needed for anxiety especially on days she works.  She can increase this up to 3 tablets daily.  Xanax was provided to be used as needed for panic attacks.  [facility] was reviewed and as expected.  We will plan to see her back in 3 to 4 weeks for recheck on anxiety and depression, sooner if she is having any concerns.  Will address refills of Adderall at that time.  She does request a refill of her eletriptan for headaches, this was placed.    Prescription drug management  32 minutes spent on the date of the encounter doing chart review, history and exam, documentation and further activities per the note       No follow-ups on file.    MIGDALIA Tan [facility] AND [facility]   Nicky is a 27 year old, presenting for the following health issues:  Anxiety      History of Present Illness       Mental Health Follow-up:  Patient presents to follow-up on Anxiety.    Patient's anxiety since last visit has been:  Worse  The patient is having other symptoms associated with anxiety.  Any significant life events: health concerns  Patient is feeling anxious or having panic attacks.  Patient has no concerns about alcohol or drug use.    She eats 2-3 servings of fruits and vegetables daily.She  "consumes 0 sweetened beverage(s) daily.She exercises with enough effort to increase her heart rate 10 to 19 minutes per day.  She exercises with enough effort to increase her heart rate 3 or less days per week. She is missing 2 dose(s) of medications per week.  Today's GAYLE-7 Score: 15     She comes in today for evaluation of depression and anxiety.  She was 1 week away from finalizing her divorce with her and her  reconciled.  They have canceled the divorce and are doing well.  Unfortunately, he was diagnosed with leukemia last year, did treatments from April to September and was in remission.  At the end of December his leukemia returned.  He is being set up to the [facility], has a consult next week and considering a bone marrow transplant.  This has increased stress for her.  He also is notable to work right now which has been putting is financial burden on her.  She is continuing to do therapy on a weekly basis.  She does have a diagnosis of adjustment disorder with mixed anxiety and depression and was recently diagnosed with borderline personality disorder.  She also has ADHD.  She has been taking fluoxetine 10 mg daily.  Couple weeks ago she increase to 20 mg daily due to the worsening anxiety while she was waiting to get into the clinic.  She has having panic attacks and wondering about something that can help her with this.  A friend of hers gave her a hydroxyzine which she did not find any relief with.  She continues to use her Adderall daily as needed, mostly on days that she works.  She has stopped her birth control as they do not know if she will be able to get pregnant once he continues with chemotherapy.  If she gets pregnant, this would be happy event for them but they are aware that she may not be able to get pregnant at this time.    Review of Systems   As noted above      Objective    /74   Pulse 72   Temp 98.2  F (36.8  C)   Resp 16   Ht 1.645 m (5' 4.75\")   Wt 78.9 " kg (174 lb)   LMP 01/01/2023   BMI 29.18 kg/m    Body mass index is 29.18 kg/m .  Physical Exam   GENERAL: healthy, alert and no distress  EYES: Eyes grossly normal to inspection  NEURO: Normal strength and tone, mentation intact and speech normal  PSYCH: mentation appears normal, affect normal/bright

## 2023-01-26 NOTE — NURSING NOTE
Patient presents today for follow up on anxiety and ADHD.    Medication Reconciliation Complete    Ai Barth LPN  1/26/2023 8:07 AM

## 2023-03-31 ENCOUNTER — OFFICE VISIT (OUTPATIENT)
Dept: FAMILY MEDICINE | Facility: OTHER | Age: 28
End: 2023-03-31
Attending: NURSE PRACTITIONER
Payer: COMMERCIAL

## 2023-03-31 VITALS
SYSTOLIC BLOOD PRESSURE: 108 MMHG | OXYGEN SATURATION: 99 % | DIASTOLIC BLOOD PRESSURE: 76 MMHG | BODY MASS INDEX: 29.35 KG/M2 | HEART RATE: 88 BPM | WEIGHT: 175 LBS | RESPIRATION RATE: 16 BRPM

## 2023-03-31 DIAGNOSIS — F60.3 BORDERLINE PERSONALITY DISORDER (H): ICD-10-CM

## 2023-03-31 DIAGNOSIS — G43.809 OTHER MIGRAINE WITHOUT STATUS MIGRAINOSUS, NOT INTRACTABLE: ICD-10-CM

## 2023-03-31 DIAGNOSIS — Z13.29 SCREENING FOR THYROID DISORDER: ICD-10-CM

## 2023-03-31 DIAGNOSIS — F41.9 ANXIETY: ICD-10-CM

## 2023-03-31 DIAGNOSIS — F41.8 DEPRESSION WITH ANXIETY: ICD-10-CM

## 2023-03-31 DIAGNOSIS — Z00.00 ROUTINE GENERAL MEDICAL EXAMINATION AT A HEALTH CARE FACILITY: Primary | ICD-10-CM

## 2023-03-31 DIAGNOSIS — F90.2 ATTENTION DEFICIT HYPERACTIVITY DISORDER (ADHD), COMBINED TYPE: ICD-10-CM

## 2023-03-31 DIAGNOSIS — F43.23 ADJUSTMENT DISORDER WITH MIXED ANXIETY AND DEPRESSED MOOD: ICD-10-CM

## 2023-03-31 LAB
ALBUMIN SERPL BCG-MCNC: 4.7 G/DL (ref 3.5–5.2)
ALP SERPL-CCNC: 97 U/L (ref 35–104)
ALT SERPL W P-5'-P-CCNC: 29 U/L (ref 10–35)
ANION GAP SERPL CALCULATED.3IONS-SCNC: 10 MMOL/L (ref 7–15)
AST SERPL W P-5'-P-CCNC: 23 U/L (ref 10–35)
BASOPHILS # BLD AUTO: 0.1 10E3/UL (ref 0–0.2)
BASOPHILS NFR BLD AUTO: 1 %
BILIRUB SERPL-MCNC: 0.3 MG/DL
BUN SERPL-MCNC: 13 MG/DL (ref 6–20)
CALCIUM SERPL-MCNC: 9.6 MG/DL (ref 8.6–10)
CHLORIDE SERPL-SCNC: 103 MMOL/L (ref 98–107)
CREAT SERPL-MCNC: 0.8 MG/DL (ref 0.51–0.95)
DEPRECATED HCO3 PLAS-SCNC: 25 MMOL/L (ref 22–29)
EOSINOPHIL # BLD AUTO: 0.4 10E3/UL (ref 0–0.7)
EOSINOPHIL NFR BLD AUTO: 3 %
ERYTHROCYTE [DISTWIDTH] IN BLOOD BY AUTOMATED COUNT: 12.1 % (ref 10–15)
FERRITIN SERPL-MCNC: 71 NG/ML (ref 6–175)
FOLATE SERPL-MCNC: >20 NG/ML (ref 4.6–34.8)
GFR SERPL CREATININE-BSD FRML MDRD: >90 ML/MIN/1.73M2
GLUCOSE SERPL-MCNC: 85 MG/DL (ref 70–99)
HCT VFR BLD AUTO: 42.7 % (ref 35–47)
HGB BLD-MCNC: 14.8 G/DL (ref 11.7–15.7)
IMM GRANULOCYTES # BLD: 0 10E3/UL
IMM GRANULOCYTES NFR BLD: 0 %
IRON BINDING CAPACITY (ROCHE): 311 UG/DL (ref 240–430)
IRON SATN MFR SERPL: 28 % (ref 15–46)
IRON SERPL-MCNC: 87 UG/DL (ref 37–145)
LYMPHOCYTES # BLD AUTO: 3.4 10E3/UL (ref 0.8–5.3)
LYMPHOCYTES NFR BLD AUTO: 29 %
MAGNESIUM SERPL-MCNC: 2.2 MG/DL (ref 1.7–2.3)
MCH RBC QN AUTO: 30.6 PG (ref 26.5–33)
MCHC RBC AUTO-ENTMCNC: 34.7 G/DL (ref 31.5–36.5)
MCV RBC AUTO: 88 FL (ref 78–100)
MONOCYTES # BLD AUTO: 0.7 10E3/UL (ref 0–1.3)
MONOCYTES NFR BLD AUTO: 6 %
NEUTROPHILS # BLD AUTO: 7 10E3/UL (ref 1.6–8.3)
NEUTROPHILS NFR BLD AUTO: 61 %
NRBC # BLD AUTO: 0 10E3/UL
NRBC BLD AUTO-RTO: 0 /100
PLATELET # BLD AUTO: 393 10E3/UL (ref 150–450)
POTASSIUM SERPL-SCNC: 3.8 MMOL/L (ref 3.4–5.3)
PROT SERPL-MCNC: 8.1 G/DL (ref 6.4–8.3)
RBC # BLD AUTO: 4.84 10E6/UL (ref 3.8–5.2)
SODIUM SERPL-SCNC: 138 MMOL/L (ref 136–145)
TSH SERPL DL<=0.005 MIU/L-ACNC: 2.69 UIU/ML (ref 0.3–4.2)
VIT B12 SERPL-MCNC: 514 PG/ML (ref 232–1245)
WBC # BLD AUTO: 11.6 10E3/UL (ref 4–11)

## 2023-03-31 PROCEDURE — 83550 IRON BINDING TEST: CPT | Mod: ZL | Performed by: NURSE PRACTITIONER

## 2023-03-31 PROCEDURE — 85025 COMPLETE CBC W/AUTO DIFF WBC: CPT | Mod: ZL | Performed by: NURSE PRACTITIONER

## 2023-03-31 PROCEDURE — 36415 COLL VENOUS BLD VENIPUNCTURE: CPT | Mod: ZL | Performed by: NURSE PRACTITIONER

## 2023-03-31 PROCEDURE — 82728 ASSAY OF FERRITIN: CPT | Mod: ZL | Performed by: NURSE PRACTITIONER

## 2023-03-31 PROCEDURE — 82306 VITAMIN D 25 HYDROXY: CPT | Mod: ZL | Performed by: NURSE PRACTITIONER

## 2023-03-31 PROCEDURE — 99213 OFFICE O/P EST LOW 20 MIN: CPT | Mod: 25 | Performed by: NURSE PRACTITIONER

## 2023-03-31 PROCEDURE — 80053 COMPREHEN METABOLIC PANEL: CPT | Mod: ZL | Performed by: NURSE PRACTITIONER

## 2023-03-31 PROCEDURE — 84443 ASSAY THYROID STIM HORMONE: CPT | Mod: ZL | Performed by: NURSE PRACTITIONER

## 2023-03-31 PROCEDURE — 82607 VITAMIN B-12: CPT | Mod: ZL | Performed by: NURSE PRACTITIONER

## 2023-03-31 PROCEDURE — 83735 ASSAY OF MAGNESIUM: CPT | Mod: ZL | Performed by: NURSE PRACTITIONER

## 2023-03-31 PROCEDURE — 99395 PREV VISIT EST AGE 18-39: CPT | Performed by: NURSE PRACTITIONER

## 2023-03-31 PROCEDURE — 84630 ASSAY OF ZINC: CPT | Mod: ZL | Performed by: NURSE PRACTITIONER

## 2023-03-31 PROCEDURE — 82746 ASSAY OF FOLIC ACID SERUM: CPT | Mod: ZL | Performed by: NURSE PRACTITIONER

## 2023-03-31 RX ORDER — ALPRAZOLAM 0.5 MG
0.5 TABLET ORAL 3 TIMES DAILY PRN
Qty: 10 TABLET | Refills: 1 | Status: SHIPPED | OUTPATIENT
Start: 2023-03-31 | End: 2023-06-30

## 2023-03-31 RX ORDER — DEXTROAMPHETAMINE SACCHARATE, AMPHETAMINE ASPARTATE MONOHYDRATE, DEXTROAMPHETAMINE SULFATE AND AMPHETAMINE SULFATE 6.25; 6.25; 6.25; 6.25 MG/1; MG/1; MG/1; MG/1
25 CAPSULE, EXTENDED RELEASE ORAL DAILY
Qty: 30 CAPSULE | Refills: 0 | Status: SHIPPED | OUTPATIENT
Start: 2023-06-01 | End: 2023-06-30

## 2023-03-31 RX ORDER — DEXTROAMPHETAMINE SACCHARATE, AMPHETAMINE ASPARTATE MONOHYDRATE, DEXTROAMPHETAMINE SULFATE AND AMPHETAMINE SULFATE 6.25; 6.25; 6.25; 6.25 MG/1; MG/1; MG/1; MG/1
25 CAPSULE, EXTENDED RELEASE ORAL DAILY
Qty: 30 CAPSULE | Refills: 0 | Status: SHIPPED | OUTPATIENT
Start: 2023-05-01 | End: 2023-05-31

## 2023-03-31 RX ORDER — BUSPIRONE HYDROCHLORIDE 5 MG/1
TABLET ORAL
Qty: 90 TABLET | Refills: 3 | Status: SHIPPED | OUTPATIENT
Start: 2023-03-31 | End: 2024-01-11

## 2023-03-31 RX ORDER — DEXTROAMPHETAMINE SACCHARATE, AMPHETAMINE ASPARTATE MONOHYDRATE, DEXTROAMPHETAMINE SULFATE AND AMPHETAMINE SULFATE 6.25; 6.25; 6.25; 6.25 MG/1; MG/1; MG/1; MG/1
25 CAPSULE, EXTENDED RELEASE ORAL DAILY
Qty: 30 CAPSULE | Refills: 0 | Status: SHIPPED | OUTPATIENT
Start: 2023-03-31 | End: 2023-04-30

## 2023-03-31 RX ORDER — RIZATRIPTAN BENZOATE 10 MG/1
10 TABLET ORAL
Qty: 16 TABLET | Refills: 1 | Status: SHIPPED | OUTPATIENT
Start: 2023-03-31 | End: 2023-09-28

## 2023-03-31 RX ORDER — DEXTROAMPHETAMINE SACCHARATE, AMPHETAMINE ASPARTATE, DEXTROAMPHETAMINE SULFATE AND AMPHETAMINE SULFATE 2.5; 2.5; 2.5; 2.5 MG/1; MG/1; MG/1; MG/1
10 TABLET ORAL DAILY PRN
Qty: 30 TABLET | Refills: 0 | Status: SHIPPED | OUTPATIENT
Start: 2023-03-31 | End: 2023-06-30

## 2023-03-31 ASSESSMENT — ANXIETY QUESTIONNAIRES
4. TROUBLE RELAXING: MORE THAN HALF THE DAYS
GAD7 TOTAL SCORE: 16
2. NOT BEING ABLE TO STOP OR CONTROL WORRYING: MORE THAN HALF THE DAYS
8. IF YOU CHECKED OFF ANY PROBLEMS, HOW DIFFICULT HAVE THESE MADE IT FOR YOU TO DO YOUR WORK, TAKE CARE OF THINGS AT HOME, OR GET ALONG WITH OTHER PEOPLE?: SOMEWHAT DIFFICULT
3. WORRYING TOO MUCH ABOUT DIFFERENT THINGS: MORE THAN HALF THE DAYS
7. FEELING AFRAID AS IF SOMETHING AWFUL MIGHT HAPPEN: NEARLY EVERY DAY
6. BECOMING EASILY ANNOYED OR IRRITABLE: NEARLY EVERY DAY
1. FEELING NERVOUS, ANXIOUS, OR ON EDGE: MORE THAN HALF THE DAYS
5. BEING SO RESTLESS THAT IT IS HARD TO SIT STILL: MORE THAN HALF THE DAYS
IF YOU CHECKED OFF ANY PROBLEMS ON THIS QUESTIONNAIRE, HOW DIFFICULT HAVE THESE PROBLEMS MADE IT FOR YOU TO DO YOUR WORK, TAKE CARE OF THINGS AT HOME, OR GET ALONG WITH OTHER PEOPLE: SOMEWHAT DIFFICULT
GAD7 TOTAL SCORE: 16
7. FEELING AFRAID AS IF SOMETHING AWFUL MIGHT HAPPEN: NEARLY EVERY DAY
GAD7 TOTAL SCORE: 16

## 2023-03-31 ASSESSMENT — PATIENT HEALTH QUESTIONNAIRE - PHQ9
SUM OF ALL RESPONSES TO PHQ QUESTIONS 1-9: 7
SUM OF ALL RESPONSES TO PHQ QUESTIONS 1-9: 7
10. IF YOU CHECKED OFF ANY PROBLEMS, HOW DIFFICULT HAVE THESE PROBLEMS MADE IT FOR YOU TO DO YOUR WORK, TAKE CARE OF THINGS AT HOME, OR GET ALONG WITH OTHER PEOPLE: SOMEWHAT DIFFICULT

## 2023-03-31 ASSESSMENT — ENCOUNTER SYMPTOMS
PARESTHESIAS: 0
MYALGIAS: 0
FREQUENCY: 0
NAUSEA: 0
FEVER: 0
HEMATURIA: 0
DIZZINESS: 0
SHORTNESS OF BREATH: 0
ARTHRALGIAS: 0
ABDOMINAL PAIN: 0
CONSTIPATION: 0
HEARTBURN: 0
EYE PAIN: 0
HEMATOCHEZIA: 0
COUGH: 0
JOINT SWELLING: 0
DIARRHEA: 0
BREAST MASS: 0
DYSURIA: 0
NERVOUS/ANXIOUS: 1
HEADACHES: 1
WEAKNESS: 0
CHILLS: 0
PALPITATIONS: 0
SORE THROAT: 0

## 2023-03-31 ASSESSMENT — PAIN SCALES - GENERAL: PAINLEVEL: MILD PAIN (2)

## 2023-03-31 NOTE — PROGRESS NOTES
SUBJECTIVE:   CC: Nicky is an 27 year old who presents for preventive health visit.   No flowsheet data found.  Healthy Habits:     Getting at least 3 servings of Calcium per day:  Yes    Bi-annual eye exam:  NO    Dental care twice a year:  Yes    Sleep apnea or symptoms of sleep apnea:  Daytime drowsiness    Diet:  Regular (no restrictions)    Frequency of exercise:  None    Taking medications regularly:  No    Barriers to taking medications:  Problems remembering to take them and Other    Medication side effects:  Other    PHQ-2 Total Score: 0    Additional concerns today:  Yes    She comes in today for annual physical.  She has been struggling with her mental health including depression, anxiety and ADHD.  She has missed some appointments, forgetting to take medications.  She has used Xanax 1 time to see how she felt but does not feel that she needs at this.  She is using buspirone 5 mg as needed few times a week.  She does not have any side effects but does report some relief in her anxiety.  Has not been taking fluoxetine consistently.  She had been using Adderall daily as needed but missed an appointment so she has not had this consistently.  She does report some frequent and intermittent headaches.  She is losing her health insurance in April and wants to have a work-up completed today.  She was using Relpax but this is not covered by her health insurance.  Topiramate helped but she lost a lot of weight and it caused toe numbness, Imitrex had side effects that she did not like.    Today's PHQ-2 Score:   PHQ-2 ( 1999 Pfizer) 3/31/2023   Q1: Little interest or pleasure in doing things 0   Q2: Feeling down, depressed or hopeless 0   PHQ-2 Score 0   PHQ-2 Total Score (12-17 Years)- Positive if 3 or more points; Administer PHQ-A if positive -   Q1: Little interest or pleasure in doing things Not at all   Q2: Feeling down, depressed or hopeless Not at all   PHQ-2 Score 0         Social History     Tobacco Use      Smoking status: Never     Smokeless tobacco: Never   Substance Use Topics     Alcohol use: Not Currently     Comment: rare - 3 times per year         Alcohol Use 3/31/2023   Prescreen: >3 drinks/day or >7 drinks/week? Not Applicable     Reviewed orders with patient.  Reviewed health maintenance and updated orders accordingly - Yes  BP Readings from Last 3 Encounters:   03/31/23 108/76   01/26/23 110/74   12/04/22 112/74    Wt Readings from Last 3 Encounters:   03/31/23 79.4 kg (175 lb)   01/26/23 78.9 kg (174 lb)   12/04/22 77 kg (169 lb 12.8 oz)                  Patient Active Problem List   Diagnosis     Allergic rhinitis due to pollen     Depression with anxiety     Dysmenorrhea     Idiopathic scoliosis     Anterior shin splints     Plantar warts     Adjustment disorder with mixed anxiety and depressed mood     Borderline personality disorder (H)     Past Surgical History:   Procedure Laterality Date     DILATION AND CURETTAGE N/A 05/17/2017    Dr. Hazel; [facility] - retained placenta/endometritis       Social History     Tobacco Use     Smoking status: Never     Smokeless tobacco: Never   Vaping Use     Vaping status: Never Used   Substance Use Topics     Alcohol use: Not Currently     Comment: rare - 3 times per year     Family History   Problem Relation Age of Onset     GERD Mother      GERD Maternal Grandmother      Ulcerative Colitis Maternal Grandfather          Current Outpatient Medications   Medication Sig Dispense Refill     ALPRAZolam (XANAX) 0.5 MG tablet Take 1 tablet (0.5 mg) by mouth 3 times daily as needed for anxiety 10 tablet 1     amphetamine-dextroamphetamine (ADDERALL XR) 25 MG 24 hr capsule Take 1 capsule (25 mg) by mouth daily for 30 days 30 capsule 0     [START ON 5/1/2023] amphetamine-dextroamphetamine (ADDERALL XR) 25 MG 24 hr capsule Take 1 capsule (25 mg) by mouth daily for 30 days 30 capsule 0     [START ON 6/1/2023] amphetamine-dextroamphetamine (ADDERALL XR) 25 MG 24 hr capsule Take  1 capsule (25 mg) by mouth daily for 30 days 30 capsule 0     amphetamine-dextroamphetamine (ADDERALL) 10 MG tablet Take 1 tablet (10 mg) by mouth daily as needed (adhd) 30 tablet 0     busPIRone (BUSPAR) 5 MG tablet Take 1-3 tablets daily as needed 90 tablet 3     EPINEPHrine (ANY BX GENERIC EQUIV) 0.3 MG/0.3ML injection 2-pack Inject 0.3 mLs (0.3 mg) into the muscle once as needed for anaphylaxis (Repeat in 5-15 minutes if needed) for allergic reaction for up to 1 dose. 2 each 11     FLUoxetine (PROZAC) 20 MG capsule Take 1 capsule (20 mg) by mouth daily 90 capsule 3     rimegepant (NURTEC) 75 MG ODT tablet Take 1 tablet every other day-may use 1 tablet in between as needed for migraines 30 tablet 1     rizatriptan (MAXALT) 10 MG tablet Take 1 tablet (10 mg) by mouth at onset of headache for migraine May repeat in 2 hours. Max 3 tablets/24 hours. 16 tablet 1     triamcinolone (KENALOG) 0.1 % external cream Apply topically 2 times daily as needed for irritation 80 g 1     Allergies   Allergen Reactions     Bermuda Grass Itching     Dust Mite Extract Itching     Mold Itching     Pollen Extract Itching     Tree Nuts  [Nuts] Itching     Augmentin Diarrhea       Breast Cancer Screening:    Breast CA Risk Assessment (FHS-7) 3/31/2023   Do you have a family history of breast, colon, or ovarian cancer? No / Unknown         Patient under 40 years of age: Routine Mammogram Screening not recommended.   Pertinent mammograms are reviewed under the imaging tab.    History of abnormal Pap smear: NO - age 21-29 PAP every 3 years recommended  PAP / HPV 3/17/2021   PAP (Historical) NIL     Reviewed and updated as needed this visit by clinical staff   Tobacco  Allergies  Meds              Reviewed and updated as needed this visit by Provider                 Past Medical History:   Diagnosis Date     Chronic cough 09/19/2002    resolved     Closed fracture of lower end of radius 09/2006    L arm     History of early menarche  2007     Preeclampsia 2019     Pregnancy          Subacute endomyometritis 2017      Past Surgical History:   Procedure Laterality Date     DILATION AND CURETTAGE N/A 2017    Dr. Hazel; [facility] - retained placenta/endometritis       Review of Systems   Constitutional: Negative for chills and fever.   HENT: Negative for congestion, ear pain, hearing loss and sore throat.    Eyes: Negative for pain and visual disturbance.   Respiratory: Negative for cough and shortness of breath.    Cardiovascular: Negative for chest pain, palpitations and peripheral edema.   Gastrointestinal: Negative for abdominal pain, constipation, diarrhea, heartburn, hematochezia and nausea.   Breasts:  Negative for tenderness, breast mass and discharge.   Genitourinary: Positive for vaginal bleeding. Negative for dysuria, frequency, genital sores, hematuria, pelvic pain, urgency and vaginal discharge.   Musculoskeletal: Negative for arthralgias, joint swelling and myalgias.   Skin: Negative for rash.   Neurological: Positive for headaches. Negative for dizziness, weakness and paresthesias.   Psychiatric/Behavioral: Positive for mood changes. The patient is nervous/anxious.           OBJECTIVE:   LMP 2023 (Exact Date)   Physical Exam  GENERAL: healthy, alert and no distress  EYES: Eyes grossly normal to inspection, PERRL and conjunctivae and sclerae normal  HENT: ear canals and TM's normal, nose and mouth without ulcers or lesions  NECK: no adenopathy, no asymmetry, masses, or scars and thyroid normal to palpation  RESP: lungs clear to auscultation - no rales, rhonchi or wheezes  CV: regular rate and rhythm, normal S1 S2, no S3 or S4, no murmur, click or rub, no peripheral edema and peripheral pulses strong  ABDOMEN: soft, nontender, no hepatosplenomegaly, no masses and bowel sounds normal  MS: no gross musculoskeletal defects noted, no edema  SKIN: no suspicious lesions or rashes  NEURO: Normal strength and tone,  mentation intact and speech normal  PSYCH: mentation appears normal, affect normal/bright    Diagnostic Test Results:  Labs reviewed in Epic  Results for orders placed or performed in visit on 03/31/23   TSH Reflex GH     Status: Normal   Result Value Ref Range    TSH 2.69 0.30 - 4.20 uIU/mL   Comprehensive Metabolic Panel     Status: Normal   Result Value Ref Range    Sodium 138 136 - 145 mmol/L    Potassium 3.8 3.4 - 5.3 mmol/L    Chloride 103 98 - 107 mmol/L    Carbon Dioxide (CO2) 25 22 - 29 mmol/L    Anion Gap 10 7 - 15 mmol/L    Urea Nitrogen 13.0 6.0 - 20.0 mg/dL    Creatinine 0.80 0.51 - 0.95 mg/dL    Calcium 9.6 8.6 - 10.0 mg/dL    Glucose 85 70 - 99 mg/dL    Alkaline Phosphatase 97 35 - 104 U/L    AST 23 10 - 35 U/L    ALT 29 10 - 35 U/L    Protein Total 8.1 6.4 - 8.3 g/dL    Albumin 4.7 3.5 - 5.2 g/dL    Bilirubin Total 0.3 <=1.2 mg/dL    GFR Estimate >90 >60 mL/min/1.73m2   Vitamin D Total     Status: Normal   Result Value Ref Range    Vitamin D, Total (25-Hydroxy) 35 20 - 75 ug/L    Narrative    Season, race, dietary intake, and treatment affect the concentration of 25-hydroxy-Vitamin D. Values may decrease during winter months and increase during summer months. Values 20-29 ug/L may indicate Vitamin D insufficiency and values <20 ug/L may indicate Vitamin D deficiency.    Vitamin D determination is routinely performed by an immunoassay specific for 25 hydroxyvitamin D3.  If an individual is on vitamin D2(ergocalciferol) supplementation, please specify 25 OH vitamin D2 and D3 level determination by LCMSMS test [id number].     Vitamin B12     Status: Normal   Result Value Ref Range    Vitamin B12 514 232 - 1,245 pg/mL   Folic Acid     Status: Normal   Result Value Ref Range    Folic Acid >20.0 4.6 - 34.8 ng/mL   Iron & Iron Binding Capacity     Status: Normal   Result Value Ref Range    Iron 87 37 - 145 ug/dL    Iron Binding Capacity 311 240 - 430 ug/dL    Iron Sat Index 28 15 - 46 %   Ferritin     Status:  Normal   Result Value Ref Range    Ferritin 71 6 - 175 ng/mL   Magnesium     Status: Normal   Result Value Ref Range    Magnesium 2.2 1.7 - 2.3 mg/dL   Zinc     Status: None   Result Value Ref Range    Zinc, Serum/Plasma 84.8 60.0 - 120.0 ug/dL   CBC with platelets and differential     Status: Abnormal   Result Value Ref Range    WBC Count 11.6 (H) 4.0 - 11.0 10e3/uL    RBC Count 4.84 3.80 - 5.20 10e6/uL    Hemoglobin 14.8 11.7 - 15.7 g/dL    Hematocrit 42.7 35.0 - 47.0 %    MCV 88 78 - 100 fL    MCH 30.6 26.5 - 33.0 pg    MCHC 34.7 31.5 - 36.5 g/dL    RDW 12.1 10.0 - 15.0 %    Platelet Count 393 150 - 450 10e3/uL    % Neutrophils 61 %    % Lymphocytes 29 %    % Monocytes 6 %    % Eosinophils 3 %    % Basophils 1 %    % Immature Granulocytes 0 %    NRBCs per 100 WBC 0 <1 /100    Absolute Neutrophils 7.0 1.6 - 8.3 10e3/uL    Absolute Lymphocytes 3.4 0.8 - 5.3 10e3/uL    Absolute Monocytes 0.7 0.0 - 1.3 10e3/uL    Absolute Eosinophils 0.4 0.0 - 0.7 10e3/uL    Absolute Basophils 0.1 0.0 - 0.2 10e3/uL    Absolute Immature Granulocytes 0.0 <=0.4 10e3/uL    Absolute NRBCs 0.0 10e3/uL   CBC and Differential     Status: Abnormal    Narrative    The following orders were created for panel order CBC and Differential.  Procedure                               Abnormality         Status                     ---------                               -----------         ------                     CBC with platelets and d...[[id number]]  Abnormal            Final result                 Please view results for these tests on the individual orders.       ASSESSMENT/PLAN:       ICD-10-CM    1. Routine general medical examination at a health care facility  Z00.00       2. Anxiety  F41.9 CBC and Differential     Vitamin D Total     Folic Acid     Iron & Iron Binding Capacity     Ferritin     Magnesium     Zinc     ALPRAZolam (XANAX) 0.5 MG tablet     CBC and Differential     Vitamin D Total     Folic Acid     Iron & Iron Binding Capacity      Ferritin     Magnesium     Zinc      3. Adjustment disorder with mixed anxiety and depressed mood  F43.23 CBC and Differential     Vitamin D Total     busPIRone (BUSPAR) 5 MG tablet     FLUoxetine (PROZAC) 20 MG capsule     CBC and Differential     Vitamin D Total      4. Borderline personality disorder (H)  F60.3       5. Depression with anxiety  F41.8 CBC and Differential     Vitamin D Total     Vitamin B12     CBC and Differential     Vitamin D Total     Vitamin B12      6. Other migraine without status migrainosus, not intractable  G43.809 TSH Reflex GH     Comprehensive Metabolic Panel     CBC and Differential     Magnesium     Zinc     rizatriptan (MAXALT) 10 MG tablet     rimegepant (NURTEC) 75 MG ODT tablet     TSH Reflex GH     Comprehensive Metabolic Panel     CBC and Differential     Magnesium     Zinc     MR Brain w/o & w Contrast     CANCELED: MR Brain w/o Contrast      7. Screening for thyroid disorder  Z13.29 TSH Reflex GH     TSH Reflex GH      8. Attention deficit hyperactivity disorder (ADHD), combined type  F90.2 amphetamine-dextroamphetamine (ADDERALL XR) 25 MG 24 hr capsule     amphetamine-dextroamphetamine (ADDERALL XR) 25 MG 24 hr capsule     amphetamine-dextroamphetamine (ADDERALL XR) 25 MG 24 hr capsule     amphetamine-dextroamphetamine (ADDERALL) 10 MG tablet        [place]  was reviewed.  Adderall XR 25 mg daily was ordered, immediate release Adderall 10 mg to be used as needed.  Labs completed as noted above.  Maxalt ordered, she will pick this up if Nurtec is not covered.  If Nurtec is covered then I like her to take this every other day and she can have opposite days to use as needed for abortive treatment.  MRI brain ordered.  Refilled Xanax.  We will ordered fluoxetine.  She will need to follow-up in 3 months, sooner if she is having concerns.    Patient has been advised of split billing requirements and indicates understanding: Yes      COUNSELING:  Reviewed preventive health  "counseling, as reflected in patient instructions       Regular exercise       Healthy diet/nutrition       Vision screening      BMI:   Estimated body mass index is 29.18 kg/m  as calculated from the following:    Height as of 1/26/23: 1.645 m (5' 4.75\").    Weight as of 1/26/23: 78.9 kg (174 lb).   Weight management plan: Discussed healthy diet and exercise guidelines      She reports that she has never smoked. She has never used smokeless tobacco.      MIGDALIA Tan [facility]  "

## 2023-03-31 NOTE — NURSING NOTE
Patient presents today for physical and follow up on depression and anxiety.    Medication Reconciliation Complete    Ai Barth LPN  3/31/2023 3:06 PM

## 2023-04-03 LAB — DEPRECATED CALCIDIOL+CALCIFEROL SERPL-MC: 35 UG/L (ref 20–75)

## 2023-04-04 LAB — ZINC SERPL-MCNC: 84.8 UG/DL

## 2023-04-24 ENCOUNTER — HOSPITAL ENCOUNTER (OUTPATIENT)
Dept: MRI IMAGING | Facility: OTHER | Age: 28
Discharge: HOME | End: 2023-04-24
Attending: NURSE PRACTITIONER | Admitting: NURSE PRACTITIONER
Payer: COMMERCIAL

## 2023-04-24 DIAGNOSIS — G43.809 OTHER MIGRAINE WITHOUT STATUS MIGRAINOSUS, NOT INTRACTABLE: ICD-10-CM

## 2023-04-24 PROCEDURE — 255N000002 HC RX 255 OP 636: Performed by: NURSE PRACTITIONER

## 2023-04-24 PROCEDURE — 70553 MRI BRAIN STEM W/O & W/DYE: CPT

## 2023-04-24 PROCEDURE — A9575 INJ GADOTERATE MEGLUMI 0.1ML: HCPCS | Performed by: NURSE PRACTITIONER

## 2023-04-24 RX ORDER — GADOTERATE MEGLUMINE 376.9 MG/ML
15 INJECTION INTRAVENOUS ONCE
Status: COMPLETED | OUTPATIENT
Start: 2023-04-24 | End: 2023-04-24

## 2023-04-24 RX ADMIN — GADOTERATE MEGLUMINE 15 ML: 376.9 INJECTION INTRAVENOUS at 18:55

## 2023-05-06 ENCOUNTER — OFFICE VISIT (OUTPATIENT)
Dept: FAMILY MEDICINE | Facility: OTHER | Age: 28
End: 2023-05-06
Payer: COMMERCIAL

## 2023-05-06 VITALS
BODY MASS INDEX: 29.85 KG/M2 | RESPIRATION RATE: 18 BRPM | WEIGHT: 178 LBS | TEMPERATURE: 97 F | HEART RATE: 102 BPM | DIASTOLIC BLOOD PRESSURE: 70 MMHG | SYSTOLIC BLOOD PRESSURE: 120 MMHG | OXYGEN SATURATION: 98 %

## 2023-05-06 DIAGNOSIS — H65.91 RIGHT NON-SUPPURATIVE OTITIS MEDIA: Primary | ICD-10-CM

## 2023-05-06 DIAGNOSIS — J02.9 SORE THROAT: ICD-10-CM

## 2023-05-06 DIAGNOSIS — J06.9 VIRAL URI WITH COUGH: ICD-10-CM

## 2023-05-06 DIAGNOSIS — R05.1 ACUTE COUGH: ICD-10-CM

## 2023-05-06 LAB
FLUAV RNA SPEC QL NAA+PROBE: NEGATIVE
FLUBV RNA RESP QL NAA+PROBE: NEGATIVE
GROUP A STREP BY PCR: NOT DETECTED
RSV RNA SPEC NAA+PROBE: NEGATIVE
SARS-COV-2 RNA RESP QL NAA+PROBE: NEGATIVE

## 2023-05-06 PROCEDURE — C9803 HOPD COVID-19 SPEC COLLECT: HCPCS

## 2023-05-06 PROCEDURE — 87637 SARSCOV2&INF A&B&RSV AMP PRB: CPT | Mod: ZL

## 2023-05-06 PROCEDURE — 99213 OFFICE O/P EST LOW 20 MIN: CPT | Mod: CS

## 2023-05-06 PROCEDURE — 87651 STREP A DNA AMP PROBE: CPT | Mod: ZL

## 2023-05-06 RX ORDER — BENZONATATE 100 MG/1
100 CAPSULE ORAL 3 TIMES DAILY PRN
Qty: 30 CAPSULE | Refills: 0 | Status: SHIPPED | OUTPATIENT
Start: 2023-05-06 | End: 2023-12-14

## 2023-05-06 RX ORDER — CEFDINIR 300 MG/1
300 CAPSULE ORAL 2 TIMES DAILY
Qty: 14 CAPSULE | Refills: 0 | Status: SHIPPED | OUTPATIENT
Start: 2023-05-06 | End: 2023-05-13

## 2023-05-06 ASSESSMENT — PAIN SCALES - GENERAL: PAINLEVEL: MODERATE PAIN (5)

## 2023-05-06 ASSESSMENT — PATIENT HEALTH QUESTIONNAIRE - PHQ9: SUM OF ALL RESPONSES TO PHQ QUESTIONS 1-9: 14

## 2023-05-06 NOTE — NURSING NOTE
Ptt states that her cough started 4-5 days ago.  Whole family is getting over bronchitis.  States both ears hurt

## 2023-05-06 NOTE — PATIENT INSTRUCTIONS
"If a strep test was performed:   We will call you with the results of the strep test, in the meantime, information below on viral colds/upper respiratory tract infections were provided (on average the test takes about 30-60 minutes to return).    If the strep test returns \"POSITIVE\" for strep, you will be prescribed an antibiotic, if this is the case, please take the entire course of antibiotic, even if feeling better prior to this. Continue with symptomatic treatment below as needed. If positive, please change toothbrush on day 2.     If the strep test returns \"NEGATIVE\" you do not need antibiotics and are to continue with conservative treatment as outlined below. Continue to monitor symptoms.       If a COVID swab was performed:   Please quarantine until results return which takes 24-72 hours, unless informed otherwise.     If COVID testing is negative, symptoms are improving (no need for antipyretics/fever reducers, etc.), that patient can return to normal activities of daily living.    If you test positive for COVID (regardless of vaccination status): stay home for 5 days. If you have no symptoms or symptoms are resolving after 5 days, you may leave the house per CDC guidelines. Continue to wear a mask around others for 5 days. You should also be fever free for 24 hours without the use of fever reducers (Tylenol/Ibuprofen).     If Influenza positive, 5-day quarantine from symptom onset and fever free for 24 hours (in addition to this). Follow school/employer guidelines     If RSV positive, follow school/employer guideline + fever free for 24 hours.     Please refer to your AVS for follow up and pain/symptoms management recommendations (I.e.: medications, helpful conservative treatment modalities, appropriate follow up if need to a specialist or family practice, etc.). Please return to urgent care if your symptoms change or worsen.     Discharge instructions:  -If you were prescribed a medication(s), please take " this as prescribed/directed  -Monitor your symptoms, if changing/worsening, return to UC/ER or PCP for follow up    Symptomatic treatments recommended.  - Antibiotics will not help with your symptoms, unless you were told otherwise today (strep throat, ear infection, etc. ). Education provided on symptoms of secondary bacterial infection such as new fever, chills, rigors, shortness of breath, increased work of breathing, that can occur with viral URI and need for further evaluation, if they occur.   - Ensure you are staying hydrated by drinking plenty of fluids and eating mild foods and advance diet as tolerated  - Honey can be soothing for sore throat (as long as above 12 months of age)  - Warm salt water gurgles can help soothe sore throat  - Humidifier can help with congestion and help keep mucus membranes such as throat and nose from drying out.  - Sleeping slightly propped up can help with congestion and postnasal drainage that can worsen cough at bedtime.  - As long as you have never been told to take Tylenol and/or Ibuprofen you can use them to manage fever and body aches per package instructions  Make sure you eat when you take ibuprofen to avoid stomach upset.  - OTC cough medications per package instructions to help with cough. Check to see if the cough/cold medication already has acetaminophen (Tylenol) in it. If it does avoid taking additional Tylenol.  - If sudden onset of new fever, worsening symptoms return for further evaluation.  - OTC antihistamine such as Allegra, Zyrtec, Claritin (generic is okay) can help with nasal/sinus congestion and OTC nasal steroid such as Flonase can help decrease sinus inflammation to help with congestion.  - Education provided on symptoms of post-viral bacterial infections including ear infection and pneumonia. This would require re-evaluation for treatment.    Please refer to your AVS for follow up and pain/symptoms management recommendations (I.e.: medications,  "helpful conservative treatment modalities, appropriate follow up if need to a specialist or family practice, etc.). Please return to urgent care if your symptoms change or worsen.     Discharge instructions:  -If you were prescribed a medication(s), please take this as prescribed/directed  -Monitor your symptoms, if changing/worsening, return to UC/ER or PCP for follow up    - For ear infection. Take entire course of antibiotics to ensure this clears (even if feeling better).  - Tylenol or ibuprofen for pain and fevers.   - Eat yogurt, kefir or take over-the-counter \"probiotic\" at least 2 hours before or after a dose of antibiotic. This will replace good bacteria that may have been lost due to the antibiotic. (This may also help to prevent yeast infections and upset stomach during the course of antibiotic.)  - In the future at onset of congestion: Blow nose or use bulb syringe to keep nasal congestion cleared and use saline nasal spray/flush.  -Alternative ibuprofen and tylenol as needed.   -Rest/relaxation and keeping hydrated with clear liquids (ie: water or gatorade). Using a humidifier may be beneficial as well.     * Recheck with family practice as needed or ER sooner with worsening or concerns.       "

## 2023-05-06 NOTE — PROGRESS NOTES
ASSESSMENT/PLAN:    I have reviewed the nursing notes.  I have reviewed the findings, diagnosis, plan and need for follow up with the patient.    1. Right non-suppurative otitis media  - cefdinir (OMNICEF) 300 MG capsule; Take 1 capsule (300 mg) by mouth 2 times daily for 7 days  Dispense: 14 capsule; Refill: 0    Physical exam and symptoms consistent with right otitis media.  Will treat with Omnicef twice a day for 7 days.  Advised patient that she can take Tylenol or ibuprofen as needed for ear pain.    2. Viral URI with cough  3. Sore throat  4. Acute cough  - Symptomatic Influenza A/B, RSV, & SARS-CoV2 PCR (COVID-19) Nose  - Group A Streptococcus PCR Throat Swab  - benzonatate (TESSALON) 100 MG capsule; Take 1 capsule (100 mg) by mouth 3 times daily as needed for cough  Dispense: 30 capsule; Refill: 0    Patient presents with upper respiratory symptoms.  Her vitals are stable and she appears nontoxic.  Her multiplex and strep test were both negative and she was notified of the results.  We will treat patient's cough with Tessalon Perles as needed. Discussed symptomatic treatment - Encouraged fluids, salt water gargles, honey, elevation, humidifier, sinus rinse/netti pot, lozenges, tea, topical vapor rub, popsicles, rest, etc. May use over-the-counter Tylenol or ibuprofen PRN.    Discussed warning signs/symptoms indicative of need to f/u    Follow up if symptoms persist or worsen or concerns    I explained my diagnostic considerations and recommendations to the patient, who voiced understanding and agreement with the treatment plan. All questions were answered. We discussed potential side effects of any prescribed or recommended therapies, as well as expectations for response to treatments.    Dale Millan, MIGDALIA CNP  5/6/2023  9:19 AM    HPI:    Nicky Hart is a 28 year old female  who presents to [facility] today for concerns of URI symptoms    URI, x 5 days    Symptoms:  No fevers or chills.   YES: +  sore throat/pharyngitis/tonsillitis.   YES: + allergy/URI Symptoms  YES: + muffled sounds/change in hearing  YES: + sensation of fullness in ear(s)  No ringing in ears/tinnitus  No balance changes  No dizziness  YES: + congestion (head/nasal/chest)  YES: + cough/productive cough  YES: + post nasal drip   YES: + headache  No sinus pain/pressure  No myalgias  YES: + otalgia  No rash  Activity Level Changes: Yes: increased fatigue  Appetite/Liquid Intake Changes: No  Changes to Bowel Habits: No  Changes to Bladder Habits: No  Additional Symptoms to Report: No  History of similar symptoms: No  Prior workup: No    Treatments tried: Tylenol/Ibuprofen, Fluids and Rest    Site of exposure: home to   Type of exposure: influenza    Other Pertinent History: none    Allergies: reviewed    PCP: Luiza Murillo    Past Medical History:   Diagnosis Date     Chronic cough 2002    resolved     Closed fracture of lower end of radius 2006    L arm     History of early menarche 2007     Preeclampsia 2019     Pregnancy          Subacute endomyometritis 2017     Past Surgical History:   Procedure Laterality Date     DILATION AND CURETTAGE N/A 2017    Dr. Hazel; [facility] - retained placenta/endometritis     Social History     Tobacco Use     Smoking status: Never     Smokeless tobacco: Never   Vaping Use     Vaping status: Never Used   Substance Use Topics     Alcohol use: Not Currently     Comment: rare - 3 times per year     Current Outpatient Medications   Medication Sig Dispense Refill     ALPRAZolam (XANAX) 0.5 MG tablet Take 1 tablet (0.5 mg) by mouth 3 times daily as needed for anxiety 10 tablet 1     amphetamine-dextroamphetamine (ADDERALL XR) 25 MG 24 hr capsule Take 1 capsule (25 mg) by mouth daily for 30 days 30 capsule 0     [START ON 2023] amphetamine-dextroamphetamine (ADDERALL XR) 25 MG 24 hr capsule Take 1 capsule (25 mg) by mouth daily for 30 days 30 capsule 0      amphetamine-dextroamphetamine (ADDERALL) 10 MG tablet Take 1 tablet (10 mg) by mouth daily as needed (adhd) 30 tablet 0     busPIRone (BUSPAR) 5 MG tablet Take 1-3 tablets daily as needed 90 tablet 3     EPINEPHrine (ANY BX GENERIC EQUIV) 0.3 MG/0.3ML injection 2-pack Inject 0.3 mLs (0.3 mg) into the muscle once as needed for anaphylaxis (Repeat in 5-15 minutes if needed) for allergic reaction for up to 1 dose. 2 each 11     FLUoxetine (PROZAC) 20 MG capsule Take 1 capsule (20 mg) by mouth daily 90 capsule 3     rimegepant (NURTEC) 75 MG ODT tablet Take 1 tablet every other day-may use 1 tablet in between as needed for migraines 30 tablet 1     rizatriptan (MAXALT) 10 MG tablet Take 1 tablet (10 mg) by mouth at onset of headache for migraine May repeat in 2 hours. Max 3 tablets/24 hours. 16 tablet 1     triamcinolone (KENALOG) 0.1 % external cream Apply topically 2 times daily as needed for irritation 80 g 1     Allergies   Allergen Reactions     Bermuda Grass Extract Itching     Dust Mite Extract Itching     Mold Itching     Pollen Extract Itching     Tree Nuts  [Nuts] Itching     Amoxicillin-Pot Clavulanate Diarrhea     Past medical history, past surgical history, current medications and allergies reviewed and accurate to the best of my knowledge.      ROS:  Refer to HPI    /70 (BP Location: Right arm, Patient Position: Sitting, Cuff Size: Adult Regular)   Pulse 102   Temp 97  F (36.1  C) (Temporal)   Resp 18   Wt 80.7 kg (178 lb)   LMP 03/27/2023 (Exact Date)   SpO2 98%   BMI 29.85 kg/m      EXAM:  General Appearance: Well appearing 28 year old female, appropriate appearance for age. No acute distress   Ears: Left TM intact, translucent with bony landmarks appreciated, no erythema, no effusion, no bulging, no purulence.  Right TM intact, moderate erythema, effusion, and bulging, no purulence.  Left auditory canal clear.  Right auditory canal clear.  Normal external ears, non tender.  Eyes:  conjunctivae normal without erythema or irritation, corneas clear, no drainage or crusting, no eyelid swelling, pupils equal   Oropharynx: moist mucous membranes, posterior pharynx without erythema, tonsils symmetric and 1+, no erythema, no exudates or petechiae, no post nasal drip seen, no trismus, voice clear.    Sinuses:  No sinus tenderness upon palpation of the frontal or maxillary sinuses  Nose:  Bilateral nares: no erythema, no edema, purulent drainage and congestion   Neck: supple without adenopathy  Respiratory: normal chest wall and respirations.  Normal effort.  Clear to auscultation bilaterally, no wheezing, crackles or rhonchi.  No increased work of breathing.  No cough appreciated.  Cardiac: RRR with no murmurs  Musculoskeletal:  Equal movement of bilateral upper extremities.  Equal movement of bilateral lower extremities.  Normal gait.    Dermatological: no rashes noted of exposed skin  Neuro: Alert and oriented to person, place, and time.    Psychological: normal affect, alert, oriented, and pleasant.     Labs:  Results for orders placed or performed in visit on 05/06/23   Symptomatic Influenza A/B, RSV, & SARS-CoV2 PCR (COVID-19) Nose     Status: Normal    Specimen: Nose; Swab   Result Value Ref Range    Influenza A PCR Negative Negative    Influenza B PCR Negative Negative    RSV PCR Negative Negative    SARS CoV2 PCR Negative Negative    Narrative    Testing was performed using the Xpert Xpress CoV2/Flu/RSV Assay on the [employer] GeneXpert Instrument. This test should be ordered for the detection of SARS-CoV-2, influenza, and RSV viruses in individuals who meet clinical and/or epidemiological criteria. Test performance is unknown in asymptomatic patients. This test is for in vitro diagnostic use under the FDA EUA for laboratories certified under CLIA to perform high or moderate complexity testing. This test has not been FDA cleared or approved. A negative result does not rule out the presence of PCR  inhibitors in the specimen or target RNA in concentration below the limit of detection for the assay. If only one viral target is positive but coinfection with multiple targets is suspected, the sample should be re-tested with another FDA cleared, approved, or authorized test, if coinfection would change clinical management. This test was validated by the [facility] ([employer]). These laboratories are certified under the Clinical Laboratory Improvement Amendments of 1988 (CLIA-88) as qualified to perform high complexity laboratory testing.   Group A Streptococcus PCR Throat Swab     Status: Normal    Specimen: Throat; Swab   Result Value Ref Range    Group A strep by PCR Not Detected Not Detected    Narrative    The Xpert Xpress Strep A test, performed on the [employer], is a rapid, qualitative in vitro diagnostic test for the detection of Streptococcus pyogenes (Group A ß-hemolytic Streptococcus, Strep A) in throat swab specimens from patients with signs and symptoms of pharyngitis. The Xpert Xpress Strep A test can be used as an aid in the diagnosis of Group A Streptococcal pharyngitis. The assay is not intended to monitor treatment for Group A Streptococcus infections. The Xpert Xpress Strep A test utilizes an automated real-time polymerase chain reaction (PCR) to detect Streptococcus pyogenes DNA.

## 2023-05-26 ENCOUNTER — OFFICE VISIT (OUTPATIENT)
Dept: FAMILY MEDICINE | Facility: OTHER | Age: 28
End: 2023-05-26
Payer: COMMERCIAL

## 2023-05-26 VITALS
SYSTOLIC BLOOD PRESSURE: 110 MMHG | DIASTOLIC BLOOD PRESSURE: 70 MMHG | BODY MASS INDEX: 29.66 KG/M2 | TEMPERATURE: 98.9 F | HEIGHT: 65 IN | OXYGEN SATURATION: 99 % | WEIGHT: 178 LBS | RESPIRATION RATE: 16 BRPM | HEART RATE: 90 BPM

## 2023-05-26 DIAGNOSIS — J01.00 ACUTE NON-RECURRENT MAXILLARY SINUSITIS: Primary | ICD-10-CM

## 2023-05-26 PROCEDURE — 99213 OFFICE O/P EST LOW 20 MIN: CPT | Performed by: NURSE PRACTITIONER

## 2023-05-26 RX ORDER — AZITHROMYCIN 250 MG/1
TABLET, FILM COATED ORAL
Qty: 6 TABLET | Refills: 0 | Status: SHIPPED | OUTPATIENT
Start: 2023-05-26 | End: 2023-05-31

## 2023-05-26 ASSESSMENT — PAIN SCALES - GENERAL: PAINLEVEL: MODERATE PAIN (4)

## 2023-05-26 NOTE — NURSING NOTE
Chief Complaint   Patient presents with     Cough     Ear Problem     Bilateral pain      patient presents to the clinic for bilateral ear pain, cough, congestion, and fever (101.8)    patient was just treated for ear infection about 2 weeks ago     Georgia Dalal LPN       FOOD SECURITY SCREENING QUESTIONS:    The next two questions are to help us understand your food security.  If you are feeling you need any assistance in this area, we have resources available to support you today.    Hunger Vital Signs:  Within the past 12 months we worried whether our food would run out before we got money to buy more. Never  Within the past 12 months the food we bought just didn't last and we didn't have money to get more. Never  Georgia Dalal LPN,LPN on 5/26/2023 at 10:54 AM    Food Insecurity: Not on file

## 2023-05-26 NOTE — PROGRESS NOTES
ASSESSMENT/PLAN:     I have reviewed the nursing notes.  I have reviewed the findings, diagnosis, plan and need for follow up with the patient.    1. Acute non-recurrent maxillary sinusitis  With evidence of double sickening over past 2 weeks.   Symptoms/history and exam consistent with maxillary sinusitis. Opting to treat with zpak which has historically worked well for Nicky. Known side effect/intolerance to augmentin. Also recommend flonase nasal spray and antihistamine for her symptoms. May try netti pot/saline rinse if desired.    - azithromycin (ZITHROMAX) 250 MG tablet; Take 2 tablets (500 mg) by mouth daily for 1 day, THEN 1 tablet (250 mg) daily for 4 days.  Dispense: 6 tablet; Refill: 0  -May use over-the-counter Tylenol or ibuprofen PRN    Discussed warning signs/symptoms indicative of need to f/u    Follow up if symptoms persist or worsen or concerns    I explained my diagnostic considerations and recommendations to the patient, who voiced understanding and agreement with the treatment plan. All questions were answered. We discussed potential side effects of any prescribed or recommended therapies, as well as expectations for response to treatments.    Joanna Curry NP  5/26/2023  11:37 AM    HPI:  Nicky Hart is a 28 year old female who presents to [facility] today for concerns of nasal congestion with facial pain/pressure. She also has a cough. Was sick for a couple of weeks, got better for a couple days, then got worse again. Lots of facial discomfort.  is being treated with chemotherapy for leukemia (currently in remission) thus she is very cautious to hopefully avoid getting him ill. Their children are not currently ill.  Fever 101.8 today; broke on its own. No other fevers. No known exposures.     Did not finish abx recently; did not tolerate well.   Cefdinir for middle ear infection on May 6th.     No body aches, no fatigue. No sore throat.     ROS otherwise negative.     Past  Medical History:   Diagnosis Date     Chronic cough 2002    resolved     Closed fracture of lower end of radius 2006    L arm     History of early menarche 2007     Preeclampsia 2019     Pregnancy          Subacute endomyometritis 2017     Past Surgical History:   Procedure Laterality Date     DILATION AND CURETTAGE N/A 2017    Dr. Hazel; [facility] - retained placenta/endometritis     Social History     Tobacco Use     Smoking status: Never     Smokeless tobacco: Never   Vaping Use     Vaping status: Never Used   Substance Use Topics     Alcohol use: Not Currently     Comment: rare - 3 times per year     Current Outpatient Medications   Medication Sig Dispense Refill     ALPRAZolam (XANAX) 0.5 MG tablet Take 1 tablet (0.5 mg) by mouth 3 times daily as needed for anxiety 10 tablet 1     amphetamine-dextroamphetamine (ADDERALL XR) 25 MG 24 hr capsule Take 1 capsule (25 mg) by mouth daily for 30 days 30 capsule 0     [START ON 2023] amphetamine-dextroamphetamine (ADDERALL XR) 25 MG 24 hr capsule Take 1 capsule (25 mg) by mouth daily for 30 days 30 capsule 0     amphetamine-dextroamphetamine (ADDERALL) 10 MG tablet Take 1 tablet (10 mg) by mouth daily as needed (adhd) 30 tablet 0     benzonatate (TESSALON) 100 MG capsule Take 1 capsule (100 mg) by mouth 3 times daily as needed for cough 30 capsule 0     busPIRone (BUSPAR) 5 MG tablet Take 1-3 tablets daily as needed 90 tablet 3     EPINEPHrine (ANY BX GENERIC EQUIV) 0.3 MG/0.3ML injection 2-pack Inject 0.3 mLs (0.3 mg) into the muscle once as needed for anaphylaxis (Repeat in 5-15 minutes if needed) for allergic reaction for up to 1 dose. 2 each 11     FLUoxetine (PROZAC) 20 MG capsule Take 1 capsule (20 mg) by mouth daily 90 capsule 3     rimegepant (NURTEC) 75 MG ODT tablet Take 1 tablet every other day-may use 1 tablet in between as needed for migraines 30 tablet 1     rizatriptan (MAXALT) 10 MG tablet Take 1 tablet (10 mg) by mouth at  "onset of headache for migraine May repeat in 2 hours. Max 3 tablets/24 hours. 16 tablet 1     triamcinolone (KENALOG) 0.1 % external cream Apply topically 2 times daily as needed for irritation 80 g 1     Allergies   Allergen Reactions     Bermuda Grass Extract Itching     Dust Mite Extract Itching     Mold Itching     Pollen Extract Itching     Tree Nuts  [Nuts] Itching     Amoxicillin-Pot Clavulanate Diarrhea     Past medical history, past surgical history, current medications and allergies reviewed and accurate to the best of my knowledge.      ROS:  Refer to HPI    /70 (BP Location: Right arm, Patient Position: Sitting, Cuff Size: Adult Regular)   Pulse 90   Temp 98.9  F (37.2  C) (Tympanic)   Resp 16   Ht 1.651 m (5' 5\")   Wt 80.7 kg (178 lb)   LMP 05/21/2023 (Exact Date)   SpO2 99%   BMI 29.62 kg/m      EXAM:  General Appearance: Well appearing 28 year old female, appropriate appearance for age. No acute distress   Ears: Left TM intact, translucent with bony landmarks appreciated, no erythema, + serous effusion, no bulging, no purulence.  Right TM intact, translucent with bony landmarks appreciated, no erythema, + serous effusion, no bulging, no purulence.  Left auditory canal clear.  Right auditory canal clear.  Normal external ears, non tender.  Eyes: conjunctivae normal without erythema or irritation, corneas clear, no drainage or crusting, no eyelid swelling, pupils equal   Oropharynx: moist mucous membranes, posterior pharynx without erythema, tonsils symmetric, no erythema, no exudates or petechiae, no post nasal drip seen, no trismus, voice clear.    Sinuses:  + sinus tenderness upon palpation of the bilateral maxillary sinuses  Nose:  Bilateral nares: + erythema, no edema, + nasal congestion   Neck: supple without adenopathy  Respiratory: normal chest wall and respirations.  Normal effort.  Clear to auscultation bilaterally, no wheezing, crackles or rhonchi.  No increased work of " breathing.  + occasional nonproductive cough appreciated.  Cardiac: RRR with no murmurs  Psychological: normal affect, alert, oriented, and pleasant.

## 2023-06-14 ENCOUNTER — TELEPHONE (OUTPATIENT)
Dept: FAMILY MEDICINE | Facility: OTHER | Age: 28
End: 2023-06-14
Payer: COMMERCIAL

## 2023-06-14 ENCOUNTER — MYC MEDICAL ADVICE (OUTPATIENT)
Dept: FAMILY MEDICINE | Facility: OTHER | Age: 28
End: 2023-06-14
Payer: COMMERCIAL

## 2023-06-14 ENCOUNTER — LAB (OUTPATIENT)
Dept: LAB | Facility: OTHER | Age: 28
End: 2023-06-14
Payer: COMMERCIAL

## 2023-06-14 DIAGNOSIS — Z11.1 SCREENING EXAMINATION FOR PULMONARY TUBERCULOSIS: Primary | ICD-10-CM

## 2023-06-14 DIAGNOSIS — Z11.1 SCREENING EXAMINATION FOR PULMONARY TUBERCULOSIS: ICD-10-CM

## 2023-06-14 PROCEDURE — 36415 COLL VENOUS BLD VENIPUNCTURE: CPT | Mod: ZL

## 2023-06-14 PROCEDURE — 86481 TB AG RESPONSE T-CELL SUSP: CPT | Mod: ZL

## 2023-06-15 ENCOUNTER — OFFICE VISIT (OUTPATIENT)
Dept: FAMILY MEDICINE | Facility: OTHER | Age: 28
End: 2023-06-15
Attending: NURSE PRACTITIONER
Payer: COMMERCIAL

## 2023-06-15 VITALS
RESPIRATION RATE: 16 BRPM | BODY MASS INDEX: 29.97 KG/M2 | DIASTOLIC BLOOD PRESSURE: 62 MMHG | SYSTOLIC BLOOD PRESSURE: 114 MMHG | OXYGEN SATURATION: 98 % | TEMPERATURE: 98.7 F | WEIGHT: 180.1 LBS | HEART RATE: 90 BPM

## 2023-06-15 DIAGNOSIS — R22.41 LOCALIZED SWELLING OF RIGHT FOOT: Primary | ICD-10-CM

## 2023-06-15 DIAGNOSIS — L03.115 CELLULITIS OF RIGHT LOWER EXTREMITY: ICD-10-CM

## 2023-06-15 DIAGNOSIS — S90.861A INSECT BITE OF RIGHT FOOT WITH LOCAL REACTION, INITIAL ENCOUNTER: ICD-10-CM
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
## 2023-06-15 PROCEDURE — 250N000011 HC RX IP 250 OP 636: Performed by: NURSE PRACTITIONER

## 2023-06-15 PROCEDURE — 96372 THER/PROPH/DIAG INJ SC/IM: CPT | Performed by: NURSE PRACTITIONER

## 2023-06-15 PROCEDURE — 99213 OFFICE O/P EST LOW 20 MIN: CPT | Performed by: NURSE PRACTITIONER

## 2023-06-15 RX ORDER — TRIAMCINOLONE ACETONIDE 40 MG/ML
40 INJECTION, SUSPENSION INTRA-ARTICULAR; INTRAMUSCULAR ONCE
Qty: 1 ML | Refills: 0 | Status: SHIPPED | OUTPATIENT
Start: 2023-06-15 | End: 2023-06-15

## 2023-06-15 RX ORDER — CEPHALEXIN 500 MG/1
500 CAPSULE ORAL 2 TIMES DAILY
Qty: 14 CAPSULE | Refills: 0 | Status: SHIPPED | OUTPATIENT
Start: 2023-06-15 | End: 2023-06-22

## 2023-06-15 RX ORDER — TRIAMCINOLONE ACETONIDE 40 MG/ML
40 INJECTION, SUSPENSION INTRA-ARTICULAR; INTRAMUSCULAR ONCE
Status: COMPLETED | OUTPATIENT
Start: 2023-06-15 | End: 2023-06-15

## 2023-06-15 RX ADMIN — TRIAMCINOLONE ACETONIDE 40 MG: 40 INJECTION, SUSPENSION INTRA-ARTICULAR; INTRAMUSCULAR at 16:00

## 2023-06-15 ASSESSMENT — PAIN SCALES - GENERAL: PAINLEVEL: NO PAIN (0)

## 2023-06-15 NOTE — PROGRESS NOTES
ASSESSMENT/PLAN:    I have reviewed the nursing notes.  I have reviewed the findings, diagnosis, plan and need for follow up with the patient.    1. Localized swelling of right foot  2. Insect bite of right foot with local reaction, initial encounter  - triamcinolone (KENALOG-40) injection 40 mg  Large localized reaction to insect bite of the right foot.  Opted to treat with triamcinolone as antihistamines alone are not helping as they usually do.  At this time there is no evidence of cellulitis as this is only 48 hours out.  I did opt to provide patient with a printed prescription for cephalexin to take only in the event that over the next 2 to 3 days she does not have improvement or if the area becomes warm, more erythematous, tender or if she feels unwell. I explained to her I do not think she will likely need to take the antibiotic.  This to be more likely consistent with a secondary cellulitis.  Patient is reliable and receptive to this plan.  Of course in the event that she has severe symptoms or high fever she should be reevaluated.  Continue with elevation and ice to the right lower extremity.    3. Cellulitis of right lower extremity  - cephALEXin (KEFLEX) 500 MG capsule; Take 1 capsule (500 mg) by mouth 2 times daily for 7 days  Dispense: 14 capsule; Refill: 0  - triamcinolone (KENALOG-40) injection 40 mg    Discussed warning signs/symptoms indicative of need to f/u    Follow up if symptoms persist or worsen or concerns    I explained my diagnostic considerations and recommendations to the patient, who voiced understanding and agreement with the treatment plan. All questions were answered. We discussed potential side effects of any prescribed or recommended therapies, as well as expectations for response to treatments.    Joanna Curry NP  6/15/2023  3:37 PM    HPI:  Nicky Hart is a 28 year old female  who presents to [facility] today for concerns of bug bite in her right foot that happened about  48 hours ago. Patient has taken 2 benadryl with no relief. Patient has been using ice and elevation as able but has been working most of the day. 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  She was bit by a deer fly about 48 hours ago.  She does state that there is very slight improvement over the course of the day today.  The foot is uncomfortable, swollen to the point where she cannot wear her shoe easily, red and slightly tender.  There is no drainage.  She does have a history having reactions to insect bites before but usually Benadryl take care of it.  No fever.     Past Medical History:   Diagnosis Date     Chronic cough 2002    resolved     Closed fracture of lower end of radius 2006    L arm     History of early menarche 2007     Preeclampsia 2019     Pregnancy          Subacute endomyometritis 2017     Past Surgical History:   Procedure Laterality Date     DILATION AND CURETTAGE N/A 2017    Dr. Hazel; [facility] - retained placenta/endometritis     Social History     Tobacco Use     Smoking status: Never     Smokeless tobacco: Never   Vaping Use     Vaping status: Never Used   Substance Use Topics     Alcohol use: Not Currently     Comment: rare - 3 times per year     Current Outpatient Medications   Medication Sig Dispense Refill     ALPRAZolam (XANAX) 0.5 MG tablet Take 1 tablet (0.5 mg) by mouth 3 times daily as needed for anxiety 10 tablet 1     amphetamine-dextroamphetamine (ADDERALL XR) 25 MG 24 hr capsule Take 1 capsule (25 mg) by mouth daily for 30 days 30 capsule 0     amphetamine-dextroamphetamine (ADDERALL) 10 MG tablet Take 1 tablet (10 mg) by mouth daily as needed (adhd) 30 tablet 0     benzonatate (TESSALON) 100 MG capsule Take 1 capsule (100 mg) by mouth 3 times daily as needed for cough 30 capsule 0     busPIRone (BUSPAR) 5 MG tablet Take 1-3 tablets daily as needed 90 tablet 3     cephALEXin (KEFLEX) 500 MG capsule Take 1 capsule (500 mg) by mouth 2  times daily for 7 days 14 capsule 0     EPINEPHrine (ANY BX GENERIC EQUIV) 0.3 MG/0.3ML injection 2-pack Inject 0.3 mLs (0.3 mg) into the muscle once as needed for anaphylaxis (Repeat in 5-15 minutes if needed) for allergic reaction for up to 1 dose. 2 each 11     FLUoxetine (PROZAC) 20 MG capsule Take 1 capsule (20 mg) by mouth daily 90 capsule 3     rimegepant (NURTEC) 75 MG ODT tablet Take 1 tablet every other day-may use 1 tablet in between as needed for migraines 30 tablet 1     rizatriptan (MAXALT) 10 MG tablet Take 1 tablet (10 mg) by mouth at onset of headache for migraine May repeat in 2 hours. Max 3 tablets/24 hours. 16 tablet 1     triamcinolone (KENALOG) 0.1 % external cream Apply topically 2 times daily as needed for irritation 80 g 1     Allergies   Allergen Reactions     Bermuda Grass Extract Itching     Dust Mite Extract Itching     Mold Itching     Pollen Extract Itching     Tree Nuts  [Nuts] Itching     Amoxicillin-Pot Clavulanate Diarrhea     Past medical history, past surgical history, current medications and allergies reviewed and accurate to the best of my knowledge.      ROS:  Refer to HPI    /62   Pulse 90   Temp 98.7  F (37.1  C) (Tympanic)   Resp 16   Wt 81.7 kg (180 lb 1.6 oz)   LMP 05/21/2023 (Exact Date)   SpO2 98%   BMI 29.97 kg/m      EXAM:  General Appearance: Well appearing 28 year old female, appropriate appearance for age. No acute distress   Respiratory: No increased work of breathing.  No cough appreciated.  Musculoskeletal:  Equal movement of bilateral upper extremities.  Equal movement of bilateral lower extremities.  Normal gait.    Dermatological: + right foot; insect bites to the top of the proximal foot without drainage. The foot is moderately swollen, red.   Psychological: normal affect, alert, oriented, and pleasant.

## 2023-06-15 NOTE — NURSING NOTE
"Chief Complaint   Patient presents with     Insect Bites     Right foot       Patient is here for a bug bite in her right foot that happened about 48 hours ago. Patient has taken 2 benadryl with no relief. Patient has been using ice.     Initial /62   Pulse 90   Temp 98.7  F (37.1  C) (Tympanic)   Resp 16   Wt 81.7 kg (180 lb 1.6 oz)   LMP 05/21/2023 (Exact Date)   SpO2 98%   BMI 29.97 kg/m   Estimated body mass index is 29.97 kg/m  as calculated from the following:    Height as of 5/26/23: 1.651 m (5' 5\").    Weight as of this encounter: 81.7 kg (180 lb 1.6 oz).  Medication Reconciliation: complete    Zoie Kelley LPN  "

## 2023-06-16 LAB
GAMMA INTERFERON BACKGROUND BLD IA-ACNC: 0.05 IU/ML
M TB IFN-G BLD-IMP: NEGATIVE
M TB IFN-G CD4+ BCKGRND COR BLD-ACNC: 9.95 IU/ML
MITOGEN IGNF BCKGRD COR BLD-ACNC: -0.02 IU/ML
MITOGEN IGNF BCKGRD COR BLD-ACNC: -0.02 IU/ML
QUANTIFERON MITOGEN: 10 IU/ML
QUANTIFERON NIL TUBE: 0.05 IU/ML
QUANTIFERON TB1 TUBE: 0.03 IU/ML
QUANTIFERON TB2 TUBE: 0.03

## 2023-06-27 DIAGNOSIS — F90.2 ATTENTION DEFICIT HYPERACTIVITY DISORDER (ADHD), COMBINED TYPE: ICD-10-CM

## 2023-06-28 RX ORDER — DEXTROAMPHETAMINE SULFATE, DEXTROAMPHETAMINE SACCHARATE, AMPHETAMINE SULFATE AND AMPHETAMINE ASPARTATE 6.25; 6.25; 6.25; 6.25 MG/1; MG/1; MG/1; MG/1
CAPSULE, EXTENDED RELEASE ORAL
Qty: 30 CAPSULE | Refills: 0 | OUTPATIENT
Start: 2023-06-28

## 2023-06-28 NOTE — TELEPHONE ENCOUNTER
Needs to be seen in office for refill of adderall. Luiza Murillo, MIGDALIA CNP on 6/28/2023 at 12:53 PM

## 2023-06-30 ENCOUNTER — OFFICE VISIT (OUTPATIENT)
Dept: FAMILY MEDICINE | Facility: OTHER | Age: 28
End: 2023-06-30
Attending: NURSE PRACTITIONER
Payer: COMMERCIAL

## 2023-06-30 VITALS
HEIGHT: 65 IN | TEMPERATURE: 98.1 F | OXYGEN SATURATION: 98 % | RESPIRATION RATE: 16 BRPM | WEIGHT: 176 LBS | BODY MASS INDEX: 29.32 KG/M2 | DIASTOLIC BLOOD PRESSURE: 76 MMHG | HEART RATE: 88 BPM | SYSTOLIC BLOOD PRESSURE: 118 MMHG

## 2023-06-30 DIAGNOSIS — G43.809 OTHER MIGRAINE WITHOUT STATUS MIGRAINOSUS, NOT INTRACTABLE: ICD-10-CM

## 2023-06-30 DIAGNOSIS — F41.9 ANXIETY: ICD-10-CM

## 2023-06-30 DIAGNOSIS — F90.2 ATTENTION DEFICIT HYPERACTIVITY DISORDER (ADHD), COMBINED TYPE: ICD-10-CM

## 2023-06-30 PROCEDURE — 99213 OFFICE O/P EST LOW 20 MIN: CPT | Performed by: NURSE PRACTITIONER

## 2023-06-30 RX ORDER — DEXTROAMPHETAMINE SACCHARATE, AMPHETAMINE ASPARTATE MONOHYDRATE, DEXTROAMPHETAMINE SULFATE AND AMPHETAMINE SULFATE 6.25; 6.25; 6.25; 6.25 MG/1; MG/1; MG/1; MG/1
25 CAPSULE, EXTENDED RELEASE ORAL DAILY
Qty: 30 CAPSULE | Refills: 0 | Status: SHIPPED | OUTPATIENT
Start: 2023-07-31 | End: 2023-08-30

## 2023-06-30 RX ORDER — ALPRAZOLAM 0.5 MG
0.5 TABLET ORAL 3 TIMES DAILY PRN
Qty: 10 TABLET | Refills: 1 | Status: SHIPPED | OUTPATIENT
Start: 2023-06-30 | End: 2023-09-28

## 2023-06-30 RX ORDER — DEXTROAMPHETAMINE SACCHARATE, AMPHETAMINE ASPARTATE MONOHYDRATE, DEXTROAMPHETAMINE SULFATE AND AMPHETAMINE SULFATE 6.25; 6.25; 6.25; 6.25 MG/1; MG/1; MG/1; MG/1
25 CAPSULE, EXTENDED RELEASE ORAL DAILY
Qty: 30 CAPSULE | Refills: 0 | Status: SHIPPED | OUTPATIENT
Start: 2023-08-31 | End: 2023-09-30

## 2023-06-30 RX ORDER — DEXTROAMPHETAMINE SACCHARATE, AMPHETAMINE ASPARTATE MONOHYDRATE, DEXTROAMPHETAMINE SULFATE AND AMPHETAMINE SULFATE 6.25; 6.25; 6.25; 6.25 MG/1; MG/1; MG/1; MG/1
25 CAPSULE, EXTENDED RELEASE ORAL DAILY
Qty: 30 CAPSULE | Refills: 0 | Status: SHIPPED | OUTPATIENT
Start: 2023-06-30 | End: 2023-07-30

## 2023-06-30 RX ORDER — DEXTROAMPHETAMINE SACCHARATE, AMPHETAMINE ASPARTATE, DEXTROAMPHETAMINE SULFATE AND AMPHETAMINE SULFATE 2.5; 2.5; 2.5; 2.5 MG/1; MG/1; MG/1; MG/1
10 TABLET ORAL DAILY PRN
Qty: 30 TABLET | Refills: 0 | Status: SHIPPED | OUTPATIENT
Start: 2023-06-30 | End: 2023-12-14

## 2023-06-30 ASSESSMENT — ANXIETY QUESTIONNAIRES
7. FEELING AFRAID AS IF SOMETHING AWFUL MIGHT HAPPEN: MORE THAN HALF THE DAYS
3. WORRYING TOO MUCH ABOUT DIFFERENT THINGS: MORE THAN HALF THE DAYS
1. FEELING NERVOUS, ANXIOUS, OR ON EDGE: SEVERAL DAYS
IF YOU CHECKED OFF ANY PROBLEMS ON THIS QUESTIONNAIRE, HOW DIFFICULT HAVE THESE PROBLEMS MADE IT FOR YOU TO DO YOUR WORK, TAKE CARE OF THINGS AT HOME, OR GET ALONG WITH OTHER PEOPLE: SOMEWHAT DIFFICULT
2. NOT BEING ABLE TO STOP OR CONTROL WORRYING: MORE THAN HALF THE DAYS
5. BEING SO RESTLESS THAT IT IS HARD TO SIT STILL: MORE THAN HALF THE DAYS
GAD7 TOTAL SCORE: 13
7. FEELING AFRAID AS IF SOMETHING AWFUL MIGHT HAPPEN: MORE THAN HALF THE DAYS
6. BECOMING EASILY ANNOYED OR IRRITABLE: MORE THAN HALF THE DAYS
4. TROUBLE RELAXING: MORE THAN HALF THE DAYS
GAD7 TOTAL SCORE: 13
8. IF YOU CHECKED OFF ANY PROBLEMS, HOW DIFFICULT HAVE THESE MADE IT FOR YOU TO DO YOUR WORK, TAKE CARE OF THINGS AT HOME, OR GET ALONG WITH OTHER PEOPLE?: SOMEWHAT DIFFICULT

## 2023-06-30 NOTE — NURSING NOTE
Patient presents today for medication management. She is needing a refill of Adderall.      Medication Reconciliation Complete    Ai Barth LPN  6/30/2023 9:54 AM

## 2023-06-30 NOTE — PROGRESS NOTES
Assessment & Plan   Problem List Items Addressed This Visit    None  Visit Diagnoses     Other migraine without status migrainosus, not intractable        Relevant Medications    rimegepant (NURTEC) 75 MG ODT tablet    Attention deficit hyperactivity disorder (ADHD), combined type        Relevant Medications    amphetamine-dextroamphetamine (ADDERALL XR) 25 MG 24 hr capsule    amphetamine-dextroamphetamine (ADDERALL XR) 25 MG 24 hr capsule (Start on 7/31/2023)    amphetamine-dextroamphetamine (ADDERALL XR) 25 MG 24 hr capsule (Start on 8/31/2023)    amphetamine-dextroamphetamine (ADDERALL) 10 MG tablet    Anxiety        Relevant Medications    ALPRAZolam (XANAX) 0.5 MG tablet      [place]  reviewed and as expected.  Refilled Adderall, Xanax and Nurtec.  Plan to follow-up in 3 months, sooner if concerns.    Prescription drug management  24 minutes spent by me on the date of the encounter doing chart review, history and exam, documentation and further activities per the note           No follow-ups on file.    MIGDALIA Tan [facility] AND [facility]    Zandra Saunders is a 28 year old, presenting for the following health issues:  Medication Therapy Management  History of Present Illness       Reason for visit:  Med management    She eats 2-3 servings of fruits and vegetables daily.She consumes 1 sweetened beverage(s) daily.She exercises with enough effort to increase her heart rate 20 to 29 minutes per day.  She exercises with enough effort to increase her heart rate 3 or less days per week. She is missing 3 dose(s) of medications per week.  Today's GAYLE-7 Score: 13     She comes in today for refill of her Adderall.  She is doing well with this medication, taking 25 mg of extended release daily.  She was also given 10 mg of immediate release 3 months ago, she has 2 pills left, only uses this on long workdays.  She is under a lot of stress with her 's health, children's health, job  "changes.  She is overall feeling overwhelmed.  She did stop the Prozac, was causing a lot of headaches and she was not consistently taking this.  She is wondering if we can try to get the Nurtec filled, she is unsure if there is an issue with insurance or what happened but she could not get this filled at her last visit.      Review of Systems   See above      Objective    /76   Pulse 88   Temp 98.1  F (36.7  C)   Resp 16   Ht 1.651 m (5' 5\")   Wt 79.8 kg (176 lb)   LMP 05/21/2023 (Exact Date)   SpO2 98%   BMI 29.29 kg/m    Body mass index is 29.29 kg/m .  Physical Exam   GENERAL: healthy, alert and no distress  EYES: Eyes grossly normal to inspection  NEURO: Normal strength and tone, mentation intact and speech normal  PSYCH: mentation appears normal, affect normal/bright                    "

## 2023-08-28 DIAGNOSIS — F90.2 ATTENTION DEFICIT HYPERACTIVITY DISORDER (ADHD), COMBINED TYPE: ICD-10-CM

## 2023-09-01 RX ORDER — DEXTROAMPHETAMINE SACCHARATE, AMPHETAMINE ASPARTATE, DEXTROAMPHETAMINE SULFATE AND AMPHETAMINE SULFATE 2.5; 2.5; 2.5; 2.5 MG/1; MG/1; MG/1; MG/1
10 TABLET ORAL DAILY PRN
Qty: 30 TABLET | Refills: 0 | OUTPATIENT
Start: 2023-09-01

## 2023-09-01 NOTE — TELEPHONE ENCOUNTER
SURYA sent Rx request for the following:    AMPHETAMINE SALTS 10MG TABLET   Last Prescription Date:   8/31/23  Last Fill Qty/Refills:         30, R-0    Last Office Visit:              6/30/23   Future Office visit:           none     Last order on file.  Madalyn Seaman RN on 9/1/2023 at 9:16 AM

## 2023-09-13 NOTE — TELEPHONE ENCOUNTER
Called Patient. She was able to  the Adderall XR but not the short acting. Patient made appointment and was educated to make appointments every three months for medication review for controlled substances. Has an appointment on 9/28/23. Demi Rosen RN on 9/13/2023 at 2:13 PM

## 2023-09-14 RX ORDER — DEXTROAMPHETAMINE SACCHARATE, AMPHETAMINE ASPARTATE, DEXTROAMPHETAMINE SULFATE AND AMPHETAMINE SULFATE 2.5; 2.5; 2.5; 2.5 MG/1; MG/1; MG/1; MG/1
10 TABLET ORAL DAILY PRN
Qty: 15 TABLET | Refills: 0 | OUTPATIENT
Start: 2023-09-14

## 2023-09-18 NOTE — TELEPHONE ENCOUNTER
Patient contacted and informed of Luiza Murillo's response     Joanna Herrera CMA on 9/18/2023 at 2:52 PM

## 2023-09-28 ENCOUNTER — OFFICE VISIT (OUTPATIENT)
Dept: FAMILY MEDICINE | Facility: OTHER | Age: 28
End: 2023-09-28
Attending: NURSE PRACTITIONER
Payer: COMMERCIAL

## 2023-09-28 VITALS
RESPIRATION RATE: 16 BRPM | BODY MASS INDEX: 31.16 KG/M2 | HEIGHT: 65 IN | WEIGHT: 187 LBS | DIASTOLIC BLOOD PRESSURE: 74 MMHG | SYSTOLIC BLOOD PRESSURE: 120 MMHG | TEMPERATURE: 98.1 F | OXYGEN SATURATION: 99 % | HEART RATE: 88 BPM

## 2023-09-28 DIAGNOSIS — R63.5 WEIGHT GAIN: Primary | ICD-10-CM

## 2023-09-28 DIAGNOSIS — G43.809 OTHER MIGRAINE WITHOUT STATUS MIGRAINOSUS, NOT INTRACTABLE: ICD-10-CM

## 2023-09-28 DIAGNOSIS — F41.9 ANXIETY: ICD-10-CM

## 2023-09-28 DIAGNOSIS — Z91.030 BEE STING ALLERGY: ICD-10-CM

## 2023-09-28 DIAGNOSIS — F90.2 ATTENTION DEFICIT HYPERACTIVITY DISORDER (ADHD), COMBINED TYPE: ICD-10-CM

## 2023-09-28 LAB
ANION GAP SERPL CALCULATED.3IONS-SCNC: 10 MMOL/L (ref 7–15)
BUN SERPL-MCNC: 14.9 MG/DL (ref 6–20)
CALCIUM SERPL-MCNC: 9.7 MG/DL (ref 8.6–10)
CHLORIDE SERPL-SCNC: 106 MMOL/L (ref 98–107)
CREAT SERPL-MCNC: 0.71 MG/DL (ref 0.51–0.95)
EGFRCR SERPLBLD CKD-EPI 2021: >90 ML/MIN/1.73M2
GLUCOSE SERPL-MCNC: 87 MG/DL (ref 70–99)
HCO3 SERPL-SCNC: 23 MMOL/L (ref 22–29)
HOLD SPECIMEN: NORMAL
POTASSIUM SERPL-SCNC: 4.1 MMOL/L (ref 3.4–5.3)
SODIUM SERPL-SCNC: 139 MMOL/L (ref 135–145)
TSH SERPL DL<=0.005 MIU/L-ACNC: 1.76 UIU/ML (ref 0.3–4.2)

## 2023-09-28 PROCEDURE — 84443 ASSAY THYROID STIM HORMONE: CPT | Mod: ZL | Performed by: NURSE PRACTITIONER

## 2023-09-28 PROCEDURE — G0463 HOSPITAL OUTPT CLINIC VISIT: HCPCS

## 2023-09-28 PROCEDURE — 99214 OFFICE O/P EST MOD 30 MIN: CPT | Performed by: NURSE PRACTITIONER

## 2023-09-28 PROCEDURE — 80051 ELECTROLYTE PANEL: CPT | Mod: ZL | Performed by: NURSE PRACTITIONER

## 2023-09-28 PROCEDURE — 36415 COLL VENOUS BLD VENIPUNCTURE: CPT | Mod: ZL | Performed by: NURSE PRACTITIONER

## 2023-09-28 RX ORDER — ALPRAZOLAM 0.5 MG
0.5 TABLET ORAL
Qty: 10 TABLET | Refills: 1 | Status: SHIPPED | OUTPATIENT
Start: 2023-09-28 | End: 2023-12-14

## 2023-09-28 RX ORDER — DEXTROAMPHETAMINE SACCHARATE, AMPHETAMINE ASPARTATE, DEXTROAMPHETAMINE SULFATE AND AMPHETAMINE SULFATE 2.5; 2.5; 2.5; 2.5 MG/1; MG/1; MG/1; MG/1
10 TABLET ORAL DAILY
Qty: 30 TABLET | Refills: 0 | Status: SHIPPED | OUTPATIENT
Start: 2023-10-29 | End: 2023-11-28

## 2023-09-28 RX ORDER — DEXTROAMPHETAMINE SACCHARATE, AMPHETAMINE ASPARTATE, DEXTROAMPHETAMINE SULFATE AND AMPHETAMINE SULFATE 2.5; 2.5; 2.5; 2.5 MG/1; MG/1; MG/1; MG/1
10 TABLET ORAL DAILY
Qty: 30 TABLET | Refills: 0 | Status: SHIPPED | OUTPATIENT
Start: 2023-09-28 | End: 2023-10-28

## 2023-09-28 RX ORDER — DEXTROAMPHETAMINE SACCHARATE, AMPHETAMINE ASPARTATE, DEXTROAMPHETAMINE SULFATE AND AMPHETAMINE SULFATE 2.5; 2.5; 2.5; 2.5 MG/1; MG/1; MG/1; MG/1
10 TABLET ORAL DAILY
Qty: 30 TABLET | Refills: 0 | Status: SHIPPED | OUTPATIENT
Start: 2023-11-29 | End: 2023-12-14

## 2023-09-28 RX ORDER — EPINEPHRINE 0.3 MG/.3ML
0.3 INJECTION SUBCUTANEOUS
Qty: 2 EACH | Refills: 11 | Status: SHIPPED | OUTPATIENT
Start: 2023-09-28

## 2023-09-28 ASSESSMENT — ANXIETY QUESTIONNAIRES
4. TROUBLE RELAXING: SEVERAL DAYS
7. FEELING AFRAID AS IF SOMETHING AWFUL MIGHT HAPPEN: MORE THAN HALF THE DAYS
GAD7 TOTAL SCORE: 11
GAD7 TOTAL SCORE: 11
6. BECOMING EASILY ANNOYED OR IRRITABLE: NEARLY EVERY DAY
1. FEELING NERVOUS, ANXIOUS, OR ON EDGE: SEVERAL DAYS
3. WORRYING TOO MUCH ABOUT DIFFERENT THINGS: MORE THAN HALF THE DAYS
5. BEING SO RESTLESS THAT IT IS HARD TO SIT STILL: NOT AT ALL
2. NOT BEING ABLE TO STOP OR CONTROL WORRYING: MORE THAN HALF THE DAYS
IF YOU CHECKED OFF ANY PROBLEMS ON THIS QUESTIONNAIRE, HOW DIFFICULT HAVE THESE PROBLEMS MADE IT FOR YOU TO DO YOUR WORK, TAKE CARE OF THINGS AT HOME, OR GET ALONG WITH OTHER PEOPLE: SOMEWHAT DIFFICULT

## 2023-09-28 ASSESSMENT — PAIN SCALES - GENERAL: PAINLEVEL: NO PAIN (0)

## 2023-09-28 NOTE — PROGRESS NOTES
"  Assessment & Plan   Problem List Items Addressed This Visit    None  Visit Diagnoses       Weight gain    -  Primary    Relevant Orders    TSH Reflex GH (Completed)    Basic Metabolic Panel (Completed)    Other migraine without status migrainosus, not intractable        Relevant Medications    rimegepant (NURTEC) 75 MG ODT tablet    Other Relevant Orders    Extra SST Tube (LAB USE ONLY) (Completed)    Bee sting allergy        Relevant Medications    EPINEPHrine (ANY BX GENERIC EQUIV) 0.3 MG/0.3ML injection 2-pack    Anxiety        Relevant Medications    ALPRAZolam (XANAX) 0.5 MG tablet    Attention deficit hyperactivity disorder (ADHD), combined type        Relevant Medications    amphetamine-dextroamphetamine (ADDERALL) 10 MG tablet    amphetamine-dextroamphetamine (ADDERALL) 10 MG tablet (Start on 2023)    amphetamine-dextroamphetamine (ADDERALL) 10 MG tablet (Start on 10/29/2023)           She does request a refill of epinephrine, her previous prescription was .  [place]  was reviewed.  Xanax refilled to help with sleep at night and acute panic attacks.  Adderall 10 mg immediate release prescribed x3 months.  Prescribed Nurtec for migraines, she will let me know if there is any issues getting this filled.  Labs updated due to weight gain, TSH and BMP are stable.  We did discuss dietary referral, she would like to work on things at home.  We do long discussion about weight gain, dietary recommendations and exercise.  She will work on lifestyle modifications and follow-up as needed.    Review of the result(s) of each unique test - TSH, BMP  Ordering of each unique test  Prescription drug management         BMI:   Estimated body mass index is 31.12 kg/m  as calculated from the following:    Height as of this encounter: 1.651 m (5' 5\").    Weight as of this encounter: 84.8 kg (187 lb).   Weight management plan: Discussed healthy diet and exercise guidelines        No follow-ups on file.    Luiza " MIGDALIA Montalvo [facility]    Subjective   Nicky is a 28 year old, presenting for the following health issues:  Medication Therapy Management      History of Present Illness       Reason for visit:  Med management    She eats 0-1 servings of fruits and vegetables daily.She consumes 0 sweetened beverage(s) daily.She exercises with enough effort to increase her heart rate 20 to 29 minutes per day.  She exercises with enough effort to increase her heart rate 3 or less days per week.      She comes in today to discuss multiple concerns.  She is looking to see about refills of medications for ADHD.  She previously was using Adderall XR 25 mg daily, 10 mg later in the day if needed.  She has found that taking the extended release Adderall has caused difficulties with her sleeping at night.  She does forget to take her medications frequently but when she does she is only using the immediate release 10 mg and that seems to be working well for her.    She has ongoing migraines, 2 over the past month, she is Tylenol, Flexeril, slept in a dark room and woke up with relief.  She has been on Maxalt in the past which was not helpful, Imitrex was not helpful and Topamax caused weight loss and other unwanted side effects.  We did try to order Nurtec in the past, she was not sure if this was covered or not because the pharmacy never told her if it was ready.    She also has concerns about weight gain.  Over the past year she has gained approximately 20 pounds.  She has been under a lot of stress, health problems with both her  and her daughter,  has been going through bone marrow biopsy and cancer treatments so she has been the primary financial support within the family.  She has noticed increased facial acne around temples and around her mouth, increased hair loss.  She is not exercising routinely, she did set up urgent membership today.  Menses has been regular, no excessive hair growth.   "She was drinking a large Coke from [employer] daily, she is currently 5 weeks without drinking this.  She knows she is not following a very healthy diet and could make some changes to this.      Review of Systems   See above      Objective    /74   Pulse 88   Temp 98.1  F (36.7  C)   Resp 16   Ht 1.651 m (5' 5\")   Wt 84.8 kg (187 lb)   LMP 09/13/2023 (Exact Date)   SpO2 99%   BMI 31.12 kg/m    Body mass index is 31.12 kg/m .  Physical Exam   GENERAL: healthy, alert and no distress  EYES: Eyes grossly normal to inspection,  NEURO: Normal strength and tone, mentation intact and speech normal  PSYCH: mentation appears normal, affect normal/bright    Results for orders placed or performed in visit on 09/28/23 (from the past 24 hour(s))   Basic Metabolic Panel   Result Value Ref Range    Sodium 139 135 - 145 mmol/L    Potassium 4.1 3.4 - 5.3 mmol/L    Chloride 106 98 - 107 mmol/L    Carbon Dioxide (CO2) 23 22 - 29 mmol/L    Anion Gap 10 7 - 15 mmol/L    Urea Nitrogen 14.9 6.0 - 20.0 mg/dL    Creatinine 0.71 0.51 - 0.95 mg/dL    GFR Estimate >90 >60 mL/min/1.73m2    Calcium 9.7 8.6 - 10.0 mg/dL    Glucose 87 70 - 99 mg/dL   TSH Reflex GH   Result Value Ref Range    TSH 1.76 0.30 - 4.20 uIU/mL   Extra SST Tube (LAB USE ONLY)   Result Value Ref Range    Hold Specimen JIC                      "

## 2023-09-28 NOTE — NURSING NOTE
Patient presents today for follow up on medication. Patient also had weight and hormonal concerns.    Medication Reconciliation Complete    Ai Barth LPN  9/28/2023 9:31 AM

## 2023-10-02 ENCOUNTER — TELEPHONE (OUTPATIENT)
Dept: FAMILY MEDICINE | Facility: OTHER | Age: 28
End: 2023-10-02
Payer: COMMERCIAL

## 2023-10-02 ENCOUNTER — MYC MEDICAL ADVICE (OUTPATIENT)
Dept: FAMILY MEDICINE | Facility: OTHER | Age: 28
End: 2023-10-02
Payer: COMMERCIAL

## 2023-10-02 DIAGNOSIS — G43.909 MIGRAINE WITHOUT STATUS MIGRAINOSUS, NOT INTRACTABLE, UNSPECIFIED MIGRAINE TYPE: Primary | ICD-10-CM

## 2023-10-02 NOTE — TELEPHONE ENCOUNTER
Reached out to NEERAJ in regards to Maddi PA.    Minna Cardenas RN on 10/2/2023 at 11:27 AM    Nurtec PA submitted by NEERAJ.    Patient update on [facility].    Minna Cardenas RN on 10/2/2023 at 12:19 PM

## 2023-10-03 NOTE — TELEPHONE ENCOUNTER
Refill Request (Patient prescribed Nurtec and per [employer] they need to try formulary options Ubrelvy, Ajovy, Emgality)   Please advise.  .Juanjo Carlos LPN on 10/2/2023 at 3:57 PM    
Ubrelvy ordered. Please let patient know. MIGDALIA Tan CNP on 10/3/2023 at 7:39 AM    
English

## 2023-11-08 DIAGNOSIS — F90.2 ATTENTION DEFICIT HYPERACTIVITY DISORDER (ADHD), COMBINED TYPE: ICD-10-CM

## 2023-11-09 RX ORDER — DEXTROAMPHETAMINE SACCHARATE, AMPHETAMINE ASPARTATE, DEXTROAMPHETAMINE SULFATE AND AMPHETAMINE SULFATE 2.5; 2.5; 2.5; 2.5 MG/1; MG/1; MG/1; MG/1
10 TABLET ORAL DAILY
Qty: 30 TABLET | Refills: 0 | OUTPATIENT
Start: 2023-11-09 | End: 2023-12-09

## 2023-12-14 ENCOUNTER — OFFICE VISIT (OUTPATIENT)
Dept: FAMILY MEDICINE | Facility: OTHER | Age: 28
End: 2023-12-14
Attending: NURSE PRACTITIONER
Payer: COMMERCIAL

## 2023-12-14 VITALS
DIASTOLIC BLOOD PRESSURE: 70 MMHG | BODY MASS INDEX: 31.79 KG/M2 | RESPIRATION RATE: 16 BRPM | HEIGHT: 65 IN | WEIGHT: 190.8 LBS | OXYGEN SATURATION: 98 % | HEART RATE: 108 BPM | SYSTOLIC BLOOD PRESSURE: 110 MMHG

## 2023-12-14 DIAGNOSIS — F90.2 ATTENTION DEFICIT HYPERACTIVITY DISORDER (ADHD), COMBINED TYPE: ICD-10-CM

## 2023-12-14 DIAGNOSIS — L70.0 ACNE VULGARIS: Primary | ICD-10-CM

## 2023-12-14 DIAGNOSIS — F41.8 DEPRESSION WITH ANXIETY: ICD-10-CM

## 2023-12-14 DIAGNOSIS — F41.9 ANXIETY: ICD-10-CM

## 2023-12-14 DIAGNOSIS — F60.3 BORDERLINE PERSONALITY DISORDER (H): ICD-10-CM

## 2023-12-14 PROCEDURE — 99214 OFFICE O/P EST MOD 30 MIN: CPT | Performed by: NURSE PRACTITIONER

## 2023-12-14 PROCEDURE — G0463 HOSPITAL OUTPT CLINIC VISIT: HCPCS

## 2023-12-14 RX ORDER — ALPRAZOLAM 0.5 MG
0.5 TABLET ORAL
Qty: 30 TABLET | Refills: 5 | Status: SHIPPED | OUTPATIENT
Start: 2023-12-14

## 2023-12-14 RX ORDER — SPIRONOLACTONE 25 MG/1
25 TABLET ORAL DAILY
Qty: 30 TABLET | Refills: 3 | Status: SHIPPED | OUTPATIENT
Start: 2023-12-14 | End: 2024-02-07 | Stop reason: DRUGHIGH

## 2023-12-14 RX ORDER — LISDEXAMFETAMINE DIMESYLATE 30 MG/1
30 CAPSULE ORAL EVERY MORNING
Qty: 30 CAPSULE | Refills: 0 | Status: SHIPPED | OUTPATIENT
Start: 2023-12-14 | End: 2024-01-11

## 2023-12-14 ASSESSMENT — ANXIETY QUESTIONNAIRES
3. WORRYING TOO MUCH ABOUT DIFFERENT THINGS: MORE THAN HALF THE DAYS
2. NOT BEING ABLE TO STOP OR CONTROL WORRYING: MORE THAN HALF THE DAYS
4. TROUBLE RELAXING: MORE THAN HALF THE DAYS
7. FEELING AFRAID AS IF SOMETHING AWFUL MIGHT HAPPEN: MORE THAN HALF THE DAYS
6. BECOMING EASILY ANNOYED OR IRRITABLE: MORE THAN HALF THE DAYS
5. BEING SO RESTLESS THAT IT IS HARD TO SIT STILL: MORE THAN HALF THE DAYS
1. FEELING NERVOUS, ANXIOUS, OR ON EDGE: MORE THAN HALF THE DAYS
IF YOU CHECKED OFF ANY PROBLEMS ON THIS QUESTIONNAIRE, HOW DIFFICULT HAVE THESE PROBLEMS MADE IT FOR YOU TO DO YOUR WORK, TAKE CARE OF THINGS AT HOME, OR GET ALONG WITH OTHER PEOPLE: SOMEWHAT DIFFICULT
GAD7 TOTAL SCORE: 14
GAD7 TOTAL SCORE: 14

## 2023-12-14 ASSESSMENT — PATIENT HEALTH QUESTIONNAIRE - PHQ9
10. IF YOU CHECKED OFF ANY PROBLEMS, HOW DIFFICULT HAVE THESE PROBLEMS MADE IT FOR YOU TO DO YOUR WORK, TAKE CARE OF THINGS AT HOME, OR GET ALONG WITH OTHER PEOPLE: VERY DIFFICULT
SUM OF ALL RESPONSES TO PHQ QUESTIONS 1-9: 12
SUM OF ALL RESPONSES TO PHQ QUESTIONS 1-9: 12

## 2023-12-14 NOTE — PROGRESS NOTES
Assessment & Plan   Problem List Items Addressed This Visit          Behavioral    Depression with anxiety    Relevant Medications    ALPRAZolam (XANAX) 0.5 MG tablet    Borderline personality disorder (H)     Other Visit Diagnoses       Acne vulgaris    -  Primary    Relevant Medications    spironolactone (ALDACTONE) 25 MG tablet    Anxiety        Relevant Medications    ALPRAZolam (XANAX) 0.5 MG tablet    Attention deficit hyperactivity disorder (ADHD), combined type        Relevant Medications    lisdexamfetamine (VYVANSE) 30 MG capsule        Due to the worsening acne, will do a trial of spironolactone.  She is not interested in restarting birth control quite yet.  They do not think that they are able to get pregnant due to 's cancer but would be happy if they did get pregnant.    [facility] was reviewed and as expected.  Xanax 0.5 mg prescribed to be taken nightly as needed.  30 tablets with 6 refills was provided.    ADHD, will do a trial of Vyvanse 30 mg daily to see if this will be more beneficial in treating her ADHD symptoms.    She is likely going to need treatment for depression and anxiety/borderline personality disorder.  Hesitate to start any new medications at this time due to 2 new medications being initiated today.  She is also struggled with sensitivity to multiple SSRIs.  Plan to talk to psychiatry for other recommendations for management. After conversation with psych NP would consider latuda or cymbalta.     She will plan to follow-up in 2 to 3 weeks for recheck, sooner if she is having concerns.  She is not having any suicidal or homicidal ideations, she does report that she is safe.    Discussion of management or test interpretation with external physician/other qualified healthcare professional/appropriate source - psych np  Prescription drug management         Depression Screening Follow Up        12/14/2023     1:01 PM   PHQ   PHQ-9 Total Score 12   Q9: Thoughts of better off  dead/self-harm past 2 weeks Not at all         Follow Up Actions Taken  Crisis resource information provided in After Visit Summary  Medications adjusted, close follow-up          Return in about 2 years (around 12/14/2025) for with me anxiety.    MIGDALIA Tan [facility]    Subjective   Nicky is a 28 year old, presenting for the following health issues:  Medication Therapy Management      History of Present Illness       Reason for visit:  Med review    She eats 2-3 servings of fruits and vegetables daily.She consumes 1 sweetened beverage(s) daily.She exercises with enough effort to increase her heart rate 10 to 19 minutes per day.  She exercises with enough effort to increase her heart rate 3 or less days per week. She is missing 4 dose(s) of medications per week.     She presents to clinic today to discuss multiple concerns.  She feels that she is having worsening anxiety and depression.  Her  is having issues with his recent transplant although remains cancer free.  He has had a recent hospital stay which has put stress on her and the family.  She does report that she continues to gain weight.  She stopped birth control previously but has noticed increased mood swings, acne and weight gain.  Periods are every 20 to 32 days and last approximately 6 days.  The first couple days are heavy, no significant cramping.  No history of ovarian cyst.  She has been trying to change diet and exercise but has struggled to be consistent.  She did approximately 10 weeks of stopping soda but did not notice a significant change in her weight.  She has not been consistent taking her medications recently.  Does not feel the buspirone has been helpful.  She was consistent with taking Adderall for approximately 45 days, this seems to be working well and then she felt like it was no longer working and then stopped taking the medication.  If she takes this just as needed, she reports she does  "not sleep well at night.  She has never been on any other ADHD medications.  In regards to depression, she has been on Prozac, Zoloft and Wellbutrin in the past, these all did cause headaches.  She is wondering about having her Xanax that she can take nightly as this is helping with sleep.      Review of Systems   See above      Objective    /70   Pulse 108   Resp 16   Ht 1.651 m (5' 5\")   Wt 86.5 kg (190 lb 12.8 oz)   SpO2 98%   BMI 31.75 kg/m    Body mass index is 31.75 kg/m .  Physical Exam   GENERAL: healthy, alert and no distress  EYES: Eyes grossly normal to inspection,  NEURO: Normal strength and tone, mentation intact and speech normal  PSYCH: mentation appears normal, affect normal/tearful at times                      "

## 2023-12-14 NOTE — NURSING NOTE
Patient presents today for follow up on medication.    Medication Reconciliation Complete    Ai Barth LPN  12/14/2023 1:02 PM

## 2023-12-15 ENCOUNTER — MYC MEDICAL ADVICE (OUTPATIENT)
Dept: FAMILY MEDICINE | Facility: OTHER | Age: 28
End: 2023-12-15
Payer: COMMERCIAL

## 2024-01-11 ENCOUNTER — OFFICE VISIT (OUTPATIENT)
Dept: FAMILY MEDICINE | Facility: OTHER | Age: 29
End: 2024-01-11
Attending: NURSE PRACTITIONER
Payer: COMMERCIAL

## 2024-01-11 VITALS
BODY MASS INDEX: 31.19 KG/M2 | TEMPERATURE: 98.6 F | DIASTOLIC BLOOD PRESSURE: 72 MMHG | OXYGEN SATURATION: 99 % | HEIGHT: 65 IN | RESPIRATION RATE: 16 BRPM | WEIGHT: 187.2 LBS | SYSTOLIC BLOOD PRESSURE: 110 MMHG | HEART RATE: 92 BPM

## 2024-01-11 DIAGNOSIS — F60.3 BORDERLINE PERSONALITY DISORDER (H): ICD-10-CM

## 2024-01-11 DIAGNOSIS — F43.23 ADJUSTMENT DISORDER WITH MIXED ANXIETY AND DEPRESSED MOOD: Primary | ICD-10-CM

## 2024-01-11 DIAGNOSIS — F90.2 ATTENTION DEFICIT HYPERACTIVITY DISORDER (ADHD), COMBINED TYPE: ICD-10-CM

## 2024-01-11 PROCEDURE — G0463 HOSPITAL OUTPT CLINIC VISIT: HCPCS

## 2024-01-11 PROCEDURE — 99213 OFFICE O/P EST LOW 20 MIN: CPT | Performed by: NURSE PRACTITIONER

## 2024-01-11 RX ORDER — LISDEXAMFETAMINE DIMESYLATE 30 MG/1
30 CAPSULE ORAL DAILY
Qty: 30 CAPSULE | Refills: 0 | Status: SHIPPED | OUTPATIENT
Start: 2024-01-11 | End: 2024-02-10

## 2024-01-11 RX ORDER — LISDEXAMFETAMINE DIMESYLATE 30 MG/1
30 CAPSULE ORAL DAILY
Qty: 30 CAPSULE | Refills: 0 | Status: SHIPPED | OUTPATIENT
Start: 2024-03-13 | End: 2024-04-03

## 2024-01-11 RX ORDER — LISDEXAMFETAMINE DIMESYLATE 30 MG/1
30 CAPSULE ORAL DAILY
Qty: 30 CAPSULE | Refills: 0 | Status: SHIPPED | OUTPATIENT
Start: 2024-02-11 | End: 2024-03-12

## 2024-01-11 ASSESSMENT — PATIENT HEALTH QUESTIONNAIRE - PHQ9
SUM OF ALL RESPONSES TO PHQ QUESTIONS 1-9: 4
SUM OF ALL RESPONSES TO PHQ QUESTIONS 1-9: 4
10. IF YOU CHECKED OFF ANY PROBLEMS, HOW DIFFICULT HAVE THESE PROBLEMS MADE IT FOR YOU TO DO YOUR WORK, TAKE CARE OF THINGS AT HOME, OR GET ALONG WITH OTHER PEOPLE: NOT DIFFICULT AT ALL

## 2024-01-11 ASSESSMENT — ANXIETY QUESTIONNAIRES
6. BECOMING EASILY ANNOYED OR IRRITABLE: SEVERAL DAYS
7. FEELING AFRAID AS IF SOMETHING AWFUL MIGHT HAPPEN: SEVERAL DAYS
1. FEELING NERVOUS, ANXIOUS, OR ON EDGE: SEVERAL DAYS
IF YOU CHECKED OFF ANY PROBLEMS ON THIS QUESTIONNAIRE, HOW DIFFICULT HAVE THESE PROBLEMS MADE IT FOR YOU TO DO YOUR WORK, TAKE CARE OF THINGS AT HOME, OR GET ALONG WITH OTHER PEOPLE: SOMEWHAT DIFFICULT
2. NOT BEING ABLE TO STOP OR CONTROL WORRYING: NOT AT ALL
3. WORRYING TOO MUCH ABOUT DIFFERENT THINGS: NOT AT ALL
7. FEELING AFRAID AS IF SOMETHING AWFUL MIGHT HAPPEN: SEVERAL DAYS
8. IF YOU CHECKED OFF ANY PROBLEMS, HOW DIFFICULT HAVE THESE MADE IT FOR YOU TO DO YOUR WORK, TAKE CARE OF THINGS AT HOME, OR GET ALONG WITH OTHER PEOPLE?: SOMEWHAT DIFFICULT
GAD7 TOTAL SCORE: 3
5. BEING SO RESTLESS THAT IT IS HARD TO SIT STILL: NOT AT ALL
4. TROUBLE RELAXING: NOT AT ALL
GAD7 TOTAL SCORE: 3
GAD7 TOTAL SCORE: 3

## 2024-01-11 ASSESSMENT — ENCOUNTER SYMPTOMS: NERVOUS/ANXIOUS: 1

## 2024-01-11 NOTE — PROGRESS NOTES
Assessment & Plan   Problem List Items Addressed This Visit          Behavioral    Adjustment disorder with mixed anxiety and depressed mood - Primary    Borderline personality disorder (H)     Other Visit Diagnoses       Attention deficit hyperactivity disorder (ADHD), combined type        Relevant Medications    lisdexamfetamine (VYVANSE) 30 MG capsule    lisdexamfetamine (VYVANSE) 30 MG capsule (Start on 2/11/2024)    lisdexamfetamine (VYVANSE) 30 MG capsule (Start on 3/13/2024)        Tolerating current medications well.  [facility] was reviewed.  Refilled Vyvanse x 3 months.  Continue with other treatments.  Follow-up in 3 months for recheck and medication refills, sooner if concerns.    Prescription drug management          No follow-ups on file.    MIGDALIA Tan [facility] AND [facility]    Zandra Saunders is a 28 year old, presenting for the following health issues:  Anxiety      History of Present Illness       Reason for visit:  Med follow up    She eats 2-3 servings of fruits and vegetables daily.She consumes 1 sweetened beverage(s) daily.She exercises with enough effort to increase her heart rate 10 to 19 minutes per day.  She exercises with enough effort to increase her heart rate 3 or less days per week. She is missing 2 dose(s) of medications per week.     She presents to clinic today for follow-up on recent medication changes.  She reports the Vyvanse is working much better for her, decreased anxiety and just as effective for managing her ADHD.  She has missed approximately one third of the days despite putting it on her phone, setting pills in a visible location.  She historically has had success using a calendar that she checked off.  She is keeping this in her car but often forgets liquids.  She is planning on a job change which will have more routine.  She feels the spironolactone is helping with her acne.  Has used Xanax 3-4 times a week.  She does use occasional  "marijuana which helps with anxiety and sleep, only uses this before bed.  She feels otherwise her mood is currently doing good as her 's health is stable.    Review of Systems   Psychiatric/Behavioral:  The patient is nervous/anxious.             Objective    /72   Pulse 92   Temp 98.6  F (37  C)   Resp 16   Ht 1.651 m (5' 5\")   Wt 84.9 kg (187 lb 3.2 oz)   LMP 12/29/2023 (Exact Date)   SpO2 99%   BMI 31.15 kg/m    Body mass index is 31.15 kg/m .  Physical Exam   GENERAL: healthy, alert and no distress  EYES: Eyes grossly normal to inspection  NEURO: Normal strength and tone, mentation intact and speech normal  PSYCH: mentation appears normal, affect normal/bright                      "

## 2024-01-11 NOTE — NURSING NOTE
Patient presents today for follow up on anxiety.    Medication Reconciliation Complete    Ai Barth LPN  1/11/2024 1:24 PM

## 2024-01-11 NOTE — COMMUNITY RESOURCES LIST (ENGLISH)
01/11/2024   [facility]  N/A  For questions about this resource list or additional care needs, please contact your primary care clinic or care manager.  Phone: [phone]   Email: N/A   Address: [address] [place] [id number]   Hours: N/A        Housing       Housing search assistance  1  [facility] Distance: 23.72 miles      In-Person   [address] [place] [address]  Language: English  Hours: Mon - Sun Open 24 Hours  Fees: Free   Phone: (753) 589-1574 Email: contactus@3dim.Slingbox Website: https://www.[employer]is/hibbing-homeless-shelter     Shelter for families  2  [facility] Distance: 0.87 miles      In-Person   [address] [place] [address]  Language: English  Hours: Mon - Sun Open 24 Hours  Fees: Free   Phone: (471) 342-2190 Email: info@[employer].Slingbox Website: https://www.iiyuma/     3  [facility] Distance: 23.72 miles      In-Person   [address] [place] [address]  Language: English  Hours: Mon - Sun Open 24 Hours  Fees: Free   Phone: (580) 625-8019 Email: contactus@3dim.Slingbox Website: https://www.[employer]/hibbing-homeless-shelter     Shelter for individuals  4  [facility] Distance: 0.87 miles      In-Person   [address] [place] [address]  Language: English  Hours: Mon - Sun Open 24 Hours  Fees: Free   Phone: (125) 903-4433 Email: info@[employer].Slingbox Website: https://www.Fanergies.org/     5  [facility] Distance: 23.72 miles      In-Person   [address] [place] [address]  Language: English  Hours: Mon - Sun Open 24 Hours  Fees: Free   Phone: (868) 670-2414 Email: contactus@[employer] Website: https://www.TidePoolorg/hibbing-homeless-shelter          Important Numbers & Websites       Emergency Services   911  [facility]   311  Poison Control   (800)  559-1744  Suicide Prevention Lifeline   (154) 212-4799 (TALK)  Child Abuse Hotline   (289) 545-4535 (4-A-Child)  Sexual Assault Hotline   (644) 834-8125 (HOPE)  National Runaway Safeline   (924) 611-7175 (RUNAWAY)  All-Options Talkline   (740) 828-9081  Substance Abuse Referral   (243) 781-5192 (HELP)

## 2024-01-19 ENCOUNTER — OFFICE VISIT (OUTPATIENT)
Dept: FAMILY MEDICINE | Facility: OTHER | Age: 29
End: 2024-01-19
Attending: STUDENT IN AN ORGANIZED HEALTH CARE EDUCATION/TRAINING PROGRAM
Payer: COMMERCIAL

## 2024-01-19 VITALS
BODY MASS INDEX: 30.74 KG/M2 | RESPIRATION RATE: 24 BRPM | DIASTOLIC BLOOD PRESSURE: 76 MMHG | HEART RATE: 106 BPM | OXYGEN SATURATION: 98 % | SYSTOLIC BLOOD PRESSURE: 138 MMHG | TEMPERATURE: 98.4 F | WEIGHT: 184.5 LBS | HEIGHT: 65 IN

## 2024-01-19 DIAGNOSIS — J45.41 MODERATE PERSISTENT REACTIVE AIRWAY DISEASE WITH ACUTE EXACERBATION: Primary | ICD-10-CM

## 2024-01-19 DIAGNOSIS — R05.1 ACUTE COUGH: ICD-10-CM

## 2024-01-19 LAB
FLUAV RNA SPEC QL NAA+PROBE: NEGATIVE
FLUBV RNA RESP QL NAA+PROBE: NEGATIVE
RSV RNA SPEC NAA+PROBE: NEGATIVE
SARS-COV-2 RNA RESP QL NAA+PROBE: NEGATIVE

## 2024-01-19 PROCEDURE — 99214 OFFICE O/P EST MOD 30 MIN: CPT | Performed by: STUDENT IN AN ORGANIZED HEALTH CARE EDUCATION/TRAINING PROGRAM

## 2024-01-19 PROCEDURE — 87637 SARSCOV2&INF A&B&RSV AMP PRB: CPT | Mod: ZL | Performed by: STUDENT IN AN ORGANIZED HEALTH CARE EDUCATION/TRAINING PROGRAM

## 2024-01-19 PROCEDURE — G0463 HOSPITAL OUTPT CLINIC VISIT: HCPCS

## 2024-01-19 RX ORDER — ALBUTEROL SULFATE 90 UG/1
2 AEROSOL, METERED RESPIRATORY (INHALATION) EVERY 6 HOURS PRN
Qty: 18 G | Refills: 3 | Status: SHIPPED | OUTPATIENT
Start: 2024-01-19

## 2024-01-19 RX ORDER — PREDNISONE 20 MG/1
40 TABLET ORAL DAILY
Qty: 10 TABLET | Refills: 0 | Status: SHIPPED | OUTPATIENT
Start: 2024-01-19 | End: 2024-01-24

## 2024-01-19 RX ORDER — AZITHROMYCIN 250 MG/1
TABLET, FILM COATED ORAL
Qty: 6 TABLET | Refills: 0 | Status: SHIPPED | OUTPATIENT
Start: 2024-01-19 | End: 2024-01-24

## 2024-01-19 RX ORDER — BENZONATATE 100 MG/1
100 CAPSULE ORAL 3 TIMES DAILY PRN
Qty: 30 CAPSULE | Refills: 3 | Status: SHIPPED | OUTPATIENT
Start: 2024-01-19

## 2024-01-19 ASSESSMENT — ENCOUNTER SYMPTOMS: COUGH: 1

## 2024-01-19 ASSESSMENT — PAIN SCALES - GENERAL: PAINLEVEL: MILD PAIN (3)

## 2024-01-19 NOTE — NURSING NOTE
"Medication Reconciliation: Complete    Chyna Quintanilla LPN  1/19/2024 10:59 AM  Chief Complaint   Patient presents with    Cough     Fatigue, emesis, throat pain, headaches, chest congestion x 5 days       Initial /76 (BP Location: Right arm, Patient Position: Sitting, Cuff Size: Adult Regular)   Pulse 106   Temp 98.4  F (36.9  C) (Tympanic)   Resp 24   Ht 1.651 m (5' 5\")   Wt 83.7 kg (184 lb 8 oz)   LMP 12/29/2023 (Exact Date)   SpO2 98%   BMI 30.70 kg/m   Estimated body mass index is 30.7 kg/m  as calculated from the following:    Height as of this encounter: 1.651 m (5' 5\").    Weight as of this encounter: 83.7 kg (184 lb 8 oz).  Medication Review: complete    The next two questions are to help us understand your food security.  If you are feeling you need any assistance in this area, we have resources available to support you today.          1/11/2024   SDOH- Food Insecurity   Within the past 12 months, did you worry that your food would run out before you got money to buy more? N   Within the past 12 months, did the food you bought just not last and you didn t have money to get more? N         Health Care Directive:  Patient does not have a Health Care Directive or Living Will: Discussed advance care planning with patient; however, patient declined at this time.    Chyna Quintanilla LPN      " Please see completed report in cardiology procedures.

## 2024-01-19 NOTE — PROGRESS NOTES
Assessment & Plan   Problem List Items Addressed This Visit    None  Visit Diagnoses       Moderate persistent reactive airway disease with acute exacerbation    -  Primary    Relevant Medications    azithromycin (ZITHROMAX) 250 MG tablet    albuterol (PROAIR HFA/PROVENTIL HFA/VENTOLIN HFA) 108 (90 Base) MCG/ACT inhaler    benzonatate (TESSALON) 100 MG capsule    predniSONE (DELTASONE) 20 MG tablet    Acute cough        Relevant Medications    albuterol (PROAIR HFA/PROVENTIL HFA/VENTOLIN HFA) 108 (90 Base) MCG/ACT inhaler    Other Relevant Orders    Symptomatic Influenza A/B, RSV, & SARS-CoV2 PCR (COVID-19) Nose           Diffuse expiratory wheezes and on going coughing in clinic. Concern that this is more of an exacerbation of underlying asthma she had as a kid. Reassuring normal O2.   Given albuterol Rx, steroid burst, and zpak (as that previously works though this does not sound bacterial). Rx for tessalon.   Multiplex today as her  is immunocompromised and may benefit from tamiflu if she is positive, though she doesn't have other sick symptoms beyond cough   We discussed when her symptoms have resolved discussing with PCP lung function testing at some point to reassess for asthma component                 No follow-ups on file.      Subjective   Nicky is a 28 year old, presenting for the following health issues:  Cough (Fatigue, emesis, throat pain, headaches, chest congestion x 5 days)    Cough       Cough   - 5 days ago cough started  - increased sob and wheezing   - no fevers  - throat irritated from coughing   - increase sputum production when coughing   - no one else sick   -  immunosuppressed   - no environmental allergies  - had asthma as a kid  - notes she gets bronchitis yearly               Review of Systems  Constitutional, HEENT, cardiovascular, pulmonary, gi and gu systems are negative, except as otherwise noted.      Objective    /76 (BP Location: Right arm, Patient  "Position: Sitting, Cuff Size: Adult Regular)   Pulse 106   Temp 98.4  F (36.9  C) (Tympanic)   Resp 24   Ht 1.651 m (5' 5\")   Wt 83.7 kg (184 lb 8 oz)   LMP 12/29/2023 (Exact Date)   SpO2 98%   BMI 30.70 kg/m    Body mass index is 30.7 kg/m .  Physical Exam   GENERAL: alert and no distress  EYES: Eyes grossly normal to inspection, PERRL and conjunctivae and sclerae normal  HENT: ear canals and TM's normal, nose and mouth without ulcers or lesions  RESP: expiratory wheezes throughout  CV: regular rate and rhythm, normal S1 S2, no S3 or S4, no murmur, click or rub, no peripheral edema   PSYCH: mentation appears normal, affect normal/bright    No results found for this or any previous visit (from the past 24 hour(s)).        Signed Electronically by: Tony Martino MD    "

## 2024-02-06 ENCOUNTER — MYC MEDICAL ADVICE (OUTPATIENT)
Dept: FAMILY MEDICINE | Facility: OTHER | Age: 29
End: 2024-02-06
Payer: COMMERCIAL

## 2024-02-06 DIAGNOSIS — L70.0 ACNE VULGARIS: ICD-10-CM

## 2024-02-07 RX ORDER — SPIRONOLACTONE 50 MG/1
50 TABLET, FILM COATED ORAL DAILY
Qty: 90 TABLET | Refills: 1 | Status: SHIPPED | OUTPATIENT
Start: 2024-02-07 | End: 2024-04-03

## 2024-04-02 ENCOUNTER — OFFICE VISIT (OUTPATIENT)
Dept: FAMILY MEDICINE | Facility: OTHER | Age: 29
End: 2024-04-02
Attending: NURSE PRACTITIONER
Payer: COMMERCIAL

## 2024-04-02 VITALS
TEMPERATURE: 98.2 F | WEIGHT: 185.6 LBS | RESPIRATION RATE: 20 BRPM | SYSTOLIC BLOOD PRESSURE: 120 MMHG | HEIGHT: 65 IN | BODY MASS INDEX: 30.92 KG/M2 | DIASTOLIC BLOOD PRESSURE: 76 MMHG | OXYGEN SATURATION: 99 % | HEART RATE: 76 BPM

## 2024-04-02 DIAGNOSIS — Z00.00 ROUTINE GENERAL MEDICAL EXAMINATION AT A HEALTH CARE FACILITY: Primary | ICD-10-CM

## 2024-04-02 DIAGNOSIS — Z30.09 BIRTH CONTROL COUNSELING: ICD-10-CM

## 2024-04-02 DIAGNOSIS — E66.9 OBESITY (BMI 30-39.9): ICD-10-CM

## 2024-04-02 DIAGNOSIS — F90.2 ATTENTION DEFICIT HYPERACTIVITY DISORDER (ADHD), COMBINED TYPE: ICD-10-CM

## 2024-04-02 DIAGNOSIS — Z13.1 SCREENING FOR DIABETES MELLITUS: ICD-10-CM

## 2024-04-02 DIAGNOSIS — E66.811 CLASS 1 OBESITY DUE TO EXCESS CALORIES WITHOUT SERIOUS COMORBIDITY WITH BODY MASS INDEX (BMI) OF 30.0 TO 30.9 IN ADULT: ICD-10-CM

## 2024-04-02 DIAGNOSIS — Z12.4 CERVICAL CANCER SCREENING: ICD-10-CM

## 2024-04-02 DIAGNOSIS — E66.09 CLASS 1 OBESITY DUE TO EXCESS CALORIES WITHOUT SERIOUS COMORBIDITY WITH BODY MASS INDEX (BMI) OF 30.0 TO 30.9 IN ADULT: ICD-10-CM

## 2024-04-02 DIAGNOSIS — Z13.29 SCREENING FOR THYROID DISORDER: ICD-10-CM

## 2024-04-02 DIAGNOSIS — K58.2 IRRITABLE BOWEL SYNDROME WITH BOTH CONSTIPATION AND DIARRHEA: ICD-10-CM

## 2024-04-02 LAB
ALBUMIN SERPL BCG-MCNC: 4.8 G/DL (ref 3.5–5.2)
ALP SERPL-CCNC: 87 U/L (ref 40–150)
ALT SERPL W P-5'-P-CCNC: 20 U/L (ref 0–50)
ANION GAP SERPL CALCULATED.3IONS-SCNC: 10 MMOL/L (ref 7–15)
AST SERPL W P-5'-P-CCNC: 22 U/L (ref 0–45)
BASOPHILS # BLD AUTO: 0.1 10E3/UL (ref 0–0.2)
BASOPHILS NFR BLD AUTO: 1 %
BILIRUB SERPL-MCNC: 0.2 MG/DL
BUN SERPL-MCNC: 15.9 MG/DL (ref 6–20)
CALCIUM SERPL-MCNC: 9.9 MG/DL (ref 8.6–10)
CHLORIDE SERPL-SCNC: 104 MMOL/L (ref 98–107)
CREAT SERPL-MCNC: 0.79 MG/DL (ref 0.51–0.95)
DEPRECATED HCO3 PLAS-SCNC: 25 MMOL/L (ref 22–29)
EGFRCR SERPLBLD CKD-EPI 2021: >90 ML/MIN/1.73M2
EOSINOPHIL # BLD AUTO: 0.4 10E3/UL (ref 0–0.7)
EOSINOPHIL NFR BLD AUTO: 5 %
ERYTHROCYTE [DISTWIDTH] IN BLOOD BY AUTOMATED COUNT: 12.8 % (ref 10–15)
GLUCOSE SERPL-MCNC: 90 MG/DL (ref 70–99)
HBA1C MFR BLD: 5.4 % (ref 4–6.2)
HCG UR QL: NEGATIVE
HCT VFR BLD AUTO: 45.2 % (ref 35–47)
HGB BLD-MCNC: 14.7 G/DL (ref 11.7–15.7)
IMM GRANULOCYTES # BLD: 0 10E3/UL
IMM GRANULOCYTES NFR BLD: 0 %
LYMPHOCYTES # BLD AUTO: 2.7 10E3/UL (ref 0.8–5.3)
LYMPHOCYTES NFR BLD AUTO: 33 %
MCH RBC QN AUTO: 28.3 PG (ref 26.5–33)
MCHC RBC AUTO-ENTMCNC: 32.5 G/DL (ref 31.5–36.5)
MCV RBC AUTO: 87 FL (ref 78–100)
MONOCYTES # BLD AUTO: 0.4 10E3/UL (ref 0–1.3)
MONOCYTES NFR BLD AUTO: 5 %
NEUTROPHILS # BLD AUTO: 4.6 10E3/UL (ref 1.6–8.3)
NEUTROPHILS NFR BLD AUTO: 56 %
NRBC # BLD AUTO: 0 10E3/UL
NRBC BLD AUTO-RTO: 0 /100
PLATELET # BLD AUTO: 423 10E3/UL (ref 150–450)
POTASSIUM SERPL-SCNC: 4.5 MMOL/L (ref 3.4–5.3)
PROT SERPL-MCNC: 8.2 G/DL (ref 6.4–8.3)
RBC # BLD AUTO: 5.2 10E6/UL (ref 3.8–5.2)
SODIUM SERPL-SCNC: 139 MMOL/L (ref 135–145)
TSH SERPL DL<=0.005 MIU/L-ACNC: 2.73 UIU/ML (ref 0.3–4.2)
WBC # BLD AUTO: 8.3 10E3/UL (ref 4–11)

## 2024-04-02 PROCEDURE — 82247 BILIRUBIN TOTAL: CPT | Mod: ZL | Performed by: NURSE PRACTITIONER

## 2024-04-02 PROCEDURE — 83036 HEMOGLOBIN GLYCOSYLATED A1C: CPT | Mod: ZL | Performed by: NURSE PRACTITIONER

## 2024-04-02 PROCEDURE — 81025 URINE PREGNANCY TEST: CPT | Mod: ZL | Performed by: NURSE PRACTITIONER

## 2024-04-02 PROCEDURE — 99395 PREV VISIT EST AGE 18-39: CPT | Performed by: NURSE PRACTITIONER

## 2024-04-02 PROCEDURE — 99213 OFFICE O/P EST LOW 20 MIN: CPT | Mod: 25 | Performed by: NURSE PRACTITIONER

## 2024-04-02 PROCEDURE — 84443 ASSAY THYROID STIM HORMONE: CPT | Mod: ZL | Performed by: NURSE PRACTITIONER

## 2024-04-02 PROCEDURE — 85049 AUTOMATED PLATELET COUNT: CPT | Mod: ZL | Performed by: NURSE PRACTITIONER

## 2024-04-02 PROCEDURE — G0123 SCREEN CERV/VAG THIN LAYER: HCPCS | Performed by: NURSE PRACTITIONER

## 2024-04-02 PROCEDURE — 36415 COLL VENOUS BLD VENIPUNCTURE: CPT | Mod: ZL | Performed by: NURSE PRACTITIONER

## 2024-04-02 RX ORDER — DEXTROAMPHETAMINE SACCHARATE, AMPHETAMINE ASPARTATE, DEXTROAMPHETAMINE SULFATE AND AMPHETAMINE SULFATE 2.5; 2.5; 2.5; 2.5 MG/1; MG/1; MG/1; MG/1
10 TABLET ORAL DAILY
Qty: 30 TABLET | Refills: 0 | Status: SHIPPED | OUTPATIENT
Start: 2024-04-02 | End: 2024-05-02

## 2024-04-02 RX ORDER — DEXTROAMPHETAMINE SACCHARATE, AMPHETAMINE ASPARTATE, DEXTROAMPHETAMINE SULFATE AND AMPHETAMINE SULFATE 5; 5; 5; 5 MG/1; MG/1; MG/1; MG/1
20 TABLET ORAL DAILY
Qty: 30 TABLET | Refills: 0 | Status: SHIPPED | OUTPATIENT
Start: 2024-06-03 | End: 2024-07-03

## 2024-04-02 RX ORDER — DEXTROAMPHETAMINE SACCHARATE, AMPHETAMINE ASPARTATE, DEXTROAMPHETAMINE SULFATE AND AMPHETAMINE SULFATE 5; 5; 5; 5 MG/1; MG/1; MG/1; MG/1
20 TABLET ORAL DAILY
Qty: 30 TABLET | Refills: 0 | Status: SHIPPED | OUTPATIENT
Start: 2024-05-03 | End: 2024-06-02

## 2024-04-02 RX ORDER — DEXTROAMPHETAMINE SACCHARATE, AMPHETAMINE ASPARTATE, DEXTROAMPHETAMINE SULFATE AND AMPHETAMINE SULFATE 2.5; 2.5; 2.5; 2.5 MG/1; MG/1; MG/1; MG/1
10 TABLET ORAL DAILY
Qty: 30 TABLET | Refills: 0 | Status: SHIPPED | OUTPATIENT
Start: 2024-06-03 | End: 2024-07-03

## 2024-04-02 RX ORDER — DEXTROAMPHETAMINE SACCHARATE, AMPHETAMINE ASPARTATE, DEXTROAMPHETAMINE SULFATE AND AMPHETAMINE SULFATE 2.5; 2.5; 2.5; 2.5 MG/1; MG/1; MG/1; MG/1
10 TABLET ORAL DAILY
Qty: 30 TABLET | Refills: 0 | Status: SHIPPED | OUTPATIENT
Start: 2024-05-03 | End: 2024-06-02

## 2024-04-02 RX ORDER — DEXTROAMPHETAMINE SACCHARATE, AMPHETAMINE ASPARTATE, DEXTROAMPHETAMINE SULFATE AND AMPHETAMINE SULFATE 5; 5; 5; 5 MG/1; MG/1; MG/1; MG/1
20 TABLET ORAL DAILY
Qty: 30 TABLET | Refills: 0 | Status: SHIPPED | OUTPATIENT
Start: 2024-04-02 | End: 2024-05-02

## 2024-04-02 SDOH — HEALTH STABILITY: PHYSICAL HEALTH: ON AVERAGE, HOW MANY DAYS PER WEEK DO YOU ENGAGE IN MODERATE TO STRENUOUS EXERCISE (LIKE A BRISK WALK)?: 5 DAYS

## 2024-04-02 ASSESSMENT — ANXIETY QUESTIONNAIRES
7. FEELING AFRAID AS IF SOMETHING AWFUL MIGHT HAPPEN: SEVERAL DAYS
1. FEELING NERVOUS, ANXIOUS, OR ON EDGE: SEVERAL DAYS
4. TROUBLE RELAXING: MORE THAN HALF THE DAYS
8. IF YOU CHECKED OFF ANY PROBLEMS, HOW DIFFICULT HAVE THESE MADE IT FOR YOU TO DO YOUR WORK, TAKE CARE OF THINGS AT HOME, OR GET ALONG WITH OTHER PEOPLE?: SOMEWHAT DIFFICULT
3. WORRYING TOO MUCH ABOUT DIFFERENT THINGS: SEVERAL DAYS
IF YOU CHECKED OFF ANY PROBLEMS ON THIS QUESTIONNAIRE, HOW DIFFICULT HAVE THESE PROBLEMS MADE IT FOR YOU TO DO YOUR WORK, TAKE CARE OF THINGS AT HOME, OR GET ALONG WITH OTHER PEOPLE: SOMEWHAT DIFFICULT
2. NOT BEING ABLE TO STOP OR CONTROL WORRYING: SEVERAL DAYS
GAD7 TOTAL SCORE: 10
6. BECOMING EASILY ANNOYED OR IRRITABLE: NEARLY EVERY DAY
7. FEELING AFRAID AS IF SOMETHING AWFUL MIGHT HAPPEN: SEVERAL DAYS
5. BEING SO RESTLESS THAT IT IS HARD TO SIT STILL: SEVERAL DAYS
GAD7 TOTAL SCORE: 10

## 2024-04-02 ASSESSMENT — ASTHMA QUESTIONNAIRES
QUESTION_3 LAST FOUR WEEKS HOW OFTEN DID YOUR ASTHMA SYMPTOMS (WHEEZING, COUGHING, SHORTNESS OF BREATH, CHEST TIGHTNESS OR PAIN) WAKE YOU UP AT NIGHT OR EARLIER THAN USUAL IN THE MORNING: ONCE A WEEK
ACT_TOTALSCORE: 22
QUESTION_2 LAST FOUR WEEKS HOW OFTEN HAVE YOU HAD SHORTNESS OF BREATH: NOT AT ALL
QUESTION_1 LAST FOUR WEEKS HOW MUCH OF THE TIME DID YOUR ASTHMA KEEP YOU FROM GETTING AS MUCH DONE AT WORK, SCHOOL OR AT HOME: NONE OF THE TIME
ACT_TOTALSCORE: 22
QUESTION_4 LAST FOUR WEEKS HOW OFTEN HAVE YOU USED YOUR RESCUE INHALER OR NEBULIZER MEDICATION (SUCH AS ALBUTEROL): NOT AT ALL
QUESTION_5 LAST FOUR WEEKS HOW WOULD YOU RATE YOUR ASTHMA CONTROL: WELL CONTROLLED

## 2024-04-02 ASSESSMENT — SOCIAL DETERMINANTS OF HEALTH (SDOH): HOW OFTEN DO YOU GET TOGETHER WITH FRIENDS OR RELATIVES?: ONCE A WEEK

## 2024-04-02 NOTE — NURSING NOTE
Patient presents today for annual physical and to follow up on other concerns.    Medication Reconciliation Complete    Ai Barth LPN  4/2/2024 8:40 AM

## 2024-04-02 NOTE — PROGRESS NOTES
Preventive Care Visit  [facility]  MIGDALIA Tan CNP, Nurse Practitioner - Family  Apr 2, 2024      Assessment & Plan   Problem List Items Addressed This Visit    None  Visit Diagnoses       Routine general medical examination at a health care facility    -  Primary    Relevant Orders    Comprehensive Metabolic Panel (Completed)    Attention deficit hyperactivity disorder (ADHD), combined type        Relevant Medications    amphetamine-dextroamphetamine (ADDERALL) 20 MG tablet    amphetamine-dextroamphetamine (ADDERALL) 20 MG tablet (Start on 5/3/2024)    amphetamine-dextroamphetamine (ADDERALL) 20 MG tablet (Start on 6/3/2024)    amphetamine-dextroamphetamine (ADDERALL) 10 MG tablet    amphetamine-dextroamphetamine (ADDERALL) 10 MG tablet (Start on 5/3/2024)    amphetamine-dextroamphetamine (ADDERALL) 10 MG tablet (Start on 6/3/2024)    Cervical cancer screening        Relevant Orders    Pap Screen reflex to HPV if ASCUS - recommended age 25 - 29 years    Obesity (BMI 30-39.9)        Relevant Medications    amphetamine-dextroamphetamine (ADDERALL) 20 MG tablet    amphetamine-dextroamphetamine (ADDERALL) 20 MG tablet (Start on 5/3/2024)    amphetamine-dextroamphetamine (ADDERALL) 20 MG tablet (Start on 6/3/2024)    amphetamine-dextroamphetamine (ADDERALL) 10 MG tablet    amphetamine-dextroamphetamine (ADDERALL) 10 MG tablet (Start on 5/3/2024)    amphetamine-dextroamphetamine (ADDERALL) 10 MG tablet (Start on 6/3/2024)    Other Relevant Orders    Comprehensive Metabolic Panel (Completed)    CBC and Differential (Completed)    Screening for thyroid disorder        Relevant Orders    TSH Reflex GH (Completed)    Screening for diabetes mellitus        Relevant Orders    Hemoglobin A1c (Completed)    Birth control counseling        Relevant Medications    norgestrel-ethinyl estradiol (LO/OVRAL) 0.3-30 MG-MCG tablet    Other Relevant Orders    Pregnancy, Urine (HCG) (Completed)    Irritable  "bowel syndrome with both constipation and diarrhea        Relevant Medications    norgestrel-ethinyl estradiol (LO/OVRAL) 0.3-30 MG-MCG tablet    Class 1 obesity due to excess calories without serious comorbidity with body mass index (BMI) of 30.0 to 30.9 in adult        Relevant Medications    amphetamine-dextroamphetamine (ADDERALL) 20 MG tablet    amphetamine-dextroamphetamine (ADDERALL) 20 MG tablet (Start on 5/3/2024)    amphetamine-dextroamphetamine (ADDERALL) 20 MG tablet (Start on 6/3/2024)    amphetamine-dextroamphetamine (ADDERALL) 10 MG tablet    amphetamine-dextroamphetamine (ADDERALL) 10 MG tablet (Start on 5/3/2024)    amphetamine-dextroamphetamine (ADDERALL) 10 MG tablet (Start on 6/3/2024)        Labs updated today including CMP, TSH, A1c and CBC.  This was done due to weight gain, screening as well as upcoming consultation for weight loss medication.  [facility] reviewed and as expected.  Refilled Adderall XR 20 mg daily and Adderall immediate release 10 mg daily as needed for 3 months.  She is wanting to restart birth control to see if this will help with some of her symptoms noted below, urine pregnancy negative, oral contraception ordered.  Discussed irritable bowel syndrome, dietary changes.  Pap smear obtained, we will follow-up with results when available  Follow-up in 3 months for medication management, sooner if concerns.      Patient has been advised of split billing requirements and indicates understanding: Yes  Review of the result(s) of each unique test - as above  Ordering of each unique test  Prescription drug management       BMI  Estimated body mass index is 30.89 kg/m  as calculated from the following:    Height as of this encounter: 1.651 m (5' 5\").    Weight as of this encounter: 84.2 kg (185 lb 9.6 oz).   Weight management plan: Discussed healthy diet and exercise guidelines    Counseling  Appropriate preventive services were discussed with this patient, including applicable " screening as appropriate for fall prevention, nutrition, physical activity, Tobacco-use cessation, weight loss and cognition.  Checklist reviewing preventive services available has been given to the patient.  Reviewed patient's diet, addressing concerns and/or questions.         Return in about 53 weeks (around 4/8/2025) for Annual Wellness Visit.      Zandra Saunders is a 28 year old, presenting for the following:  Physical         Health Care Directive  Patient does not have a Health Care Directive or Living Will:     HPI    She comes into clinic today for annual wellness exam.  She does have a few concerns that she would like to discuss.  In January 2023 she stopped birth control.  Her cycle has been 27 to 29 days, has had a couple at 23 days.  Typically has 2 days a heavy period and 3 days of spotting.  She has noticed increased weight gain, dry skin and acne.  Has been on spironolactone for acne but has since stopped medication.  She has noted some increased mood swings.  Does have abdominal discomfort, irritable bowel symptoms including diarrhea that is watery and some intermittent constipation.  Over the weekend she has had some suprapubic pain for the past 3 days, severe for approximately 5 hours, thinking she may be ovulating.  She was not having any urinary symptoms, no vaginal symptoms, no nausea or vomiting, constipation or diarrhea.    She continues to use Vyvanse for ADHD management, due to change in insurance this has become cost prohibitive and she is wondering about switching back to Adderall.  She has done well with this in the past.        4/2/2024   General Health   How would you rate your overall physical health? (!) FAIR   Feel stress (tense, anxious, or unable to sleep) To some extent   (!) STRESS CONCERN      4/2/2024   Nutrition   Three or more servings of calcium each day? Yes   Diet: Regular (no restrictions)   How many servings of fruit and vegetables per day? (!) 2-3   How many  sweetened beverages each day? 0-1         4/2/2024   Exercise   Days per week of moderate/strenous exercise 5 days         4/2/2024   Social Factors   Frequency of gathering with friends or relatives Once a week   Worry food won't last until get money to buy more No   Food not last or not have enough money for food? No   Do you have housing?  Yes   Are you worried about losing your housing? No   Lack of transportation? No   Unable to get utilities (heat,electricity)? No         4/2/2024   Dental   Dentist two times every year? Yes            Today's PHQ-2 Score:       4/2/2024     8:34 AM   PHQ-2 ( 1999 [employer])   Q1: Little interest or pleasure in doing things 0   Q2: Feeling down, depressed or hopeless 0   PHQ-2 Score 0   Q1: Little interest or pleasure in doing things Not at all   Q2: Feeling down, depressed or hopeless Not at all   PHQ-2 Score 0           4/2/2024   Substance Use   Alcohol more than 3/day or more than 7/wk No   Do you use any other substances recreationally? No     Social History     Tobacco Use    Smoking status: Never    Smokeless tobacco: Never   Vaping Use    Vaping Use: Never used   Substance Use Topics    Alcohol use: Not Currently     Comment: rare - 3 times per year    Drug use: Not Currently          Mammogram Screening - Patient under 40 years of age: Routine Mammogram Screening not recommended.         4/2/2024   STI Screening   New sexual partner(s) since last STI/HIV test? No     History of abnormal Pap smear: NO - age 21-29 PAP every 3 years recommended        3/17/2021     9:14 AM   PAP / HPV   PAP (Historical) NIL            4/2/2024   Contraception/Family Planning   Questions about contraception or family planning No        Reviewed and updated as needed this visit by Provider   Tobacco  Allergies  Meds  Problems  Med Hx  Surg Hx  Fam Hx            Past Medical History:   Diagnosis Date    Chronic cough 09/19/2002    resolved    Closed fracture of lower end of radius  2006    L arm    History of early menarche 2007    Preeclampsia 2019    Pregnancy         Subacute endomyometritis 2017     Past Surgical History:   Procedure Laterality Date    DILATION AND CURETTAGE N/A 2017    Dr. Hazel; [facility] - retained placenta/endometritis     Patient Active Problem List   Diagnosis    Allergic rhinitis due to pollen    Depression with anxiety    Dysmenorrhea    Idiopathic scoliosis    Anterior shin splints    Plantar warts    Adjustment disorder with mixed anxiety and depressed mood    Borderline personality disorder (H)     Past Surgical History:   Procedure Laterality Date    DILATION AND CURETTAGE N/A 2017    Dr. Hazel; [facility] - retained placenta/endometritis       Social History     Tobacco Use    Smoking status: Never    Smokeless tobacco: Never   Substance Use Topics    Alcohol use: Not Currently     Comment: rare - 3 times per year     Family History   Problem Relation Age of Onset    GERD Mother     GERD Maternal Grandmother     Ulcerative Colitis Maternal Grandfather          Current Outpatient Medications   Medication Sig Dispense Refill    albuterol (PROAIR HFA/PROVENTIL HFA/VENTOLIN HFA) 108 (90 Base) MCG/ACT inhaler Inhale 2 puffs into the lungs every 6 hours as needed for shortness of breath, wheezing or cough 18 g 3    ALPRAZolam (XANAX) 0.5 MG tablet Take 1 tablet (0.5 mg) by mouth nightly as needed for anxiety 30 tablet 5    amphetamine-dextroamphetamine (ADDERALL) 10 MG tablet Take 1 tablet (10 mg) by mouth daily for 30 days 30 tablet 0    [START ON 5/3/2024] amphetamine-dextroamphetamine (ADDERALL) 10 MG tablet Take 1 tablet (10 mg) by mouth daily for 30 days 30 tablet 0    [START ON 6/3/2024] amphetamine-dextroamphetamine (ADDERALL) 10 MG tablet Take 1 tablet (10 mg) by mouth daily for 30 days 30 tablet 0    amphetamine-dextroamphetamine (ADDERALL) 20 MG tablet Take 1 tablet (20 mg) by mouth daily for 30 days 30 tablet 0    [START ON 5/3/2024]  "amphetamine-dextroamphetamine (ADDERALL) 20 MG tablet Take 1 tablet (20 mg) by mouth daily for 30 days 30 tablet 0    [START ON 6/3/2024] amphetamine-dextroamphetamine (ADDERALL) 20 MG tablet Take 1 tablet (20 mg) by mouth daily for 30 days 30 tablet 0    benzonatate (TESSALON) 100 MG capsule Take 1 capsule (100 mg) by mouth 3 times daily as needed for cough 30 capsule 3    EPINEPHrine (ANY BX GENERIC EQUIV) 0.3 MG/0.3ML injection 2-pack Inject 0.3 mLs (0.3 mg) into the muscle once as needed for anaphylaxis (Repeat in 5-15 minutes if needed) for allergic reaction for up to 1 dose. 2 each 11    norgestrel-ethinyl estradiol (LO/OVRAL) 0.3-30 MG-MCG tablet Take 1 tablet by mouth daily 84 tablet 4    triamcinolone (KENALOG) 0.1 % external cream Apply topically 2 times daily as needed for irritation 80 g 1    ubrogepant (UBRELVY) 50 MG tablet Tae 1 tablet at onset of migraine, may repeat after 2 hours. No more than 200 mg in 24 hours 16 tablet 3     Allergies   Allergen Reactions    Bermuda Grass Extract Itching    Dust Mite Extract Itching    Mold Itching    Pollen Extract Itching    Tree Nuts  [Nuts] Itching    Amoxicillin-Pot Clavulanate Diarrhea         Review of Systems  Negative other than noted above     Objective    Exam  /76   Pulse 76   Temp 98.2  F (36.8  C)   Resp 20   Ht 1.651 m (5' 5\")   Wt 84.2 kg (185 lb 9.6 oz)   SpO2 99%   BMI 30.89 kg/m     Estimated body mass index is 30.89 kg/m  as calculated from the following:    Height as of this encounter: 1.651 m (5' 5\").    Weight as of this encounter: 84.2 kg (185 lb 9.6 oz).    Physical Exam  GENERAL: alert and no distress  EYES: Eyes grossly normal to inspection, PERRL and conjunctivae and sclerae normal  HENT: ear canals and TM's normal, nose and mouth without ulcers or lesions  NECK: no adenopathy, no asymmetry, masses, or scars  RESP: lungs clear to auscultation - no rales, rhonchi or wheezes  CV: regular rate and rhythm, normal S1 S2, no S3 " or S4, no murmur, click or rub, no peripheral edema  ABDOMEN: soft, nontender, no hepatosplenomegaly, no masses and bowel sounds normal   (female) w/bimanual: normal female external genitalia, normal urethral meatus, normal vaginal mucosa, and normal cervix/adnexa/uterus without masses or discharge.  Pap smear obtained.  MS: no gross musculoskeletal defects noted, no edema  SKIN: no suspicious lesions or rashes  NEURO: Normal strength and tone, mentation intact and speech normal  PSYCH: mentation appears normal, affect normal/bright        Signed Electronically by: MIGDALIA Tan CNP

## 2024-04-02 NOTE — PATIENT INSTRUCTIONS
Preventive Care Advice   This is general advice given by our system to help you stay healthy. However, your care team may have specific advice just for you. Please talk to your care team about your preventive care needs.  Nutrition  Eat 5 or more servings of fruits and vegetables each day.  Try wheat bread, brown rice and whole grain pasta (instead of white bread, rice, and pasta).  Get enough calcium and vitamin D. Check the label on foods and aim for 100% of the RDA (recommended daily allowance).  Lifestyle  Exercise at least 150 minutes each week   (30 minutes a day, 5 days a week).  Do muscle strengthening activities 2 days a week. These help control your weight and prevent disease.  No smoking.  Wear sunscreen to prevent skin cancer.  Have a dental exam and cleaning every 6 months.  Yearly exams  See your health care team every year to talk about:  Any changes in your health.  Any medicines your care team has prescribed.  Preventive care, family planning, and ways to prevent chronic diseases.  Shots (vaccines)   HPV shots (up to age 26), if you've never had them before.  Hepatitis B shots (up to age 59), if you've never had them before.  COVID-19 shot: Get this shot when it's due.  Flu shot: Get a flu shot every year.  Tetanus shot: Get a tetanus shot every 10 years.  Pneumococcal, hepatitis A, and RSV shots: Ask your care team if you need these based on your risk.  Shingles shot (for age 50 and up).  General health tests  Diabetes screening:  Starting at age 35, Get screened for diabetes at least every 3 years.  If you are younger than age 35, ask your care team if you should be screened for diabetes.  Cholesterol test: At age 39, start having a cholesterol test every 5 years, or more often if advised.  Bone density scan (DEXA): At age 50, ask your care team if you should have this scan for osteoporosis (brittle bones).  Hepatitis C: Get tested at least once in your life.  STIs (sexually transmitted  infections)  Before age 24: Ask your care team if you should be screened for STIs.  After age 24: Get screened for STIs if you're at risk. You are at risk for STIs (including HIV) if:  You are sexually active with more than one person.  You don't use condoms every time.  You or a partner was diagnosed with a sexually transmitted infection.  If you are at risk for HIV, ask about PrEP medicine to prevent HIV.  Get tested for HIV at least once in your life, whether you are at risk for HIV or not.  Cancer screening tests  Cervical cancer screening: If you have a cervix, begin getting regular cervical cancer screening tests at age 21. Most people who have regular screenings with normal results can stop after age 65. Talk about this with your provider.  Breast cancer scan (mammogram): If you've ever had breasts, begin having regular mammograms starting at age 40. This is a scan to check for breast cancer.  Colon cancer screening: It is important to start screening for colon cancer at age 45.  Have a colonoscopy test every 10 years (or more often if you're at risk) Or, ask your provider about stool tests like a FIT test every year or Cologuard test every 3 years.  To learn more about your testing options, visit: https://[email or web address]/685251.pdf.  For help making a decision, visit: https://bit.ly/mc30587.  Prostate cancer screening test: If you have a prostate and are age 55 to 69, ask your provider if you would benefit from a yearly prostate cancer screening test.  Lung cancer screening: If you are a current or former smoker age 50 to 80, ask your care team if ongoing lung cancer screenings are right for you.  For informational purposes only. Not to replace the advice of your health care provider. Copyright   2023 [employer]. All rights reserved. Clinically reviewed by the [facility] Transitions Program. [facility] 863395 - REV 01/24.    Learning About Stress  What is stress?     Stress is your  body's response to a hard situation. Your body can have a physical, emotional, or mental response. Stress is a fact of life for most people, and it affects everyone differently. What causes stress for you may not be stressful for someone else.  A lot of things can cause stress. You may feel stress when you go on a job interview, take a test, or run a race. This kind of short-term stress is normal and even useful. It can help you if you need to work hard or react quickly. For example, stress can help you finish an important job on time.  Long-term stress is caused by ongoing stressful situations or events. Examples of long-term stress include long-term health problems, ongoing problems at work, or conflicts in your family. Long-term stress can harm your health.  How does stress affect your health?  When you are stressed, your body responds as though you are in danger. It makes hormones that speed up your heart, make you breathe faster, and give you a burst of energy. This is called the fight-or-flight stress response. If the stress is over quickly, your body goes back to normal and no harm is done.  But if stress happens too often or lasts too long, it can have bad effects. Long-term stress can make you more likely to get sick, and it can make symptoms of some diseases worse. If you tense up when you are stressed, you may develop neck, shoulder, or low back pain. Stress is linked to high blood pressure and heart disease.  Stress also harms your emotional health. It can make you ibrahim, tense, or depressed. Your relationships may suffer, and you may not do well at work or school.  What can you do to manage stress?  You can try these things to help manage stress:   Do something active. Exercise or activity can help reduce stress. Walking is a great way to get started. Even everyday activities such as housecleaning or yard work can help.  Try yoga or jazzmine chi. These techniques combine exercise and meditation. You may need  some training at first to learn them.  Do something you enjoy. For example, listen to music or go to a movie. Practice your hobby or do volunteer work.  Meditate. This can help you relax, because you are not worrying about what happened before or what may happen in the future.  Do guided imagery. Imagine yourself in any setting that helps you feel calm. You can use online videos, books, or a teacher to guide you.  Do breathing exercises. For example:  From a standing position, bend forward from the waist with your knees slightly bent. Let your arms dangle close to the floor.  Breathe in slowly and deeply as you return to a standing position. Roll up slowly and lift your head last.  Hold your breath for just a few seconds in the standing position.  Breathe out slowly and bend forward from the waist.  Let your feelings out. Talk, laugh, cry, and express anger when you need to. Talking with supportive friends or family, a counselor, or a berta leader about your feelings is a healthy way to relieve stress. Avoid discussing your feelings with people who make you feel worse.  Write. It may help to write about things that are bothering you. This helps you find out how much stress you feel and what is causing it. When you know this, you can find better ways to cope.  What can you do to prevent stress?  You might try some of these things to help prevent stress:  Manage your time. This helps you find time to do the things you want and need to do.  Get enough sleep. Your body recovers from the stresses of the day while you are sleeping.  Get support. Your family, friends, and community can make a difference in how you experience stress.  Limit your news feed. Avoid or limit time on social media or news that may make you feel stressed.  Do something active. Exercise or activity can help reduce stress. Walking is a great way to get started.  Where can you learn more?  Go to https://www.healthwise.net/patiented  Enter N032 in the  "search box to learn more about \"Learning About Stress.\"  Current as of: October 24, 2023               Content Version: 14.0    3371-7843 [employer].   Care instructions adapted under license by your healthcare professional. If you have questions about a medical condition or this instruction, always ask your healthcare professional. [employer] disclaims any warranty or liability for your use of this information.      "

## 2024-04-04 ENCOUNTER — TELEPHONE (OUTPATIENT)
Dept: FAMILY MEDICINE | Facility: OTHER | Age: 29
End: 2024-04-04
Payer: COMMERCIAL

## 2024-04-04 DIAGNOSIS — J01.90 ACUTE SINUSITIS WITH SYMPTOMS > 10 DAYS: Primary | ICD-10-CM

## 2024-04-04 LAB
BKR LAB AP GYN ADEQUACY: NORMAL
BKR LAB AP GYN INTERPRETATION: NORMAL
BKR LAB AP HPV REFLEX: NORMAL
BKR LAB AP PREVIOUS ABNORMAL: NORMAL
PATH REPORT.COMMENTS IMP SPEC: NORMAL
PATH REPORT.COMMENTS IMP SPEC: NORMAL
PATH REPORT.RELEVANT HX SPEC: NORMAL

## 2024-04-04 NOTE — TELEPHONE ENCOUNTER
Per chart review, last prescription:  azithromycin (ZITHROMAX) 250 MG tablet 6 tablet 0 1/19/2024 1/24/2024 --   Sig - Route: Take 2 tablets (500 mg) by mouth daily for 1 day, THEN 1 tablet (250 mg) daily for 4 days. - Oral     Per 1/19/24 OV note:  Diffuse expiratory wheezes and on going coughing in clinic. Concern that this is more of an exacerbation of underlying asthma she had as a kid. Reassuring normal O2.   Given albuterol Rx, steroid burst, and zpak (as that previously works though this does not sound bacterial). Rx for tessalon.   Multiplex today as her  is immunocompromised and may benefit from tamiflu if she is positive, though she doesn't have other sick symptoms beyond cough   We discussed when her symptoms have resolved discussing with PCP lung function testing at some point to reassess for asthma component       Routing to triage nurse for consideration, as last course would have been completed >2 months ago.    Chyna Ojeda RN .............. 4/4/2024  4:01 PM

## 2024-04-04 NOTE — TELEPHONE ENCOUNTER
S-(situation): patient called in requesting Z-pack. She was just seen on 4/2/24 and was sick at that time with this cold.     B-(background): gets sinus infections yearly.     A-(assessment): Has head congestion for about a week, head hurts/teeth hurts and has pressure under her eyes and they almost feel puffy. She has dark green nasal drainage as of yesterday, denies sore throat/cough/fever.   She is using saline nasal spray/afrin and severe congestion dayquil and zyrtec.     R-(recommendations): advised patient that PCP is not in clinic until Monday and an appt can be made with another provider or she could go to . Patient states she will just wait until DMO is back in office. She states if it gets too bad, she will either call for an appt or go to .     WEST CORNELIUS RN on 4/4/2024 at 4:50 PM

## 2024-04-04 NOTE — TELEPHONE ENCOUNTER
Reason for call: Medication or medication refill    Name of medication requested: Z pack - for possible sinus infection    How many days of medication do you have left? NEW    What pharmacy do you use? [facility]    Preferred method for responding to this message: Telephone Call    Phone number patient can be reached at: Cell number on file:    Telephone Information:   Mobile [phone]       If we cannot reach you directly, may we leave a detailed response at the number you provided? Yes      Patient stated she did not get better from her last appointment.          Jeanna Machado on 4/4/2024 at 3:53 PM    .

## 2024-04-08 RX ORDER — AZITHROMYCIN 250 MG/1
TABLET, FILM COATED ORAL
Qty: 6 TABLET | Refills: 0 | Status: SHIPPED | OUTPATIENT
Start: 2024-04-08 | End: 2024-04-13

## 2024-04-17 ENCOUNTER — HOSPITAL ENCOUNTER (EMERGENCY)
Facility: OTHER | Age: 29
Discharge: HOME | End: 2024-04-17
Attending: PHYSICIAN ASSISTANT
Payer: COMMERCIAL

## 2024-04-17 ENCOUNTER — APPOINTMENT (OUTPATIENT)
Dept: CT IMAGING | Facility: OTHER | Age: 29
End: 2024-04-17
Attending: PHYSICIAN ASSISTANT
Payer: COMMERCIAL

## 2024-04-17 VITALS
HEIGHT: 65 IN | SYSTOLIC BLOOD PRESSURE: 123 MMHG | BODY MASS INDEX: 30.82 KG/M2 | HEART RATE: 83 BPM | OXYGEN SATURATION: 96 % | TEMPERATURE: 98.6 F | RESPIRATION RATE: 18 BRPM | DIASTOLIC BLOOD PRESSURE: 81 MMHG | WEIGHT: 185 LBS

## 2024-04-17 DIAGNOSIS — D72.829 LEUKOCYTOSIS: ICD-10-CM

## 2024-04-17 DIAGNOSIS — R10.9 ABDOMINAL PAIN: ICD-10-CM

## 2024-04-17 DIAGNOSIS — R31.9 HEMATURIA: ICD-10-CM

## 2024-04-17 DIAGNOSIS — D72.829 LEUKOCYTOSIS, UNSPECIFIED TYPE: ICD-10-CM

## 2024-04-17 LAB
ALBUMIN SERPL BCG-MCNC: 5.1 G/DL (ref 3.5–5.2)
ALBUMIN UR-MCNC: NEGATIVE MG/DL
ALP SERPL-CCNC: 82 U/L (ref 40–150)
ALT SERPL W P-5'-P-CCNC: 17 U/L (ref 0–50)
ANION GAP SERPL CALCULATED.3IONS-SCNC: 14 MMOL/L (ref 7–15)
APPEARANCE UR: CLEAR
AST SERPL W P-5'-P-CCNC: 20 U/L (ref 0–45)
BASOPHILS # BLD AUTO: 0 10E3/UL (ref 0–0.2)
BASOPHILS NFR BLD AUTO: 0 %
BILIRUB DIRECT SERPL-MCNC: <0.2 MG/DL (ref 0–0.3)
BILIRUB SERPL-MCNC: 0.3 MG/DL
BILIRUB UR QL STRIP: NEGATIVE
BUN SERPL-MCNC: 14.9 MG/DL (ref 6–20)
CALCIUM SERPL-MCNC: 9.7 MG/DL (ref 8.6–10)
CHLORIDE SERPL-SCNC: 104 MMOL/L (ref 98–107)
COLOR UR AUTO: ABNORMAL
CREAT SERPL-MCNC: 0.88 MG/DL (ref 0.51–0.95)
DEPRECATED HCO3 PLAS-SCNC: 22 MMOL/L (ref 22–29)
EGFRCR SERPLBLD CKD-EPI 2021: >90 ML/MIN/1.73M2
EOSINOPHIL # BLD AUTO: 0.1 10E3/UL (ref 0–0.7)
EOSINOPHIL NFR BLD AUTO: 0 %
ERYTHROCYTE [DISTWIDTH] IN BLOOD BY AUTOMATED COUNT: 12.8 % (ref 10–15)
GLUCOSE SERPL-MCNC: 100 MG/DL (ref 70–99)
GLUCOSE UR STRIP-MCNC: NEGATIVE MG/DL
HCG UR QL: NEGATIVE
HCT VFR BLD AUTO: 44 % (ref 35–47)
HGB BLD-MCNC: 14.7 G/DL (ref 11.7–15.7)
HGB UR QL STRIP: ABNORMAL
HOLD SPECIMEN: NORMAL
IMM GRANULOCYTES # BLD: 0 10E3/UL
IMM GRANULOCYTES NFR BLD: 0 %
KETONES UR STRIP-MCNC: ABNORMAL MG/DL
LACTATE SERPL-SCNC: 0.8 MMOL/L (ref 0.7–2)
LEUKOCYTE ESTERASE UR QL STRIP: NEGATIVE
LIPASE SERPL-CCNC: 21 U/L (ref 13–60)
LYMPHOCYTES # BLD AUTO: 2.5 10E3/UL (ref 0.8–5.3)
LYMPHOCYTES NFR BLD AUTO: 15 %
MAGNESIUM SERPL-MCNC: 2.6 MG/DL (ref 1.7–2.3)
MCH RBC QN AUTO: 29.1 PG (ref 26.5–33)
MCHC RBC AUTO-ENTMCNC: 33.4 G/DL (ref 31.5–36.5)
MCV RBC AUTO: 87 FL (ref 78–100)
MONOCYTES # BLD AUTO: 0.7 10E3/UL (ref 0–1.3)
MONOCYTES NFR BLD AUTO: 4 %
MUCOUS THREADS #/AREA URNS LPF: PRESENT /LPF
NEUTROPHILS # BLD AUTO: 13.4 10E3/UL (ref 1.6–8.3)
NEUTROPHILS NFR BLD AUTO: 80 %
NITRATE UR QL: NEGATIVE
NRBC # BLD AUTO: 0 10E3/UL
NRBC BLD AUTO-RTO: 0 /100
PH UR STRIP: 5.5 [PH] (ref 5–9)
PLATELET # BLD AUTO: 480 10E3/UL (ref 150–450)
POTASSIUM SERPL-SCNC: 4.3 MMOL/L (ref 3.4–5.3)
PROT SERPL-MCNC: 8.6 G/DL (ref 6.4–8.3)
RBC # BLD AUTO: 5.05 10E6/UL (ref 3.8–5.2)
RBC URINE: 10 /HPF
SODIUM SERPL-SCNC: 140 MMOL/L (ref 135–145)
SP GR UR STRIP: 1.02 (ref 1–1.03)
UROBILINOGEN UR STRIP-MCNC: NORMAL MG/DL
WBC # BLD AUTO: 16.8 10E3/UL (ref 4–11)
WBC URINE: <1 /HPF

## 2024-04-17 PROCEDURE — 83605 ASSAY OF LACTIC ACID: CPT | Performed by: PHYSICIAN ASSISTANT

## 2024-04-17 PROCEDURE — 250N000011 HC RX IP 250 OP 636: Performed by: PHYSICIAN ASSISTANT

## 2024-04-17 PROCEDURE — 82248 BILIRUBIN DIRECT: CPT | Performed by: PHYSICIAN ASSISTANT

## 2024-04-17 PROCEDURE — 99285 EMERGENCY DEPT VISIT HI MDM: CPT | Mod: 25 | Performed by: PHYSICIAN ASSISTANT

## 2024-04-17 PROCEDURE — 83690 ASSAY OF LIPASE: CPT | Performed by: PHYSICIAN ASSISTANT

## 2024-04-17 PROCEDURE — 96361 HYDRATE IV INFUSION ADD-ON: CPT | Performed by: PHYSICIAN ASSISTANT

## 2024-04-17 PROCEDURE — 80053 COMPREHEN METABOLIC PANEL: CPT | Performed by: PHYSICIAN ASSISTANT

## 2024-04-17 PROCEDURE — 81001 URINALYSIS AUTO W/SCOPE: CPT | Performed by: PHYSICIAN ASSISTANT

## 2024-04-17 PROCEDURE — 36415 COLL VENOUS BLD VENIPUNCTURE: CPT | Performed by: PHYSICIAN ASSISTANT

## 2024-04-17 PROCEDURE — 74177 CT ABD & PELVIS W/CONTRAST: CPT

## 2024-04-17 PROCEDURE — 258N000003 HC RX IP 258 OP 636: Performed by: PHYSICIAN ASSISTANT

## 2024-04-17 PROCEDURE — 96375 TX/PRO/DX INJ NEW DRUG ADDON: CPT | Performed by: PHYSICIAN ASSISTANT

## 2024-04-17 PROCEDURE — 81025 URINE PREGNANCY TEST: CPT | Performed by: PHYSICIAN ASSISTANT

## 2024-04-17 PROCEDURE — 87086 URINE CULTURE/COLONY COUNT: CPT

## 2024-04-17 PROCEDURE — 81025 URINE PREGNANCY TEST: CPT | Performed by: FAMILY MEDICINE

## 2024-04-17 PROCEDURE — 99284 EMERGENCY DEPT VISIT MOD MDM: CPT | Performed by: PHYSICIAN ASSISTANT

## 2024-04-17 PROCEDURE — 81001 URINALYSIS AUTO W/SCOPE: CPT | Performed by: FAMILY MEDICINE

## 2024-04-17 PROCEDURE — 83735 ASSAY OF MAGNESIUM: CPT | Performed by: PHYSICIAN ASSISTANT

## 2024-04-17 PROCEDURE — 96374 THER/PROPH/DIAG INJ IV PUSH: CPT | Mod: XU | Performed by: PHYSICIAN ASSISTANT

## 2024-04-17 PROCEDURE — 85025 COMPLETE CBC W/AUTO DIFF WBC: CPT | Performed by: PHYSICIAN ASSISTANT

## 2024-04-17 RX ORDER — KETOROLAC TROMETHAMINE 15 MG/ML
15 INJECTION, SOLUTION INTRAMUSCULAR; INTRAVENOUS ONCE
Status: COMPLETED | OUTPATIENT
Start: 2024-04-17 | End: 2024-04-17

## 2024-04-17 RX ORDER — IOPAMIDOL 755 MG/ML
107 INJECTION, SOLUTION INTRAVASCULAR ONCE
Status: COMPLETED | OUTPATIENT
Start: 2024-04-17 | End: 2024-04-17

## 2024-04-17 RX ORDER — ONDANSETRON 4 MG/1
4 TABLET, ORALLY DISINTEGRATING ORAL EVERY 8 HOURS PRN
Qty: 5 TABLET | Refills: 0 | Status: SHIPPED | OUTPATIENT
Start: 2024-04-17

## 2024-04-17 RX ORDER — FENTANYL CITRATE 50 UG/ML
50 INJECTION, SOLUTION INTRAMUSCULAR; INTRAVENOUS ONCE
Status: COMPLETED | OUTPATIENT
Start: 2024-04-17 | End: 2024-04-17

## 2024-04-17 RX ORDER — ONDANSETRON 2 MG/ML
4 INJECTION INTRAMUSCULAR; INTRAVENOUS ONCE
Status: DISCONTINUED | OUTPATIENT
Start: 2024-04-17 | End: 2024-04-17

## 2024-04-17 RX ADMIN — SODIUM CHLORIDE 1000 ML: 9 INJECTION, SOLUTION INTRAVENOUS at 21:07

## 2024-04-17 RX ADMIN — IOPAMIDOL 107 ML: 755 INJECTION, SOLUTION INTRAVENOUS at 20:45

## 2024-04-17 RX ADMIN — KETOROLAC TROMETHAMINE 15 MG: 15 INJECTION, SOLUTION INTRAMUSCULAR; INTRAVENOUS at 20:40

## 2024-04-17 RX ADMIN — PROCHLORPERAZINE EDISYLATE 10 MG: 5 INJECTION INTRAMUSCULAR; INTRAVENOUS at 20:10

## 2024-04-17 RX ADMIN — FENTANYL CITRATE 50 MCG: 50 INJECTION INTRAMUSCULAR; INTRAVENOUS at 20:08

## 2024-04-17 ASSESSMENT — ENCOUNTER SYMPTOMS
FEVER: 0
COUGH: 0
CHILLS: 0
ABDOMINAL PAIN: 1
VOMITING: 0
SHORTNESS OF BREATH: 0
HEMATURIA: 0
NAUSEA: 1

## 2024-04-17 ASSESSMENT — ACTIVITIES OF DAILY LIVING (ADL)
ADLS_ACUITY_SCORE: 33
ADLS_ACUITY_SCORE: 35
ADLS_ACUITY_SCORE: 35

## 2024-04-17 ASSESSMENT — COLUMBIA-SUICIDE SEVERITY RATING SCALE - C-SSRS
6. HAVE YOU EVER DONE ANYTHING, STARTED TO DO ANYTHING, OR PREPARED TO DO ANYTHING TO END YOUR LIFE?: NO
2. HAVE YOU ACTUALLY HAD ANY THOUGHTS OF KILLING YOURSELF IN THE PAST MONTH?: NO
1. IN THE PAST MONTH, HAVE YOU WISHED YOU WERE DEAD OR WISHED YOU COULD GO TO SLEEP AND NOT WAKE UP?: NO

## 2024-04-17 NOTE — LETTER
April 17, 2024      To Whom It May Concern:      Nicky Hart was seen in our Emergency Department today, 04/17/24.  I expect her condition to improve over the next 1-2 days.  She may return to work when improved.    Sincerely,        MONICO Lane

## 2024-04-18 ENCOUNTER — MYC MEDICAL ADVICE (OUTPATIENT)
Dept: FAMILY MEDICINE | Facility: OTHER | Age: 29
End: 2024-04-18

## 2024-04-18 ENCOUNTER — OFFICE VISIT (OUTPATIENT)
Dept: FAMILY MEDICINE | Facility: OTHER | Age: 29
End: 2024-04-18
Attending: NURSE PRACTITIONER
Payer: COMMERCIAL

## 2024-04-18 VITALS
RESPIRATION RATE: 16 BRPM | OXYGEN SATURATION: 98 % | HEIGHT: 65 IN | DIASTOLIC BLOOD PRESSURE: 72 MMHG | HEART RATE: 92 BPM | TEMPERATURE: 97.7 F | WEIGHT: 186.2 LBS | BODY MASS INDEX: 31.02 KG/M2 | SYSTOLIC BLOOD PRESSURE: 110 MMHG

## 2024-04-18 DIAGNOSIS — D72.829 LEUKOCYTOSIS, UNSPECIFIED TYPE: Primary | ICD-10-CM

## 2024-04-18 DIAGNOSIS — R11.0 NAUSEA: ICD-10-CM

## 2024-04-18 DIAGNOSIS — R10.84 ABDOMINAL PAIN, GENERALIZED: ICD-10-CM

## 2024-04-18 PROCEDURE — 99214 OFFICE O/P EST MOD 30 MIN: CPT | Performed by: NURSE PRACTITIONER

## 2024-04-18 RX ORDER — ONDANSETRON 4 MG/1
4-8 TABLET, FILM COATED ORAL EVERY 8 HOURS PRN
Qty: 60 TABLET | Refills: 0 | Status: SHIPPED | OUTPATIENT
Start: 2024-04-18

## 2024-04-18 NOTE — ED TRIAGE NOTES
Pt c/o middle abdominal pain 7/10, severity fluctuates like cramping. Pt denies vomiting or diarrhea. Last BM today x 3 normal consistency. Pt recently had Monjaro injection yesterday at 11 and is worried this a reaction or side effect       Triage Assessment (Adult)       Row Name 04/17/24 6676          Triage Assessment    Airway WDL WDL        Respiratory WDL    Respiratory WDL WDL        Skin Circulation/Temperature WDL    Skin Circulation/Temperature WDL WDL        Cardiac WDL    Cardiac WDL WDL        Cognitive/Neuro/Behavioral WDL    Cognitive/Neuro/Behavioral WDL WDL

## 2024-04-18 NOTE — NURSING NOTE
Patient presents today for ED follow up.    Medication Reconciliation Complete    Ai Barth LPN  4/18/2024 10:03 AM

## 2024-04-18 NOTE — ED PROVIDER NOTES
History     Chief Complaint   Patient presents with    Abdominal Pain     HPI  Nicky Hart is a 28 year old female who presents to the ED for evaluation of abdominal pain. Pt c/o middle abdominal pain 7/10, severity fluctuates like cramping. Pt denies vomiting or diarrhea. Last BM today x 3 normal consistency. Pt recently had Monjaro injection yesterday at 11 and is worried this a reaction or side effect.    Allergies:  Allergies   Allergen Reactions    Bermuda Grass Extract Itching    Dust Mite Extract Itching    Mold Itching    Pollen Extract Itching    Tree Nuts  [Nuts] Itching    Amoxicillin-Pot Clavulanate Diarrhea       Problem List:    Patient Active Problem List    Diagnosis Date Noted    Borderline personality disorder (H) 01/26/2023     Priority: Medium    Anterior shin splints 02/12/2020     Priority: Medium    Plantar warts 02/12/2020     Priority: Medium    Adjustment disorder with mixed anxiety and depressed mood 02/12/2020     Priority: Medium    Allergic rhinitis due to pollen 02/28/2018     Priority: Medium     Overview:   Seasonal allergy symptoms.      Depression with anxiety 11/19/2013     Priority: Medium    Dysmenorrhea 06/10/2011     Priority: Medium    Idiopathic scoliosis 06/10/2011     Priority: Medium        Past Medical History:    Past Medical History:   Diagnosis Date    Chronic cough 09/19/2002    Closed fracture of lower end of radius 09/2006    History of early menarche 02/2007    Preeclampsia 9/6/2019    Pregnancy     Subacute endomyometritis 5/16/2017       Past Surgical History:    Past Surgical History:   Procedure Laterality Date    DILATION AND CURETTAGE N/A 05/17/2017    Dr. Hazel; [facility] - retained placenta/endometritis       Family History:    Family History   Problem Relation Age of Onset    GERD Mother     GERD Maternal Grandmother     Ulcerative Colitis Maternal Grandfather        Social History:  Marital Status:   [2]  Social History     Tobacco Use    Smoking  "status: Never    Smokeless tobacco: Never   Vaping Use    Vaping status: Never Used   Substance Use Topics    Alcohol use: Not Currently     Comment: rare - 3 times per year    Drug use: Not Currently        Medications:    ondansetron (ZOFRAN ODT) 4 MG ODT tab  albuterol (PROAIR HFA/PROVENTIL HFA/VENTOLIN HFA) 108 (90 Base) MCG/ACT inhaler  ALPRAZolam (XANAX) 0.5 MG tablet  amphetamine-dextroamphetamine (ADDERALL) 10 MG tablet  [START ON 5/3/2024] amphetamine-dextroamphetamine (ADDERALL) 10 MG tablet  [START ON 6/3/2024] amphetamine-dextroamphetamine (ADDERALL) 10 MG tablet  amphetamine-dextroamphetamine (ADDERALL) 20 MG tablet  [START ON 5/3/2024] amphetamine-dextroamphetamine (ADDERALL) 20 MG tablet  [START ON 6/3/2024] amphetamine-dextroamphetamine (ADDERALL) 20 MG tablet  benzonatate (TESSALON) 100 MG capsule  EPINEPHrine (ANY BX GENERIC EQUIV) 0.3 MG/0.3ML injection 2-pack  norgestrel-ethinyl estradiol (LO/OVRAL) 0.3-30 MG-MCG tablet  ondansetron (ZOFRAN) 4 MG tablet  triamcinolone (KENALOG) 0.1 % external cream  ubrogepant (UBRELVY) 50 MG tablet          Review of Systems   Constitutional:  Negative for chills and fever.   HENT:  Negative for congestion.    Eyes:  Negative for visual disturbance.   Respiratory:  Negative for cough and shortness of breath.    Cardiovascular:  Negative for chest pain.   Gastrointestinal:  Positive for abdominal pain and nausea. Negative for vomiting.   Genitourinary:  Negative for hematuria.   Allergic/Immunologic: Negative for immunocompromised state.   Neurological:  Negative for syncope.       Physical Exam   BP: (!) 141/105  Pulse: 101  Temp: 98.6  F (37  C)  Resp: 18  Height: 165.1 cm (5' 5\")  Weight: 83.9 kg (185 lb)  SpO2: 100 %      Physical Exam  Constitutional:       General: She is not in acute distress.     Appearance: She is well-developed. She is not diaphoretic.   HENT:      Head: Normocephalic and atraumatic.   Eyes:      General: No scleral icterus.     " Conjunctiva/sclera: Conjunctivae normal.   Cardiovascular:      Rate and Rhythm: Normal rate and regular rhythm.   Pulmonary:      Effort: Pulmonary effort is normal.      Breath sounds: Normal breath sounds.   Abdominal:      Palpations: Abdomen is soft.      Tenderness: There is generalized abdominal tenderness. There is no guarding or rebound.   Musculoskeletal:         General: No deformity.      Cervical back: Neck supple.   Lymphadenopathy:      Cervical: No cervical adenopathy.   Skin:     General: Skin is warm and dry.      Coloration: Skin is not jaundiced.      Findings: No rash.   Neurological:      General: No focal deficit present.      Mental Status: She is alert. Mental status is at baseline.   Psychiatric:         Mood and Affect: Mood normal.         Behavior: Behavior normal.         ED Course        Procedures              Critical Care time:  none               No results found for this or any previous visit (from the past 24 hour(s)).      Medications   fentaNYL (PF) (SUBLIMAZE) injection 50 mcg (50 mcg Intravenous $Given 4/17/24 2008)   prochlorperazine (COMPAZINE) injection 10 mg (10 mg Intravenous $Given 4/17/24 2010)   ketorolac (TORADOL) injection 15 mg (15 mg Intravenous $Given 4/17/24 2040)   sodium chloride 0.9% BOLUS 1,000 mL (0 mLs Intravenous Stopped 4/17/24 2204)   iopamidol (ISOVUE-370) solution 107 mL (107 mLs Intravenous $Given 4/17/24 2045)       Assessments & Plan (with Medical Decision Making)   Nontoxic but uncomfortable appearing. She reports generalized to lower abdominal pain, no guarding or rebound. VSS, afebrile.     She has leukocytosis, remainder of lab studies appear very stable.  She has hematuria.    CT abd:  No acute findings in the abdomen or pelvis.     She had been given fentanyl and Toradol as well as Compazine.  Upon reassessment, she is doing very well.  She has much improved symptoms from when arriving.  I did palpate her abdomen again and she is not  endorsing much discomfort at all.  I did discuss that she had an elevated white blood cell count is difficult to determine what is causing this currently.  I did offer further studies including pelvic ultrasound and further observation.  She says she would like to go home at this time.  we will send her home with Zofran.      Strict return precautions are given to the pt, they will return if symptoms are worsening or concerning. The pt understands and agrees with the plan and they are discharged.     Kike Blakely PA-C    I have reviewed the nursing notes.    I have reviewed the findings, diagnosis, plan and need for follow up with the patient.          Discharge Medication List as of 4/17/2024 10:04 PM        START taking these medications    Details   ondansetron (ZOFRAN ODT) 4 MG ODT tab Take 1 tablet (4 mg) by mouth every 8 hours as needed for nausea, Disp-5 tablet, R-0, [facility]             Final diagnoses:   Abdominal pain   Leukocytosis   Hematuria       4/17/2024   [facility] AND Kike Roth PA  04/18/24 3282

## 2024-04-18 NOTE — PROGRESS NOTES
Assessment & Plan   Problem List Items Addressed This Visit    None  Visit Diagnoses       Leukocytosis, unspecified type    -  Primary    Relevant Orders    Urine Culture Aerobic Bacterial    Abdominal pain, generalized        Relevant Orders    US Abdomen Complete    US Pelvis Complete without Transvaginal    Nausea        Relevant Medications    ondansetron (ZOFRAN) 4 MG tablet           Reviewed diagnosis completed in the emergency room, urine culture ordered and is pending.  Her symptoms have completely resolved today.  We discussed follow-up lab work although she does not feel she needs this today and I do agree.  It is unclear if her pain is related to the new medication or something else as she has had something similar prior to the medication being started.  She is hesitant to take the medication at this time.  She is going on vacation tomorrow, plans to hold medication until she comes home.  She will reach out to prescribing provider as well.  Zofran ordered to have on hand especially with upcoming travel.  Pelvic and abdominal ultrasound ordered for follow-up.    Review of the result(s) of each unique test - as above from ER  Ordering of each unique test  Prescription drug management     MED REC REQUIRED  Post Medication Reconciliation Status: discharge medications reconciled and changed, per note/orders      No follow-ups on file.      Zandra Saunders is a 28 year old, presenting for the following health issues:  ER F/U    History of Present Illness       Reason for visit:  Abdominal pain    She eats 2-3 servings of fruits and vegetables daily.She consumes 1 sweetened beverage(s) daily.She exercises with enough effort to increase her heart rate 20 to 29 minutes per day.  She exercises with enough effort to increase her heart rate 5 days per week. She is missing 2 dose(s) of medications per week.     She comes to the clinic today for ER follow-up.  Last Tuesday she started Mounjaro that is being  "prescribed an outside facility.  On Wednesday she developed a mild headache and decreased appetite.  She started having increased gas production and foul taste in her mouth.  About 2:00 on Wednesday afternoon she started having some stomach pain, took a warm bath and took a nap.  At 5:00 she felt hot and sweaty, burning in her mid abdomen region.  Pain persisted and was worsening so she went to the emergency room.  Between the emergency room and at home she had vomited approximately 5 times.  She did have similar pain at [facility], this was before she started Mounjaro.  She is unsure if pain is related to the medication.  She did get some fluids and pain medications in the ER and was feeling better.  While she was in the emergency room lab work did show leukocytosis, CT scan was benign.  Urinalysis did show blood and mucus, urine culture is pending.  No signs or causes of elevated white count identified.  She comes in today with no pain.        Objective    /72   Pulse 92   Temp 97.7  F (36.5  C)   Resp 16   Ht 1.651 m (5' 5\")   Wt 84.5 kg (186 lb 3.2 oz)   LMP 03/28/2024 (Approximate)   SpO2 98%   BMI 30.99 kg/m    Body mass index is 30.99 kg/m .  Physical Exam   GENERAL: alert and no distress  EYES: Eyes grossly normal to inspection  NEURO: Normal strength and tone, mentation intact and speech normal  PSYCH: mentation appears normal, affect normal/bright            Signed Electronically by: MIGDALIA Tan CNP    "

## 2024-04-18 NOTE — DISCHARGE INSTRUCTIONS
Get plenty of fluids and rest.  As discussed, your white blood cell count was elevated today at 16,000, normal was around 11.  The remainder of your studies appeared stable.  CT scan showed no acute findings in the abdominal or pelvis areas.  We discussed however obtaining further studies such as ultrasounds in the pelvis as well as further monitoring.  Did appear that you are feeling better and was decided to continue as an outpatient.  I did place referral for you to follow-up with PCP for close reassessment, we will send you home with some antinausea medication.  You should return if you are having worsening or concerning symptoms including intractable pain, increased nausea or vomiting, fevers etc.

## 2024-04-20 LAB — BACTERIA UR CULT: NORMAL

## 2024-07-06 ENCOUNTER — OFFICE VISIT (OUTPATIENT)
Dept: FAMILY MEDICINE | Facility: OTHER | Age: 29
End: 2024-07-06
Attending: STUDENT IN AN ORGANIZED HEALTH CARE EDUCATION/TRAINING PROGRAM
Payer: COMMERCIAL

## 2024-07-06 VITALS
OXYGEN SATURATION: 99 % | BODY MASS INDEX: 29.49 KG/M2 | SYSTOLIC BLOOD PRESSURE: 102 MMHG | RESPIRATION RATE: 20 BRPM | WEIGHT: 177 LBS | HEIGHT: 65 IN | HEART RATE: 83 BPM | TEMPERATURE: 99.2 F | DIASTOLIC BLOOD PRESSURE: 68 MMHG

## 2024-07-06 DIAGNOSIS — T78.40XA ALLERGIC REACTION, INITIAL ENCOUNTER: Primary | ICD-10-CM

## 2024-07-06 PROCEDURE — 96372 THER/PROPH/DIAG INJ SC/IM: CPT | Performed by: STUDENT IN AN ORGANIZED HEALTH CARE EDUCATION/TRAINING PROGRAM

## 2024-07-06 PROCEDURE — 250N000011 HC RX IP 250 OP 636: Mod: JZ | Performed by: STUDENT IN AN ORGANIZED HEALTH CARE EDUCATION/TRAINING PROGRAM

## 2024-07-06 PROCEDURE — 99213 OFFICE O/P EST LOW 20 MIN: CPT | Performed by: STUDENT IN AN ORGANIZED HEALTH CARE EDUCATION/TRAINING PROGRAM

## 2024-07-06 RX ORDER — TRIAMCINOLONE ACETONIDE 40 MG/ML
40 INJECTION, SUSPENSION INTRA-ARTICULAR; INTRAMUSCULAR ONCE
Status: COMPLETED | OUTPATIENT
Start: 2024-07-06 | End: 2024-07-06

## 2024-07-06 RX ORDER — CEFADROXIL 500 MG/1
500 CAPSULE ORAL 2 TIMES DAILY
Qty: 14 CAPSULE | Refills: 0 | Status: SHIPPED | OUTPATIENT
Start: 2024-07-06 | End: 2024-07-13

## 2024-07-06 RX ADMIN — TRIAMCINOLONE ACETONIDE 40 MG: 40 INJECTION, SUSPENSION INTRA-ARTICULAR; INTRAMUSCULAR at 12:27

## 2024-07-06 ASSESSMENT — PAIN SCALES - GENERAL: PAINLEVEL: MILD PAIN (2)

## 2024-07-06 NOTE — PROGRESS NOTES
"  Assessment & Plan     (T78.40XA) Allergic reaction, initial encounter  (primary encounter diagnosis)    Comment: Localized allergic reaction to bug bite.  Moderate swelling, pain.  Some redness as well noted.  No evidence of systemic reaction.  Vital signs are stable.  She denies possibility of pregnancy today.  It does not appear infected at this time.    Plan: triamcinolone (KENALOG-40) injection 40 mg,         cefadroxil (DURICEF) 500 MG capsule      Plan to treat with triamcinolone 40 mg injection.  She has received this in the past with good relief.  Last dose was last year.  Discussed using Zyrtec daily.  Continuing Benadryl as needed.  Duricef in 2 to 3 days if not improving.  Follow-up with PCP for any persisting symptoms.  Return to rapid clinic or ER for worsening or changing symptoms.        Subjective   Nicky is a 29 year old, presenting for the following health issues:  Insect Bites (On July 4)    HPI     Patient presents today with concerns of worsening insect bite on her left foot.  States it happened 2 days ago, yesterday today it has gotten significantly larger and more swollen.  More red and painful.  She has taken a dose of Benadryl yesterday with minimal relief of symptoms.  She has continued to use ice packs on the area.  She notes no difficulty with breathing, wheezing, or oral swelling.  This has happened to her in the past and she has had good relief from steroid.      Review of Systems  Constitutional, HEENT, cardiovascular, pulmonary, gi and gu systems are negative, except as otherwise noted.        Objective    /68 (BP Location: Right arm, Patient Position: Sitting, Cuff Size: Adult Regular)   Pulse 83   Temp 99.2  F (37.3  C) (Tympanic)   Resp 20   Ht 1.651 m (5' 5\")   Wt 80.3 kg (177 lb)   SpO2 99%   BMI 29.45 kg/m    Body mass index is 29.45 kg/m .    Physical Exam   GENERAL: alert and no distress  EYES: Eyes grossly normal to inspection, PERRL and conjunctivae and " sclerae normal  HENT: mouth without ulcers or lesions  NECK: no adenopathy, no asymmetry, masses, or scars  RESP: lungs clear to auscultation - no rales, rhonchi or wheezes  CV: regular rate and rhythm, normal S1 S2  MS: Left foot with moderate edema over the top of the foot extending laterally as well as towards the medial ankle, erythema, tender to palpation, slightly warm to the touch, full range of motion of the ankle and toes, distal pulses intact, light touch sensation is intact        Signed Electronically by: Nany Brooks PA-C

## 2024-07-06 NOTE — PATIENT INSTRUCTIONS
Allergic Reaction    Kenalog given today.    Antibiotics Monday if not improving.    Use zyrtec daily when you know you will be out in the woods/cabin/camping.  Benadryl every 6-8 if you are bitten.    Follow up if not improving.

## 2024-08-07 NOTE — TELEPHONE ENCOUNTER
FAMOTIDINE 20 MG TABLET   Last Written Prescription Date:    Last Fill Quantity: ,   # refills:   Last Office Visit: 9/16/2019  Future Office visit:    Next 5 appointments (look out 90 days)    Oct 15, 2019 11:15 AM CDT  Post Partum with Jeremías Hazel MD  [facility] ([facility]) [address]  [facility] [address]  [phone]           Routing refill request to provider for review/approval because:  Medication is reported/historical  Unable to complete prescription refill per RNMedication Refill Policy.................... Sue Medel RN ....................  9/16/2019   1:16 PM             Vital Signs Last 24 Hrs  T(C): 36.7 (05 Aug 2024 09:04), Max: 36.9 (04 Aug 2024 14:37)  T(F): 98 (05 Aug 2024 09:04), Max: 98.4 (04 Aug 2024 14:37)  HR: 64 (05 Aug 2024 09:04) (52 - 68)  BP: 106/65 (05 Aug 2024 09:04) (91/51 - 110/62)  BP(mean): --  RR: 18 (05 Aug 2024 09:04) (17 - 18)  SpO2: 98% (05 Aug 2024 09:04) (97% - 100%)     Vital Signs Last 24 Hrs  T(C): 36.9 (06 Aug 2024 06:10), Max: 36.9 (05 Aug 2024 14:37)  T(F): 98.4 (06 Aug 2024 06:10), Max: 98.4 (05 Aug 2024 14:37)  HR: 54 (06 Aug 2024 01:43) (54 - 83)  BP: 103/63 (06 Aug 2024 01:43) (94/58 - 108/66)  BP(mean): --  RR: 18 (06 Aug 2024 06:10) (16 - 18)  SpO2: 98% (06 Aug 2024 06:10) (97% - 98%)     Vital Signs Last 24 Hrs  T(C): 36.5 (31 Jul 2024 09:25), Max: 37.1 (30 Jul 2024 18:05)  T(F): 97.7 (31 Jul 2024 09:25), Max: 98.7 (30 Jul 2024 18:05)  HR: 68 (31 Jul 2024 09:25) (52 - 69)  BP: 88/51 (31 Jul 2024 09:30) (76/48 - 107/66)  BP(mean): --  RR: 18 (31 Jul 2024 09:25) (16 - 19)  SpO2: 100% (31 Jul 2024 09:25) (96% - 100%)    Parameters below as of 31 Jul 2024 06:05  Patient On (Oxygen Delivery Method): room air     Vital Signs Last 24 Hrs  T(C): 36.8 (02 Aug 2024 09:20), Max: 37.1 (02 Aug 2024 06:08)  T(F): 98.2 (02 Aug 2024 09:20), Max: 98.7 (02 Aug 2024 06:08)  HR: 76 (02 Aug 2024 09:20) (53 - 76)  BP: 103/70 (02 Aug 2024 09:20) (91/52 - 104/60)  BP(mean): --  RR: 18 (02 Aug 2024 09:20) (18 - 20)  SpO2: 99% (02 Aug 2024 09:20) (97% - 100%)    Parameters below as of 02 Aug 2024 03:55  Patient On (Oxygen Delivery Method): room air     Vital Signs Last 24 Hrs  T(C): 36.8 (07 Aug 2024 10:47), Max: 36.8 (06 Aug 2024 18:44)  T(F): 98.2 (07 Aug 2024 10:47), Max: 98.2 (06 Aug 2024 18:44)  HR: 66 (07 Aug 2024 10:47) (58 - 85)  BP: 106/73 (07 Aug 2024 10:47) (81/49 - 106/73)  BP(mean): --  RR: 18 (07 Aug 2024 10:47) (18 - 18)  SpO2: 99% (07 Aug 2024 10:47) (95% - 100%)     Vital Signs Last 24 Hrs  T(C): 36.8 (01 Aug 2024 09:50), Max: 37 (31 Jul 2024 18:36)  T(F): 98.2 (01 Aug 2024 09:50), Max: 98.6 (31 Jul 2024 18:36)  HR: 74 (01 Aug 2024 09:50) (51 - 75)  BP: 101/54 (01 Aug 2024 09:50) (87/53 - 101/54)  BP(mean): 64 (31 Jul 2024 22:32) (64 - 64)  RR: 18 (01 Aug 2024 09:50) (16 - 19)  SpO2: 98% (01 Aug 2024 09:50) (98% - 99%)    Parameters below as of 01 Aug 2024 05:58  Patient On (Oxygen Delivery Method): room air     Vital Signs Last 24 Hrs  T(C): 36.3 (30 Jul 2024 10:15), Max: 37.1 (29 Jul 2024 18:21)  T(F): 97.3 (30 Jul 2024 10:15), Max: 98.7 (29 Jul 2024 18:21)  HR: 79 (30 Jul 2024 10:15) (70 - 85)  BP: 100/63 (30 Jul 2024 10:15) (96/60 - 108/63)  BP(mean): --  RR: 17 (30 Jul 2024 10:15) (17 - 19)  SpO2: 100% (30 Jul 2024 10:15) (98% - 100%)    Parameters below as of 30 Jul 2024 02:53  Patient On (Oxygen Delivery Method): room air

## 2024-08-08 ENCOUNTER — OFFICE VISIT (OUTPATIENT)
Dept: FAMILY MEDICINE | Facility: OTHER | Age: 29
End: 2024-08-08
Attending: NURSE PRACTITIONER
Payer: COMMERCIAL

## 2024-08-08 VITALS
RESPIRATION RATE: 16 BRPM | OXYGEN SATURATION: 98 % | DIASTOLIC BLOOD PRESSURE: 70 MMHG | BODY MASS INDEX: 29.06 KG/M2 | SYSTOLIC BLOOD PRESSURE: 116 MMHG | WEIGHT: 174.4 LBS | HEART RATE: 84 BPM | HEIGHT: 65 IN

## 2024-08-08 DIAGNOSIS — F90.9 ATTENTION DEFICIT HYPERACTIVITY DISORDER (ADHD), UNSPECIFIED ADHD TYPE: Primary | ICD-10-CM

## 2024-08-08 LAB — CREAT UR-MCNC: 188 MG/DL

## 2024-08-08 PROCEDURE — 80346 BENZODIAZEPINES1-12: CPT | Mod: ZL | Performed by: NURSE PRACTITIONER

## 2024-08-08 PROCEDURE — 99213 OFFICE O/P EST LOW 20 MIN: CPT | Performed by: NURSE PRACTITIONER

## 2024-08-08 PROCEDURE — 80365 DRUG SCREENING OXYCODONE: CPT | Mod: ZL | Performed by: NURSE PRACTITIONER

## 2024-08-08 PROCEDURE — G2211 COMPLEX E/M VISIT ADD ON: HCPCS | Performed by: NURSE PRACTITIONER

## 2024-08-08 RX ORDER — DEXTROAMPHETAMINE SACCHARATE, AMPHETAMINE ASPARTATE, DEXTROAMPHETAMINE SULFATE AND AMPHETAMINE SULFATE 5; 5; 5; 5 MG/1; MG/1; MG/1; MG/1
20 TABLET ORAL DAILY
Qty: 30 TABLET | Refills: 0 | Status: SHIPPED | OUTPATIENT
Start: 2024-09-07 | End: 2024-10-07

## 2024-08-08 RX ORDER — DEXTROAMPHETAMINE SACCHARATE, AMPHETAMINE ASPARTATE, DEXTROAMPHETAMINE SULFATE AND AMPHETAMINE SULFATE 5; 5; 5; 5 MG/1; MG/1; MG/1; MG/1
20 TABLET ORAL DAILY
Qty: 30 TABLET | Refills: 0 | Status: SHIPPED | OUTPATIENT
Start: 2024-10-07 | End: 2024-11-06

## 2024-08-08 RX ORDER — DEXTROAMPHETAMINE SACCHARATE, AMPHETAMINE ASPARTATE, DEXTROAMPHETAMINE SULFATE AND AMPHETAMINE SULFATE 5; 5; 5; 5 MG/1; MG/1; MG/1; MG/1
20 TABLET ORAL DAILY
Qty: 30 TABLET | Refills: 0 | Status: SHIPPED | OUTPATIENT
Start: 2024-08-08 | End: 2024-09-07

## 2024-08-08 ASSESSMENT — ANXIETY QUESTIONNAIRES
7. FEELING AFRAID AS IF SOMETHING AWFUL MIGHT HAPPEN: MORE THAN HALF THE DAYS
7. FEELING AFRAID AS IF SOMETHING AWFUL MIGHT HAPPEN: MORE THAN HALF THE DAYS
6. BECOMING EASILY ANNOYED OR IRRITABLE: MORE THAN HALF THE DAYS
GAD7 TOTAL SCORE: 9
IF YOU CHECKED OFF ANY PROBLEMS ON THIS QUESTIONNAIRE, HOW DIFFICULT HAVE THESE PROBLEMS MADE IT FOR YOU TO DO YOUR WORK, TAKE CARE OF THINGS AT HOME, OR GET ALONG WITH OTHER PEOPLE: SOMEWHAT DIFFICULT
8. IF YOU CHECKED OFF ANY PROBLEMS, HOW DIFFICULT HAVE THESE MADE IT FOR YOU TO DO YOUR WORK, TAKE CARE OF THINGS AT HOME, OR GET ALONG WITH OTHER PEOPLE?: SOMEWHAT DIFFICULT
3. WORRYING TOO MUCH ABOUT DIFFERENT THINGS: SEVERAL DAYS
GAD7 TOTAL SCORE: 9
2. NOT BEING ABLE TO STOP OR CONTROL WORRYING: SEVERAL DAYS
4. TROUBLE RELAXING: SEVERAL DAYS
1. FEELING NERVOUS, ANXIOUS, OR ON EDGE: SEVERAL DAYS
GAD7 TOTAL SCORE: 9
5. BEING SO RESTLESS THAT IT IS HARD TO SIT STILL: SEVERAL DAYS

## 2024-08-08 ASSESSMENT — PATIENT HEALTH QUESTIONNAIRE - PHQ9
SUM OF ALL RESPONSES TO PHQ QUESTIONS 1-9: 4
10. IF YOU CHECKED OFF ANY PROBLEMS, HOW DIFFICULT HAVE THESE PROBLEMS MADE IT FOR YOU TO DO YOUR WORK, TAKE CARE OF THINGS AT HOME, OR GET ALONG WITH OTHER PEOPLE: SOMEWHAT DIFFICULT
SUM OF ALL RESPONSES TO PHQ QUESTIONS 1-9: 4

## 2024-08-08 NOTE — LETTER
[facility]  08/08/24  Patient: Nicky Hart  YOB: 1995  Medical Record Number: [id number]                                                                                  Non-Opioid Controlled Substance Agreement    This is an agreement between you and your provider regarding safe and appropriate use of controlled substances prescribed by your care team. Controlled substances are?medicines that can cause physical and mental dependence (abuse).     There are strict laws about having and using these medicines. We here at [facility] are  committed to working with you in your efforts to get better. To support you in this work, we'll help you schedule regular office appointments for medicine refills. If we must cancel or change your appointment for any reason, we'll make sure you have enough medicine to last until your next appointment.     As a Provider, I will:   Listen carefully to your concerns while treating you with respect.   Recommend a treatment plan that I believe is in your best interest and may involve therapies other than medicine.    Talk with you often about the possible benefits and the risk of harm of any medicine that we prescribe for you.  Assess the safety of this medicine and check how well it works.    Provide a plan on how to taper (discontinue or go off) using this medicine if the decision is made to stop its use.      ::  As a Patient, I understand controlled substances:     Are prescribed by my care provider to help me function or work and manage my condition(s).?  Are strong medicines and can cause serious side effects.     Need to be taken exactly as prescribed.?Combining controlled substances with certain medicines or chemicals (such as illegal drugs, alcohol, sedatives, sleeping pills, and benzodiazepines) can be dangerous or even fatal.? If I stop taking my medicines suddenly, I may have severe withdrawal symptoms.     The risks, benefits, and  side effects of these medicine(s) were explained to me. I agree that:    I will take part in other treatments as advised by my care team. This may be psychiatry or counseling, physical therapy, behavioral therapy, group treatment or a referral to specialist.    I will keep all my appointments and understand this is part of the monitoring of controlled substances.?My care team may require an office visit for EVERY controlled substance refill. If I miss appointments or don t follow instructions, my care team may stop my medicine    I will take my medicines as prescribed. I will not change the dose or schedule unless my care team tells me to. There will be no refills if I run out early.      I may be asked to come to the clinic and complete a urine drug test or complete a pill count. If I don t give a urine sample or participate in a pill count, the care team may stop my medicine.    I will only receive controlled substance prescriptions from this clinic. If I am treated by another provider, I will tell them that I am taking controlled substances and that I have a treatment agreement with this provider. I will inform my [facility] care team within one business day if I am given a prescription for any controlled substance by another healthcare provider. My [facility] care team can contact other providers and pharmacists about my use of any medicines.    It is up to me to make sure that I don't run out of my medicines on weekends or holidays.?If my care team is willing to refill my prescription without a visit, I must request refills only during office hours. Refills may take up to 3 business days to process. I will use one pharmacy to fill all my controlled substance prescriptions. I will notify the clinic about any changes to my insurance or medicine availability.    I am responsible for my prescriptions. If the medicine/prescription is lost, stolen or destroyed, it will not be replaced.?I also agree not  to share controlled substance medicines with anyone.     I am aware I should not use any illegal or recreational drugs. I agree not to drink alcohol unless my care team says I can.     If I enroll in the [place] Medical Cannabis program, I will tell my care team before my next refill.    I will tell my care team right away if I become pregnant, have a new medical problem treated outside of my regular clinic, or have a change in my medicines.     I understand that this medicine can affect my thinking, judgment and reaction time.? Alcohol and drugs affect the brain and body, which can affect the safety of my driving. Being under the influence of alcohol or drugs can affect my decision-making, behaviors, personal safety and the safety of others. Driving while impaired (DWI) can occur if a person is driving, operating or in physical control of a car, motorcycle, boat, snowmobile, ATV, motorbike, off-road vehicle or any other motor vehicle ([place] Statute 169A.20). I understand the risk if I choose to drive or operate any vehicle or machinery.    I understand that if I do not follow any of the conditions above, my prescriptions or treatment may be stopped or changed.   I agree that my provider, clinic care team and pharmacy may work with any city, state or federal law enforcement agency that investigates the misuse, sale or other diversion of my controlled medicine. I will allow my provider to discuss my care with, or share a copy of, this agreement with any other treating provider, pharmacy or emergency room where I receive care.     I have read this agreement and have asked questions about anything I did not understand.    ________________________________________________________  Patient Signature - Nicky Hart     ___________________                   Date     ________________________________________________________  Provider Signature - MIGDALIA Tan CNP       ___________________                   Date      ________________________________________________________  Witness Signature (required if provider not present while patient signing)          ___________________                   Date

## 2024-08-08 NOTE — NURSING NOTE
Patient presents today for medication management.    Medication Reconciliation Complete    Ai Barth LPN  8/8/2024 8:35 AM

## 2024-08-08 NOTE — PROGRESS NOTES
Assessment & Plan   Problem List Items Addressed This Visit    None  Visit Diagnoses       Attention deficit hyperactivity disorder (ADHD), unspecified ADHD type    -  Primary    Relevant Medications    amphetamine-dextroamphetamine (ADDERALL) 20 MG tablet    amphetamine-dextroamphetamine (ADDERALL) 20 MG tablet (Start on 9/7/2024)    amphetamine-dextroamphetamine (ADDERALL) 20 MG tablet (Start on 10/7/2024)    Other Relevant Orders    Drug Confirmation Panel Urine with Creat             Urine drug screen up-to-date, [facility] reviewed and as expected.  Refilled Adderall 20 mg immediate release, may take half to 1 tablet daily as needed.  Plan to follow-up in approximately 3 months, sooner with any concerns.      The longitudinal plan of care for the diagnosis(es)/condition(s) as documented were addressed during this visit. Due to the added complexity in care, I will continue to support Nicky in the subsequent management and with ongoing continuity of care.    No follow-ups on file.      Subjective   Nicky is a 29 year old, presenting for the following health issues:  Medication Therapy Management    History of Present Illness       Reason for visit:  Medication refills    She eats 2-3 servings of fruits and vegetables daily.She consumes 1 sweetened beverage(s) daily.She exercises with enough effort to increase her heart rate 20 to 29 minutes per day.  She exercises with enough effort to increase her heart rate 3 or less days per week. She is missing 3 dose(s) of medications per week.  She is not taking prescribed medications regularly due to remembering to take.     She presents to clinic today for follow-up on ADHD meds.  At her last visit, we switched her to Adderall instead of Vyvanse.  She has found that is working better, immediate release is helping with symptoms and still allowing her to sleep better at night.  She reports she is sometimes taking 20 mg and sometimes 10, and never goes in the same  "day.  She has been out of town on vacation, has not taken medications in the past 5 days.        Objective    /70   Pulse 84   Resp 16   Ht 1.651 m (5' 5\")   Wt 79.1 kg (174 lb 6.4 oz)   LMP 07/09/2024 (Approximate)   SpO2 98%   BMI 29.02 kg/m    Body mass index is 29.02 kg/m .  Physical Exam   GENERAL: alert and no distress  EYES: Eyes grossly normal to inspection  NEURO: Normal strength and tone, mentation intact and speech normal  PSYCH: mentation appears normal, affect normal/bright            Signed Electronically by: MIGDALIA Tan CNP    "

## 2024-09-03 ENCOUNTER — MYC MEDICAL ADVICE (OUTPATIENT)
Dept: FAMILY MEDICINE | Facility: OTHER | Age: 29
End: 2024-09-03
Payer: COMMERCIAL

## 2024-09-03 ENCOUNTER — E-VISIT (OUTPATIENT)
Dept: FAMILY MEDICINE | Facility: OTHER | Age: 29
End: 2024-09-03
Payer: COMMERCIAL

## 2024-09-03 DIAGNOSIS — J01.90 ACUTE SINUSITIS WITH SYMPTOMS > 10 DAYS: Primary | ICD-10-CM

## 2024-09-03 PROCEDURE — 99421 OL DIG E/M SVC 5-10 MIN: CPT | Performed by: NURSE PRACTITIONER

## 2024-09-03 RX ORDER — AZITHROMYCIN 250 MG/1
TABLET, FILM COATED ORAL
Qty: 6 TABLET | Refills: 0 | Status: SHIPPED | OUTPATIENT
Start: 2024-09-03 | End: 2024-09-08

## 2024-09-03 NOTE — TELEPHONE ENCOUNTER
Can you ask her to do an e visit so I can get more information? Luiza Murillo, MIGDALIA CNP on 9/3/2024 at 3:28 PM

## 2024-09-20 ENCOUNTER — TELEPHONE (OUTPATIENT)
Dept: FAMILY MEDICINE | Facility: OTHER | Age: 29
End: 2024-09-20
Payer: COMMERCIAL

## 2024-09-20 NOTE — TELEPHONE ENCOUNTER
[facility]- Received PA denial from [employer] for Ubrelvy 50MG tablets.  Faxed the denial / appeal information to HIM.  Sent a telephone message to the provider.     Denied - You must have tried and had a poor response to at least one Triptan drug( such as eletripta, rizatriptan, or sumatriptan). You may not need to try a triptan drug if you meet one of the followin) have a side effect or allergy to a triptan drug, 2) have a contraindication (a health condition or risk factor that may cause harm if you take a drug) to all triptan drugs, or 3) are currently receiving a positive response to the requested drug ad a change in therapy will be ineffective castro cause harm.     Appeals- expedited appeal use number on back of card or in writing to  [employer] [place]  Attention: Appeals and Grievances   [id number]  [place] [address]

## 2024-09-20 NOTE — TELEPHONE ENCOUNTER
Letter of appeal for Ubrelvy drafted and signed by PCP.     Mailed out to:        Minna Cardenas RN on 9/20/2024 at 4:22 PM

## 2024-09-20 NOTE — TELEPHONE ENCOUNTER
"Per clinic note 9/28/2023:    \"She has ongoing migraines, 2 over the past month, she is Tylenol, Flexeril, slept in a dark room and woke up with relief. She has been on Maxalt in the past which was not helpful, Imitrex was not helpful and Topamax caused weight loss and other unwanted side effects. We did try to order Nurtec in the past, she was not sure if this was covered or not because the pharmacy never told her if it was ready\"    Was also approved for Ubrelvy 10/2023, has been using medication with good results. Changing would likely cause in increase he migraines or lack of resolution. Has not tolerated other treatments.      Appeal letter to be written.     MIGDALIA Tan CNP on 9/20/2024 at 4:03 PM    "

## 2024-09-20 NOTE — LETTER
2024      Nicky Hart  [address] [place] [id number]  : 1995    To Whom It May Concern,    Nicky Hart is my patient who was recently denied coverage of Ubrelvy 50mg.  The reasoning for denial was listed as:    You must have tried and had a poor response to at least one Triptan drug( such as eletripta, rizatriptan, or sumatriptan). You may not need to try a triptan drug if you meet one of the followin) have a side effect or allergy to a triptan drug, 2) have a contraindication (a health condition or risk factor that may cause harm if you take a drug) to all triptan drugs, or 3) are currently receiving a positive response to the requested drug ad a change in therapy will be ineffective castro cause harm.     Nicky has ongoing migraines: most recently 2 over the last month.  She has tried Tylenol, Flexeril, as well as sleeping in a dark room. She has been on both Maxalt and Imitrex in the past which were not helpful and Topamax caused weight-loss and other unwanted side effects.    She was approved for Ubrelvy in 2023, and has been using this medication with good results. Changing would likely cause an increase in her migraines or lack of resolution.  She has not tolerated other treatments.      Please re-consider coverage for this medication for Nicky so that she can continue to receive relief from her migraines that, otherwise, are very limiting to her physical health and mental wellbeing.      Don't hesitate to reach out to my office with questions or updates at [phone].      Sincerely,        MIGDALIA Tan CNP

## 2024-12-03 ENCOUNTER — OFFICE VISIT (OUTPATIENT)
Dept: FAMILY MEDICINE | Facility: OTHER | Age: 29
End: 2024-12-03
Attending: NURSE PRACTITIONER
Payer: COMMERCIAL

## 2024-12-03 VITALS
RESPIRATION RATE: 16 BRPM | BODY MASS INDEX: 28.66 KG/M2 | HEART RATE: 78 BPM | OXYGEN SATURATION: 98 % | DIASTOLIC BLOOD PRESSURE: 64 MMHG | TEMPERATURE: 98.4 F | WEIGHT: 172 LBS | SYSTOLIC BLOOD PRESSURE: 108 MMHG | HEIGHT: 65 IN

## 2024-12-03 DIAGNOSIS — F90.9 ATTENTION DEFICIT HYPERACTIVITY DISORDER (ADHD), UNSPECIFIED ADHD TYPE: ICD-10-CM

## 2024-12-03 DIAGNOSIS — G43.909 MIGRAINE WITHOUT STATUS MIGRAINOSUS, NOT INTRACTABLE, UNSPECIFIED MIGRAINE TYPE: Primary | ICD-10-CM

## 2024-12-03 DIAGNOSIS — F41.9 ANXIETY: ICD-10-CM

## 2024-12-03 RX ORDER — PROPRANOLOL HYDROCHLORIDE 10 MG/1
10 TABLET ORAL 2 TIMES DAILY PRN
Qty: 60 TABLET | Refills: 0 | Status: SHIPPED | OUTPATIENT
Start: 2024-12-03

## 2024-12-03 RX ORDER — DEXTROAMPHETAMINE SACCHARATE, AMPHETAMINE ASPARTATE, DEXTROAMPHETAMINE SULFATE AND AMPHETAMINE SULFATE 5; 5; 5; 5 MG/1; MG/1; MG/1; MG/1
20 TABLET ORAL DAILY
Qty: 30 TABLET | Refills: 0 | Status: SHIPPED | OUTPATIENT
Start: 2024-12-03 | End: 2025-01-02

## 2024-12-03 RX ORDER — ALPRAZOLAM 0.5 MG
0.5 TABLET ORAL
Qty: 30 TABLET | Refills: 5 | Status: SHIPPED | OUTPATIENT
Start: 2024-12-03

## 2024-12-03 RX ORDER — DEXTROAMPHETAMINE SACCHARATE, AMPHETAMINE ASPARTATE, DEXTROAMPHETAMINE SULFATE AND AMPHETAMINE SULFATE 5; 5; 5; 5 MG/1; MG/1; MG/1; MG/1
20 TABLET ORAL DAILY
Qty: 30 TABLET | Refills: 0 | Status: SHIPPED | OUTPATIENT
Start: 2025-02-01 | End: 2025-03-03

## 2024-12-03 RX ORDER — PROPRANOLOL HYDROCHLORIDE 40 MG/1
40 TABLET ORAL DAILY
Qty: 90 TABLET | Refills: 4 | Status: SHIPPED | OUTPATIENT
Start: 2024-12-03

## 2024-12-03 RX ORDER — DEXTROAMPHETAMINE SACCHARATE, AMPHETAMINE ASPARTATE, DEXTROAMPHETAMINE SULFATE AND AMPHETAMINE SULFATE 5; 5; 5; 5 MG/1; MG/1; MG/1; MG/1
20 TABLET ORAL DAILY
Qty: 30 TABLET | Refills: 0 | Status: SHIPPED | OUTPATIENT
Start: 2025-01-02 | End: 2025-02-01

## 2024-12-03 ASSESSMENT — ANXIETY QUESTIONNAIRES
GAD7 TOTAL SCORE: 7
2. NOT BEING ABLE TO STOP OR CONTROL WORRYING: SEVERAL DAYS
GAD7 TOTAL SCORE: 7
7. FEELING AFRAID AS IF SOMETHING AWFUL MIGHT HAPPEN: NOT AT ALL
7. FEELING AFRAID AS IF SOMETHING AWFUL MIGHT HAPPEN: NOT AT ALL
IF YOU CHECKED OFF ANY PROBLEMS ON THIS QUESTIONNAIRE, HOW DIFFICULT HAVE THESE PROBLEMS MADE IT FOR YOU TO DO YOUR WORK, TAKE CARE OF THINGS AT HOME, OR GET ALONG WITH OTHER PEOPLE: SOMEWHAT DIFFICULT
5. BEING SO RESTLESS THAT IT IS HARD TO SIT STILL: NOT AT ALL
6. BECOMING EASILY ANNOYED OR IRRITABLE: MORE THAN HALF THE DAYS
1. FEELING NERVOUS, ANXIOUS, OR ON EDGE: MORE THAN HALF THE DAYS
3. WORRYING TOO MUCH ABOUT DIFFERENT THINGS: SEVERAL DAYS
8. IF YOU CHECKED OFF ANY PROBLEMS, HOW DIFFICULT HAVE THESE MADE IT FOR YOU TO DO YOUR WORK, TAKE CARE OF THINGS AT HOME, OR GET ALONG WITH OTHER PEOPLE?: SOMEWHAT DIFFICULT
GAD7 TOTAL SCORE: 7
4. TROUBLE RELAXING: SEVERAL DAYS

## 2024-12-03 ASSESSMENT — ASTHMA QUESTIONNAIRES
QUESTION_3 LAST FOUR WEEKS HOW OFTEN DID YOUR ASTHMA SYMPTOMS (WHEEZING, COUGHING, SHORTNESS OF BREATH, CHEST TIGHTNESS OR PAIN) WAKE YOU UP AT NIGHT OR EARLIER THAN USUAL IN THE MORNING: NOT AT ALL
QUESTION_4 LAST FOUR WEEKS HOW OFTEN HAVE YOU USED YOUR RESCUE INHALER OR NEBULIZER MEDICATION (SUCH AS ALBUTEROL): NOT AT ALL
ACT_TOTALSCORE: 25
ACT_TOTALSCORE: 25
QUESTION_5 LAST FOUR WEEKS HOW WOULD YOU RATE YOUR ASTHMA CONTROL: COMPLETELY CONTROLLED
QUESTION_2 LAST FOUR WEEKS HOW OFTEN HAVE YOU HAD SHORTNESS OF BREATH: NOT AT ALL
QUESTION_1 LAST FOUR WEEKS HOW MUCH OF THE TIME DID YOUR ASTHMA KEEP YOU FROM GETTING AS MUCH DONE AT WORK, SCHOOL OR AT HOME: NONE OF THE TIME

## 2024-12-03 ASSESSMENT — PAIN SCALES - GENERAL: PAINLEVEL_OUTOF10: MILD PAIN (2)

## 2024-12-03 ASSESSMENT — PATIENT HEALTH QUESTIONNAIRE - PHQ9
10. IF YOU CHECKED OFF ANY PROBLEMS, HOW DIFFICULT HAVE THESE PROBLEMS MADE IT FOR YOU TO DO YOUR WORK, TAKE CARE OF THINGS AT HOME, OR GET ALONG WITH OTHER PEOPLE: SOMEWHAT DIFFICULT
SUM OF ALL RESPONSES TO PHQ QUESTIONS 1-9: 7
SUM OF ALL RESPONSES TO PHQ QUESTIONS 1-9: 7

## 2024-12-03 NOTE — PROGRESS NOTES
Assessment & Plan   Problem List Items Addressed This Visit    None  Visit Diagnoses       Migraine without status migrainosus, not intractable, unspecified migraine type    -  Primary    Relevant Medications    propranolol (INDERAL) 40 MG tablet    propranolol (INDERAL) 10 MG tablet    Anxiety        Relevant Medications    propranolol (INDERAL) 10 MG tablet    ALPRAZolam (XANAX) 0.5 MG tablet    Attention deficit hyperactivity disorder (ADHD), unspecified ADHD type        Relevant Medications    amphetamine-dextroamphetamine (ADDERALL) 20 MG tablet    amphetamine-dextroamphetamine (ADDERALL) 20 MG tablet (Start on 1/2/2025)    amphetamine-dextroamphetamine (ADDERALL) 20 MG tablet (Start on 2/1/2025)           Will start propranolol 40 mg daily for migraine prevention.  Blood pressure is low, pulse is stable, will monitor this.  Also provided propranolol 10 mg twice daily as needed for anxiety.  [facility] reviewed and as expected.  Refilled Xanax to have on hand for nighttime.  She would like to continue with Adderall immediate release 20 mg, takes 0.5 to 1 tablet daily as needed will send that she is working.  Follow-up in 3 months or sooner if needed.    The longitudinal plan of care for the diagnosis(es)/condition(s) as documented were addressed during this visit. Due to the added complexity in care, I will continue to support Nicky in the subsequent management and with ongoing continuity of care.       No follow-ups on file.      Subjective   Nicky is a 29 year old, presenting for the following health issues:  Medication Therapy Management    History of Present Illness       Reason for visit:  Med management She is missing 3 dose(s) of medications per week.  She is not taking prescribed medications regularly due to remembering to take.     She presents to clinic today for follow-up on meds.  She has found increased headaches with using both Adderall and Vyvanse although Vyvanse makes symptoms worse.   "She is having headaches daily.  Ubrelvy was recently approved, she has not picked this up.  She is taking ibuprofen and Tylenol on a regular basis, update 100 mg of ibuprofen daily.  She is wondering about preventative medication for migraines.  She states she was on Topamax in the past, helped with headaches but caused significant weight loss and tingling in her fingers.  Imitrex was helpful for a while but stopped working.  Continues to struggle with taking medications on a daily basis due to forgetfulness.  Having increased anxiety, would like to have an as needed medication that will not cause fatigue or headaches.          Objective    /64   Pulse 78   Temp 98.4  F (36.9  C)   Resp 16   Ht 1.651 m (5' 5\")   Wt 78 kg (172 lb)   LMP 11/15/2024 (Exact Date)   SpO2 98%   BMI 28.62 kg/m    Body mass index is 28.62 kg/m .  Physical Exam   GENERAL: alert and no distress  EYES: Eyes grossly normal to inspection  NEURO: Normal strength and tone, mentation intact and speech normal  PSYCH: mentation appears normal, affect normal/bright            Signed Electronically by: MIGDALIA Tan CNP    "

## 2025-03-03 ENCOUNTER — PATIENT OUTREACH (OUTPATIENT)
Dept: CARE COORDINATION | Facility: CLINIC | Age: 30
End: 2025-03-03
Payer: COMMERCIAL

## 2025-05-02 ENCOUNTER — OFFICE VISIT (OUTPATIENT)
Dept: FAMILY MEDICINE | Facility: OTHER | Age: 30
End: 2025-05-02
Attending: NURSE PRACTITIONER
Payer: COMMERCIAL

## 2025-05-02 VITALS
DIASTOLIC BLOOD PRESSURE: 70 MMHG | WEIGHT: 174 LBS | TEMPERATURE: 98.1 F | HEIGHT: 65 IN | OXYGEN SATURATION: 98 % | BODY MASS INDEX: 28.99 KG/M2 | RESPIRATION RATE: 16 BRPM | SYSTOLIC BLOOD PRESSURE: 108 MMHG | HEART RATE: 76 BPM

## 2025-05-02 DIAGNOSIS — F60.3 BORDERLINE PERSONALITY DISORDER (H): ICD-10-CM

## 2025-05-02 DIAGNOSIS — Z91.030 BEE STING ALLERGY: ICD-10-CM

## 2025-05-02 DIAGNOSIS — G43.909 MIGRAINE WITHOUT STATUS MIGRAINOSUS, NOT INTRACTABLE, UNSPECIFIED MIGRAINE TYPE: ICD-10-CM

## 2025-05-02 DIAGNOSIS — Z00.00 ROUTINE GENERAL MEDICAL EXAMINATION AT A HEALTH CARE FACILITY: Primary | ICD-10-CM

## 2025-05-02 DIAGNOSIS — J45.41 MODERATE PERSISTENT REACTIVE AIRWAY DISEASE WITH ACUTE EXACERBATION: ICD-10-CM

## 2025-05-02 RX ORDER — LAMOTRIGINE 100 MG/1
TABLET ORAL
COMMUNITY
Start: 2025-05-01

## 2025-05-02 RX ORDER — PROPRANOLOL HYDROCHLORIDE 40 MG/1
40 TABLET ORAL DAILY
Qty: 90 TABLET | Refills: 4 | Status: SHIPPED | OUTPATIENT
Start: 2025-05-02

## 2025-05-02 RX ORDER — LISDEXAMFETAMINE DIMESYLATE 30 MG/1
30 CAPSULE ORAL EVERY MORNING
COMMUNITY

## 2025-05-02 RX ORDER — ALBUTEROL SULFATE 90 UG/1
2 INHALANT RESPIRATORY (INHALATION) EVERY 6 HOURS PRN
Qty: 18 G | Refills: 3 | Status: SHIPPED | OUTPATIENT
Start: 2025-05-02

## 2025-05-02 RX ORDER — EPINEPHRINE 0.3 MG/.3ML
0.3 INJECTION SUBCUTANEOUS
Qty: 2 EACH | Refills: 11 | Status: SHIPPED | OUTPATIENT
Start: 2025-05-02

## 2025-05-02 SDOH — HEALTH STABILITY: PHYSICAL HEALTH: ON AVERAGE, HOW MANY DAYS PER WEEK DO YOU ENGAGE IN MODERATE TO STRENUOUS EXERCISE (LIKE A BRISK WALK)?: 3 DAYS

## 2025-05-02 SDOH — HEALTH STABILITY: PHYSICAL HEALTH: ON AVERAGE, HOW MANY MINUTES DO YOU ENGAGE IN EXERCISE AT THIS LEVEL?: 30 MIN

## 2025-05-02 ASSESSMENT — SOCIAL DETERMINANTS OF HEALTH (SDOH): HOW OFTEN DO YOU GET TOGETHER WITH FRIENDS OR RELATIVES?: ONCE A WEEK

## 2025-05-02 ASSESSMENT — ANXIETY QUESTIONNAIRES
6. BECOMING EASILY ANNOYED OR IRRITABLE: MORE THAN HALF THE DAYS
IF YOU CHECKED OFF ANY PROBLEMS ON THIS QUESTIONNAIRE, HOW DIFFICULT HAVE THESE PROBLEMS MADE IT FOR YOU TO DO YOUR WORK, TAKE CARE OF THINGS AT HOME, OR GET ALONG WITH OTHER PEOPLE: SOMEWHAT DIFFICULT
GAD7 TOTAL SCORE: 7
4. TROUBLE RELAXING: SEVERAL DAYS
1. FEELING NERVOUS, ANXIOUS, OR ON EDGE: SEVERAL DAYS
GAD7 TOTAL SCORE: 7
2. NOT BEING ABLE TO STOP OR CONTROL WORRYING: SEVERAL DAYS
7. FEELING AFRAID AS IF SOMETHING AWFUL MIGHT HAPPEN: SEVERAL DAYS
8. IF YOU CHECKED OFF ANY PROBLEMS, HOW DIFFICULT HAVE THESE MADE IT FOR YOU TO DO YOUR WORK, TAKE CARE OF THINGS AT HOME, OR GET ALONG WITH OTHER PEOPLE?: SOMEWHAT DIFFICULT
3. WORRYING TOO MUCH ABOUT DIFFERENT THINGS: SEVERAL DAYS
GAD7 TOTAL SCORE: 7
5. BEING SO RESTLESS THAT IT IS HARD TO SIT STILL: NOT AT ALL
7. FEELING AFRAID AS IF SOMETHING AWFUL MIGHT HAPPEN: SEVERAL DAYS

## 2025-05-02 ASSESSMENT — PAIN SCALES - GENERAL: PAINLEVEL_OUTOF10: NO PAIN (0)

## 2025-05-02 NOTE — LETTER
My Asthma Action Plan    Name: Nicky Hart   YOB: 1995  Date: 5/2/2025   My doctor: MIGDALIA Tan CNP   My clinic: [facility]        My Rescue Medicine: Albuterol (Proair/Ventolin/Proventil HFA) 2-4 puffs EVERY 4 HOURS as needed. Use a spacer if recommended by your provider.   My Asthma Severity:   Intermittent / Exercise Induced  Know your asthma triggers: upper respiratory infections             GREEN ZONE   Good Control  I feel good  No cough or wheeze  Can work, sleep and play without asthma symptoms       Take your asthma control medicine every day.     If exercise triggers your asthma, take your rescue medication  15 minutes before exercise or sports, and  During exercise if you have asthma symptoms  Spacer to use with inhaler: If you have a spacer, make sure to use it with your inhaler             YELLOW ZONE Getting Worse  I have ANY of these:  I do not feel good  Cough or wheeze  Chest feels tight  Wake up at night   Keep taking your Green Zone medications  Start taking your rescue medicine:  every 20 minutes for up to 1 hour. Then every 4 hours for 24-48 hours.  If you stay in the Yellow Zone for more than 12-24 hours, contact your doctor.  If you do not return to the Green Zone in 12-24 hours or you get worse, start taking your oral steroid medicine if prescribed by your provider.           RED ZONE Medical Alert - Get Help  I have ANY of these:  I feel awful  Medicine is not helping  Breathing getting harder  Trouble walking or talking  Nose opens wide to breathe       Take your rescue medicine NOW  If your provider has prescribed an oral steroid medicine, start taking it NOW  Call your doctor NOW  If you are still in the Red Zone after 20 minutes and you have not reached your doctor:  Take your rescue medicine again and  Call 911 or go to the emergency room right away    See your regular doctor within 2 weeks of an Emergency Room or Urgent Care visit for  follow-up treatment.          Annual Reminders:  Meet with Asthma Educator,  Flu Shot in the Fall, consider Pneumonia Vaccination for patients with asthma (aged 19 and older).    Pharmacy:    [id number] - [place], [place] - [address]  [facility]    Electronically signed by MIGDALIA Tan CNP   Date: 05/02/25                    Asthma Triggers  How To Control Things That Make Your Asthma Worse    Triggers are things that make your asthma worse.  Look at the list below to help you find your triggers and   what you can do about them. You can help prevent asthma flare-ups by staying away from your triggers.      Trigger                                                          What you can do   Cigarette Smoke  Tobacco smoke can make asthma worse. Do not allow smoking in your home, car or around you.  Be sure no one smokes at a child s day care or school.  If you smoke, ask your health care provider for ways to help you quit.  Ask family members to quit too.  Ask your health care provider for a referral to Quit Plan to help you quit smoking, or call 3-503-329-PLAN.     Colds, Flu, Bronchitis  These are common triggers of asthma. Wash your hands often.  Don t touch your eyes, nose or mouth.  Get a flu shot every year.     Dust Mites  These are tiny bugs that live in cloth or carpet. They are too small to see. Wash sheets and blankets in hot water every week.   Encase pillows and mattress in dust mite proof covers.  Avoid having carpet if you can. If you have carpet, vacuum weekly.   Use a dust mask and HEPA vacuum.   Pollen and Outdoor Mold  Some people are allergic to trees, grass, or weed pollen, or molds. Try to keep your windows closed.  Limit time out doors when pollen count is high.   Ask you health care provider about taking medicine during allergy season.     Animal Dander  Some people are allergic to skin flakes, urine or saliva from pets with fur or  feathers. Keep pets with fur or feathers out of your home.    If you can t keep the pet outdoors, then keep the pet out of your bedroom.  Keep the bedroom door closed.  Keep pets off cloth furniture and away from stuffed toys.     Mice, Rats, and Cockroaches  Some people are allergic to the waste from these pests.   Cover food and garbage.  Clean up spills and food crumbs.  Store grease in the refrigerator.   Keep food out of the bedroom.   Indoor Mold  This can be a trigger if your home has high moisture. Fix leaking faucets, pipes, or other sources of water.   Clean moldy surfaces.  Dehumidify basement if it is damp and smelly.   Smoke, Strong Odors, and Sprays  These can reduce air quality. Stay away from strong odors and sprays, such as perfume, powder, hair spray, paints, smoke incense, paint, cleaning products, candles and new carpet.   Exercise or Sports  Some people with asthma have this trigger. Be active!  Ask your doctor about taking medicine before sports or exercise to prevent symptoms.    Warm up for 5-10 minutes before and after sports or exercise.     Other Triggers of Asthma  Cold air:  Cover your nose and mouth with a scarf.  Sometimes laughing or crying can be a trigger.  Some medicines and food can trigger asthma.

## 2025-05-02 NOTE — PROGRESS NOTES
"Preventive Care Visit  [facility] AND [facility]  MIGDALIA Tan CNP, Nurse Practitioner - Family  May 2, 2025      Assessment & Plan   Problem List Items Addressed This Visit          Behavioral    Borderline personality disorder (H)     Other Visit Diagnoses       Routine general medical examination at a health care facility    -  Primary    Migraine without status migrainosus, not intractable, unspecified migraine type        Relevant Medications    lamoTRIgine (LAMICTAL) 100 MG tablet    propranolol (INDERAL) 40 MG tablet    Bee sting allergy        Relevant Medications    EPINEPHrine (ANY BX GENERIC EQUIV) 0.3 MG/0.3ML injection 2-pack    albuterol (PROAIR HFA/PROVENTIL HFA/VENTOLIN HFA) 108 (90 Base) MCG/ACT inhaler    Moderate persistent reactive airway disease with acute exacerbation        Relevant Medications    albuterol (PROAIR HFA/PROVENTIL HFA/VENTOLIN HFA) 108 (90 Base) MCG/ACT inhaler           Personality disorder, manage moderate module 1 currently under good control  Migraines, refill propranolol, use as needed, migraines have been well-controlled  She does have bee sting allergy, EpiPen ordered as [place].  Asthma has been stable, refilled inhaler  She is up-to-date on other preventative health care at this time  Follow-up annually and as needed    The longitudinal plan of care for the diagnosis(es)/condition(s) as documented were addressed during this visit. Due to the added complexity in care, I will continue to support Nicky in the subsequent management and with ongoing continuity of care.  Patient has been advised of split billing requirements and indicates understanding: Yes       BMI  Estimated body mass index is 28.96 kg/m  as calculated from the following:    Height as of this encounter: 1.651 m (5' 5\").    Weight as of this encounter: 78.9 kg (174 lb).   Weight management plan: Discussed healthy diet and exercise " guidelines    Counseling  Appropriate preventive services were addressed with this patient via screening, questionnaire, or discussion as appropriate for fall prevention, nutrition, physical activity, Tobacco-use cessation, social engagement, weight loss and cognition.  Checklist reviewing preventive services available has been given to the patient.  Reviewed patient's diet, addressing concerns and/or questions.   She is at risk for lack of exercise and has been provided with information to increase physical activity for the benefit of her well-being.   She is at risk for psychosocial distress and has been provided with information to reduce risk.         Zandra Saunders is a 30 year old, presenting for the following:  Physical           History of Present Illness       Reason for visit:  Annual physical She is missing 3 dose(s) of medications per week.  She presents to clinic today for annual wellness exam.  Since I last saw her she started working with Mariah at [facility] for med management and is doing therapy weekly.  She has been working and taking her medications on a consistent basis.  Does have increased emotions around her periods.  She has been having severe IBS symptoms recently, cramping and diarrhea.  She has been trying to do food alterations, cut out gluten which has helped her symptoms significantly.  She is also having decreased headaches without taking gluten.        5/2/2025   General Health   How would you rate your overall physical health? Good   Feel stress (tense, anxious, or unable to sleep) To some extent   (!) STRESS CONCERN      5/2/2025   Nutrition   Three or more servings of calcium each day? Yes   Diet: Gluten-free/reduced   How many servings of fruit and vegetables per day? (!) 2-3   How many sweetened beverages each day? 0-1         5/2/2025   Exercise   Days per week of moderate/strenous exercise 3 days   Average minutes spent exercising at this level 30 min         5/2/2025    Social Factors   Frequency of gathering with friends or relatives Once a week   Worry food won't last until get money to buy more No   Food not last or not have enough money for food? No   Do you have housing? (Housing is defined as stable permanent housing and does not include staying outside in a car, in a tent, in an abandoned building, in an overnight shelter, or couch-surfing.) Yes   Are you worried about losing your housing? No   Lack of transportation? No   Unable to get utilities (heat,electricity)? No         5/2/2025   Dental   Dentist two times every year? Yes         Today's PHQ-2 Score:       5/2/2025    10:10 AM   PHQ-2 ( 1999 [employer])   Q1: Little interest or pleasure in doing things 0   Q2: Feeling down, depressed or hopeless 0   PHQ-2 Score 0    Q1: Little interest or pleasure in doing things Not at all   Q2: Feeling down, depressed or hopeless Not at all   PHQ-2 Score 0       Patient-reported           5/2/2025   Substance Use   Alcohol more than 3/day or more than 7/wk No   Do you use any other substances recreationally? No     Social History     Tobacco Use    Smoking status: Never    Smokeless tobacco: Never   Vaping Use    Vaping status: Never Used   Substance Use Topics    Alcohol use: Not Currently     Comment: rare - 3 times per year    Drug use: Not Currently                  5/2/2025   STI Screening   New sexual partner(s) since last STI/HIV test? No     History of abnormal Pap smear: No - age 30- 64 PAP with HPV every 5 years recommended        4/2/2024     9:19 AM 3/17/2021     9:14 AM   PAP / HPV   PAP Negative for Intraepithelial Lesion or Malignancy (NILM)     PAP (Historical)  NIL            5/2/2025   Contraception/Family Planning   Questions about contraception or family planning No        Reviewed and updated as needed this visit by Provider   Tobacco  Allergies  Meds  Problems  Med Hx  Surg Hx  Fam Hx               Objective    Exam  /70   Pulse 76   Temp 98.1  F  "(36.7  C)   Resp 16   Ht 1.651 m (5' 5\")   Wt 78.9 kg (174 lb)   LMP 04/24/2025 (Exact Date)   SpO2 98%   BMI 28.96 kg/m     Estimated body mass index is 28.96 kg/m  as calculated from the following:    Height as of this encounter: 1.651 m (5' 5\").    Weight as of this encounter: 78.9 kg (174 lb).    Physical Exam  GENERAL: alert and no distress  EYES: Eyes grossly normal to inspection, PERRL and conjunctivae and sclerae normal  HENT: ear canals and TM's normal, nose and mouth without ulcers or lesions  NECK: no adenopathy, no asymmetry, masses, or scars  RESP: lungs clear to auscultation - no rales, rhonchi or wheezes  CV: regular rate and rhythm, normal S1 S2, no S3 or S4, no murmur, click or rub, no peripheral edema  ABDOMEN: soft, nontender, no hepatosplenomegaly, no masses and bowel sounds normal  MS: no gross musculoskeletal defects noted, no edema  SKIN: no suspicious lesions or rashes  NEURO: Normal strength and tone, mentation intact and speech normal  PSYCH: mentation appears normal, affect normal/bright        Signed Electronically by: MIGDALIA Tan CNP    "

## 2025-05-02 NOTE — PATIENT INSTRUCTIONS
Patient Education   Preventive Care Advice   This is general advice given by our system to help you stay healthy. However, your care team may have specific advice just for you. Please talk to your care team about your preventive care needs.  Nutrition  Eat 5 or more servings of fruits and vegetables each day.  Try wheat bread, brown rice and whole grain pasta (instead of white bread, rice, and pasta).  Get enough calcium and vitamin D. Check the label on foods and aim for 100% of the RDA (recommended daily allowance).  Lifestyle  Exercise at least 150 minutes each week  (30 minutes a day, 5 days a week).  Do muscle strengthening activities 2 days a week. These help control your weight and prevent disease.  No smoking.  Wear sunscreen to prevent skin cancer.  Have a dental exam and cleaning every 6 months.  Yearly exams  See your health care team every year to talk about:  Any changes in your health.  Any medicines your care team has prescribed.  Preventive care, family planning, and ways to prevent chronic diseases.  Shots (vaccines)   HPV shots (up to age 26), if you've never had them before.  Hepatitis B shots (up to age 59), if you've never had them before.  COVID-19 shot: Get this shot when it's due.  Flu shot: Get a flu shot every year.  Tetanus shot: Get a tetanus shot every 10 years.  Pneumococcal, hepatitis A, and RSV shots: Ask your care team if you need these based on your risk.  Shingles shot (for age 50 and up)  General health tests  Diabetes screening:  Starting at age 35, Get screened for diabetes at least every 3 years.  If you are younger than age 35, ask your care team if you should be screened for diabetes.  Cholesterol test: At age 39, start having a cholesterol test every 5 years, or more often if advised.  Bone density scan (DEXA): At age 50, ask your care team if you should have this scan for osteoporosis (brittle bones).  Hepatitis C: Get tested at least once in your life.  STIs (sexually  transmitted infections)  Before age 24: Ask your care team if you should be screened for STIs.  After age 24: Get screened for STIs if you're at risk. You are at risk for STIs (including HIV) if:  You are sexually active with more than one person.  You don't use condoms every time.  You or a partner was diagnosed with a sexually transmitted infection.  If you are at risk for HIV, ask about PrEP medicine to prevent HIV.  Get tested for HIV at least once in your life, whether you are at risk for HIV or not.  Cancer screening tests  Cervical cancer screening: If you have a cervix, begin getting regular cervical cancer screening tests starting at age 21.  Breast cancer scan (mammogram): If you've ever had breasts, begin having regular mammograms starting at age 40. This is a scan to check for breast cancer.  Colon cancer screening: It is important to start screening for colon cancer at age 45.  Have a colonoscopy test every 10 years (or more often if you're at risk) Or, ask your provider about stool tests like a FIT test every year or Cologuard test every 3 years.  To learn more about your testing options, visit:   .  For help making a decision, visit:   https://bit.ly/az00920.  Prostate cancer screening test: If you have a prostate, ask your care team if a prostate cancer screening test (PSA) at age 55 is right for you.  Lung cancer screening: If you are a current or former smoker ages 50 to 80, ask your care team if ongoing lung cancer screenings are right for you.  For informational purposes only. Not to replace the advice of your health care provider. Copyright   2023 [facility]. All rights reserved. Clinically reviewed by the [facility]. [employer] 690890 - REV 01/24.  Learning About Stress  What is stress?     Stress is your body's response to a hard situation. Your body can have a physical, emotional, or mental response. Stress is a fact of life for most people, and it  affects everyone differently. What causes stress for you may not be stressful for someone else.  A lot of things can cause stress. You may feel stress when you go on a job interview, take a test, or run a race. This kind of short-term stress is normal and even useful. It can help you if you need to work hard or react quickly. For example, stress can help you finish an important job on time.  Long-term stress is caused by ongoing stressful situations or events. Examples of long-term stress include long-term health problems, ongoing problems at work, or conflicts in your family. Long-term stress can harm your health.  How does stress affect your health?  When you are stressed, your body responds as though you are in danger. It makes hormones that speed up your heart, make you breathe faster, and give you a burst of energy. This is called the fight-or-flight stress response. If the stress is over quickly, your body goes back to normal and no harm is done.  But if stress happens too often or lasts too long, it can have bad effects. Long-term stress can make you more likely to get sick, and it can make symptoms of some diseases worse. If you tense up when you are stressed, you may develop neck, shoulder, or low back pain. Stress is linked to high blood pressure and heart disease.  Stress also harms your emotional health. It can make you ibrahim, tense, or depressed. Your relationships may suffer, and you may not do well at work or school.  What can you do to manage stress?  You can try these things to help manage stress:   Do something active. Exercise or activity can help reduce stress. Walking is a great way to get started. Even everyday activities such as housecleaning or yard work can help.  Try yoga or jazmzine chi. These techniques combine exercise and meditation. You may need some training at first to learn them.  Do something you enjoy. For example, listen to music or go to a movie. Practice your hobby or do volunteer  "work.  Meditate. This can help you relax, because you are not worrying about what happened before or what may happen in the future.  Do guided imagery. Imagine yourself in any setting that helps you feel calm. You can use online videos, books, or a teacher to guide you.  Do breathing exercises. For example:  From a standing position, bend forward from the waist with your knees slightly bent. Let your arms dangle close to the floor.  Breathe in slowly and deeply as you return to a standing position. Roll up slowly and lift your head last.  Hold your breath for just a few seconds in the standing position.  Breathe out slowly and bend forward from the waist.  Let your feelings out. Talk, laugh, cry, and express anger when you need to. Talking with supportive friends or family, a counselor, or a berta leader about your feelings is a healthy way to relieve stress. Avoid discussing your feelings with people who make you feel worse.  Write. It may help to write about things that are bothering you. This helps you find out how much stress you feel and what is causing it. When you know this, you can find better ways to cope.  What can you do to prevent stress?  You might try some of these things to help prevent stress:  Manage your time. This helps you find time to do the things you want and need to do.  Get enough sleep. Your body recovers from the stresses of the day while you are sleeping.  Get support. Your family, friends, and community can make a difference in how you experience stress.  Limit your news feed. Avoid or limit time on social media or news that may make you feel stressed.  Do something active. Exercise or activity can help reduce stress. Walking is a great way to get started.  Where can you learn more?  Go to https://www.[employer].net/patiented  Enter N032 in the search box to learn more about \"Learning About Stress.\"  Current as of: October 24, 2024  Content Version: 14.4 2024-2025 [employer].   Care instructions adapted under license by your healthcare professional. If you have questions about a medical condition or this instruction, always ask your healthcare professional. [employer] disclaims any warranty or liability for your use of this information.

## 2025-05-02 NOTE — NURSING NOTE
Patient presents today for annual check up.    Medication Reconciliation Complete    Ai Barth LPN  5/2/2025 10:14 AM

## 2025-08-12 ENCOUNTER — MYC MEDICAL ADVICE (OUTPATIENT)
Dept: FAMILY MEDICINE | Facility: OTHER | Age: 30
End: 2025-08-12
Payer: COMMERCIAL

## 2025-08-12 DIAGNOSIS — Z30.09 BIRTH CONTROL COUNSELING: ICD-10-CM

## (undated) RX ORDER — FENTANYL CITRATE 50 UG/ML
INJECTION, SOLUTION INTRAMUSCULAR; INTRAVENOUS
Status: DISPENSED
Start: 2019-09-06

## (undated) RX ORDER — CEFAZOLIN SODIUM 1 G/50ML
INJECTION, SOLUTION INTRAVENOUS
Status: DISPENSED
Start: 2019-09-07

## (undated) RX ORDER — CEFAZOLIN SODIUM 1 G/50ML
INJECTION, SOLUTION INTRAVENOUS
Status: DISPENSED
Start: 2019-09-06

## (undated) RX ORDER — KETOROLAC TROMETHAMINE 15 MG/ML
INJECTION, SOLUTION INTRAMUSCULAR; INTRAVENOUS
Status: DISPENSED
Start: 2024-04-17

## (undated) RX ORDER — TRIAMCINOLONE ACETONIDE 40 MG/ML
INJECTION, SUSPENSION INTRA-ARTICULAR; INTRAMUSCULAR
Status: DISPENSED
Start: 2023-06-15

## (undated) RX ORDER — ONDANSETRON 2 MG/ML
INJECTION INTRAMUSCULAR; INTRAVENOUS
Status: DISPENSED
Start: 2024-04-17

## (undated) RX ORDER — FENTANYL CITRATE 50 UG/ML
INJECTION, SOLUTION INTRAMUSCULAR; INTRAVENOUS
Status: DISPENSED
Start: 2024-04-17

## (undated) RX ORDER — TRIAMCINOLONE ACETONIDE 40 MG/ML
INJECTION, SUSPENSION INTRA-ARTICULAR; INTRAMUSCULAR
Status: DISPENSED
Start: 2024-07-06